# Patient Record
Sex: FEMALE | Race: WHITE | NOT HISPANIC OR LATINO | Employment: PART TIME | URBAN - METROPOLITAN AREA
[De-identification: names, ages, dates, MRNs, and addresses within clinical notes are randomized per-mention and may not be internally consistent; named-entity substitution may affect disease eponyms.]

---

## 2017-01-24 ENCOUNTER — PATIENT MESSAGE (OUTPATIENT)
Dept: DERMATOLOGY | Facility: CLINIC | Age: 57
End: 2017-01-24

## 2017-01-30 DIAGNOSIS — L40.9 PSORIASIS: ICD-10-CM

## 2017-01-30 DIAGNOSIS — Z79.899 ENCOUNTER FOR LONG-TERM (CURRENT) USE OF OTHER MEDICATIONS: ICD-10-CM

## 2017-01-30 RX ORDER — CALCIPOTRIENE AND BETAMETHASONE DIPROPIONATE 50; .5 UG/G; MG/G
SUSPENSION TOPICAL DAILY
Qty: 60 G | Refills: 1 | Status: SHIPPED | OUTPATIENT
Start: 2017-01-30 | End: 2017-02-02 | Stop reason: SDUPTHER

## 2017-02-02 DIAGNOSIS — Z79.899 ENCOUNTER FOR LONG-TERM (CURRENT) USE OF OTHER MEDICATIONS: ICD-10-CM

## 2017-02-02 DIAGNOSIS — L40.9 PSORIASIS: ICD-10-CM

## 2017-02-02 RX ORDER — CALCIPOTRIENE AND BETAMETHASONE DIPROPIONATE 50; .5 UG/G; MG/G
SUSPENSION TOPICAL DAILY
Qty: 60 G | Refills: 1 | Status: SHIPPED | OUTPATIENT
Start: 2017-02-02

## 2017-02-03 ENCOUNTER — PATIENT MESSAGE (OUTPATIENT)
Dept: DERMATOLOGY | Facility: CLINIC | Age: 57
End: 2017-02-03

## 2017-02-13 ENCOUNTER — OFFICE VISIT (OUTPATIENT)
Dept: SPORTS MEDICINE | Facility: CLINIC | Age: 57
End: 2017-02-13
Payer: COMMERCIAL

## 2017-02-13 ENCOUNTER — HOSPITAL ENCOUNTER (OUTPATIENT)
Dept: RADIOLOGY | Facility: HOSPITAL | Age: 57
Discharge: HOME | End: 2017-02-13
Attending: ORTHOPAEDIC SURGERY
Payer: COMMERCIAL

## 2017-02-13 VITALS
HEIGHT: 61 IN | BODY MASS INDEX: 31.15 KG/M2 | SYSTOLIC BLOOD PRESSURE: 148 MMHG | WEIGHT: 165 LBS | DIASTOLIC BLOOD PRESSURE: 101 MMHG | HEART RATE: 85 BPM

## 2017-02-13 DIAGNOSIS — M25.569 KNEE PAIN, UNSPECIFIED CHRONICITY, UNSPECIFIED LATERALITY: ICD-10-CM

## 2017-02-13 DIAGNOSIS — L40.9 PSORIASIS: ICD-10-CM

## 2017-02-13 DIAGNOSIS — M23.91 DERANGEMENT, KNEE INTERNAL, RIGHT: ICD-10-CM

## 2017-02-13 DIAGNOSIS — M25.569 KNEE PAIN, UNSPECIFIED CHRONICITY, UNSPECIFIED LATERALITY: Primary | ICD-10-CM

## 2017-02-13 DIAGNOSIS — M25.561 RIGHT KNEE PAIN, UNSPECIFIED CHRONICITY: ICD-10-CM

## 2017-02-13 DIAGNOSIS — M17.12 LOCALIZED PRIMARY OSTEOARTHRITIS OF LEFT LOWER LEG: ICD-10-CM

## 2017-02-13 DIAGNOSIS — M19.90 OTHER TYPE OF OSTEOARTHRITIS, UNSPECIFIED SITE: ICD-10-CM

## 2017-02-13 DIAGNOSIS — M15.9 PRIMARY OSTEOARTHRITIS INVOLVING MULTIPLE JOINTS: ICD-10-CM

## 2017-02-13 PROCEDURE — 99214 OFFICE O/P EST MOD 30 MIN: CPT | Mod: S$GLB,,, | Performed by: ORTHOPAEDIC SURGERY

## 2017-02-13 PROCEDURE — 3080F DIAST BP >= 90 MM HG: CPT | Mod: S$GLB,,, | Performed by: ORTHOPAEDIC SURGERY

## 2017-02-13 PROCEDURE — 73564 X-RAY EXAM KNEE 4 OR MORE: CPT | Mod: 26,50,, | Performed by: RADIOLOGY

## 2017-02-13 PROCEDURE — 73564 X-RAY EXAM KNEE 4 OR MORE: CPT | Mod: TC,50,PO

## 2017-02-13 PROCEDURE — 3077F SYST BP >= 140 MM HG: CPT | Mod: S$GLB,,, | Performed by: ORTHOPAEDIC SURGERY

## 2017-02-13 PROCEDURE — 99999 PR PBB SHADOW E&M-EST. PATIENT-LVL IV: CPT | Mod: PBBFAC,,, | Performed by: ORTHOPAEDIC SURGERY

## 2017-02-13 RX ORDER — DICLOFENAC SODIUM 75 MG/1
75 TABLET, DELAYED RELEASE ORAL 2 TIMES DAILY PRN
Qty: 60 TABLET | Refills: 6 | Status: SHIPPED | OUTPATIENT
Start: 2017-02-13 | End: 2017-12-06 | Stop reason: CLARIF

## 2017-02-13 NOTE — MR AVS SNAPSHOT
[facility]  [address]  [facility] [address]  Phone: [phone]                  Carleen Kincaid   2017 1:30 PM   Office Visit    Description:  Female : 1960   Provider:  Sri Roberts MD   Department:  [facility]           Reason for Visit     Right Knee - Follow-up           Diagnoses this Visit        Comments    Knee pain, unspecified chronicity, unspecified laterality    -  Primary     Other type of osteoarthritis, unspecified site         Right knee pain, unspecified chronicity         Localized primary osteoarthritis of left lower leg         Primary osteoarthritis involving multiple joints         Psoriasis         Derangement, knee internal, right                To Do List           Future Appointments        Provider Department Dept Phone    2017 3:45 PM [facility] [place] [facility] [phone]    3/15/2017 2:20 PM Ania Escobedo MD [place] [facility] D[facility]atology [phone]    5/15/2017 12:45 PM Sri Roberts MD [facility] [phone]      Goals (5 Years of Data)     None      Follow-Up and Disposition     Return in about 3 months (around 2017), or RTC in 3 months with Dr. Sri Roberts Patient will fill out IKDC, SF-12 and KOOS and bilateral knee s, for RTC in 3 months with Dr. Sri Roberts Patient will fill out IKDC, SF-12 and KOOS and bilateral knee s.    Follow-up and Disposition History       These Medications        Disp Refills Start End    diclofenac (VOLTAREN) 75 MG EC tablet 60 tablet 6 2017     Take 1 tablet (75 mg total) by mouth 2 (two) times daily as needed. - Oral    Pharmacy: [id number] - [place], [place] - [address] AT [facility] [address] Ph #: [phone]         [facility] On Call     [facility] On Call Nurse Care Line -  Assistance  Registered nurses in the [facility] provide clinical advisement, health education,  appointment booking, and other advisory services.  Call for this free service at [phone].             Medications           Message regarding Medications     Verify the changes and/or additions to your medication regime listed below are the same as discussed with your clinician today.  If any of these changes or additions are incorrect, please notify your healthcare provider.             Verify that the below list of medications is an accurate representation of the medications you are currently taking.  If none reported, the list may be blank. If incorrect, please contact your healthcare provider. Carry this list with you in case of emergency.           Current Medications     adalimumab (HUMIRA PEN) PnKt injection Inject 0.8 mLs (40 mg total) into the skin every 14 (fourteen) days.    calcipotriene-betamethasone (TACLONEX SCALP) external suspension Apply topically once daily.    cyclobenzaprine (FLEXERIL) 10 MG tablet Take 1 tablet (10 mg total) by mouth once daily.    diclofenac (VOLTAREN) 75 MG EC tablet Take 1 tablet (75 mg total) by mouth 2 (two) times daily as needed.    diclofenac sodium 1 % Gel Apply 2 g topically 4 (four) times daily.    diphenhydramine-acetaminophen (TYLENOL PM)  mg Tab Take 1 tablet by mouth once daily.    ergocalciferol (ERGOCALCIFEROL) 50,000 unit Cap     lisinopril (PRINIVIL,ZESTRIL) 5 MG tablet Take 5 mg by mouth once daily.    mupirocin (BACTROBAN) 2 % ointment     tramadol (ULTRAM) 50 mg tablet Take 1 tablet (50 mg total) by mouth every 6 (six) hours as needed for Pain.    adalimumab (HUMIRA) 40 mg/0.8 mL injection Inject 0.8 mLs (40 mg total) into the skin every 14 (fourteen) days.    aspirin (ECOTRIN) 325 MG EC tablet Take 1 tablet (325 mg total) by mouth once daily.    evolocumab 140 mg/mL PnIj Inject 140 mg into the skin every 14 (fourteen) days.    hydrocodone-acetaminophen 10-325mg (NORCO)  mg Tab Take 1 tablet by mouth daily as needed.           Clinical  "Reference Information           Your Vitals Were     BP Pulse Height Weight BMI    148/101 85 5' 1" (1.549 m) 74.8 kg (165 lb) 31.18 kg/m2      Blood Pressure          Most Recent Value    BP  (!)  148/101      Allergies as of 2/13/2017     Demerol [Meperidine]      Immunizations Administered on Date of Encounter - 2/13/2017     None      Orders Placed During Today's Visit     Future Labs/Procedures Expected by Expires    C-reactive protein  2/13/2017 4/14/2018    CBC auto differential  2/13/2017 4/14/2018    Sedimentation rate, manual  2/13/2017 4/14/2018    X-ray Knee Ortho Bilateral with Flexion  2/13/2017 2/13/2018      Language Assistance Services     ATTENTION: Language assistance services are available, free of charge. Please call [phone].      ATENCIÓN: Si catela alva, tiene a martinez disposición servicios gratuitos de asistencia lingüística. Llame al [phone].     CHÚ Ý: N?u b?n nói Ti?ng Vi?t, có các d?ch v? h? tr? ngôn ng? mi?n phí dành cho b?n. G?i s? [phone].         [facility] complies with applicable Federal civil rights laws and does not discriminate on the basis of race, color, national origin, age, disability, or sex.        "

## 2017-02-13 NOTE — PROGRESS NOTES
Subjective:          Chief Complaint: Carleen Kincaid is a 56 y.o. female who had concerns including Follow-up of the Right Knee.    HPI Comments: Patient is here for a follow up for both of her knees. She finished her PT. She has not been as compliant with an HEP as she should. Her left knee is doing very well.           DATE OF PROCEDURE: 8/25/2016        POSTOPERATIVE DIAGNOSIS:       1. Left knee  Arthritis, Traumatic M12.50 and Genu Varum (acquired) M21.169  2. Right Knee Internal derangement knee M23.90, Synovitis M65.9 and Tear, Lateral meniscus, old M23.202        PROCEDURES PERFORMED:    1. Left Replacement, Knee, Medial and Lateral compartment (Total Knee) 18169      2. Right knee     Arthroscopy, with lysis of adhesions 76109     3. Right  Arthroscopy, knee, synovectomy, major 45816     4. Right  Arthroscopy, with meniscectomy (medial OR lateral) 28143               Review of Systems   Constitution: Negative. Negative for chills, fever, weight gain and weight loss.   HENT: Negative for congestion, headaches and sore throat.    Eyes: Negative for blurred vision and double vision.   Cardiovascular: Negative for chest pain, leg swelling and palpitations.   Respiratory: Negative for cough and shortness of breath.    Hematologic/Lymphatic: Does not bruise/bleed easily.   Skin: Negative for itching, poor wound healing and rash.   Musculoskeletal: Positive for joint pain, joint swelling and stiffness. Negative for back pain, muscle weakness and myalgias.   Gastrointestinal: Negative for abdominal pain, constipation, diarrhea, nausea and vomiting.   Genitourinary: Negative.  Negative for frequency and hematuria.   Neurological: Negative for dizziness, numbness, paresthesias and sensory change.   Psychiatric/Behavioral: Negative for altered mental status and depression. The patient is not nervous/anxious.    Allergic/Immunologic: Negative for hives.       Pain Related Questions  Over the past 3 days, what was  your average pain during activity? (I.e. running, jogging, walking, climbing stairs, getting dressed, ect.): 0  Over the past 3 days, what was your highest pain level?: 0  Over the past 3 days, what was your lowest pain level? : 0    Other  How many nights a week are you awakened by your affected body part?: 0  Was the patient's HEIGHT measured or patient reported?: Patient Reported  Was the patient's WEIGHT measured or patient reported?: Measured      Objective:        General: Carleen is well-developed, well-nourished, appears stated age, in no acute distress, alert and oriented to time, place and person.     General    Vitals reviewed.  Constitutional: She is oriented to person, place, and time. She appears well-developed and well-nourished. No distress.   HENT:   Mouth/Throat: No oropharyngeal exudate.   Eyes: Right eye exhibits no discharge. Left eye exhibits no discharge.   Neck: Normal range of motion.   Cardiovascular: Normal rate and regular rhythm.    Pulmonary/Chest: Effort normal and breath sounds normal. No respiratory distress.   Neurological: She is alert and oriented to person, place, and time. She has normal reflexes. No cranial nerve deficit. Coordination normal.   Psychiatric: She has a normal mood and affect. Her behavior is normal. Judgment and thought content normal.     General Musculoskeletal Exam   Gait: varus thrust       Right Knee Exam     Inspection   Erythema: absent  Scars: present  Swelling: present  Effusion: present  Deformity: deformity  Bruising: absent    Tenderness   The patient is tender to palpation of the medial joint line and lateral joint line.    Range of Motion   Extension: 0   Flexion: 130     Tests   Meniscus   Sonal:  Medial - negative Lateral - negative  Ligament Examination Lachman: normal (-1 to 2mm) PCL-Posterior Drawer: normal (0 to 2mm)     MCL - Valgus: abnormal - grade II  LCL - Varus: abnormal - grade IIPivot Shift: normal (Equal)Reverse Pivot Shift: normal  (Equal)Dial Test at 30 degrees: normal (< 5 degrees)Dial Test at 90 degrees: normal (< 5 degrees)  Posterior Sag Test: negative  Posterolateral Corner: unstable (>15 degrees difference)  Patella   Patellar Apprehension: negative  Passive Patellar Tilt: neutral  Patellar Tracking: normal  Patellar Glide (quadrants): Lateral - 1   Medial - 2  Q-Angle at 90 degrees: normal  Patellar Grind: negative  J-Sign: none    Other   Meniscal Cyst: absent  Popliteal (Baker's) Cyst: absent  Sensation: normal    Comments:  Mid-range varus / valgus movement in increased relative to previous examination    Left Knee Exam     Inspection   Erythema: absent  Scars: present  Swelling: absent  Effusion: absent  Deformity: deformity  Bruising: absent    Tenderness   The patient is experiencing no tenderness.         Range of Motion   Extension: 0   Flexion: 130     Tests   Meniscus   Sonal:  Medial - negative Lateral - negative  Stability Lachman: normal (-1 to 2mm) PCL-Posterior Drawer: normal (0 to 2mm)  MCL - Valgus: normal (0 to 2mm)  LCL - Varus: normal (0 to 2mm)Pivot Shift: normal (Equal)Reverse Pivot Shift: normal (Equal)Dial Test at 30 degrees: normal (< 5 degrees)Dial Test at 90 degrees: normal (< 5 degrees)  Posterior Sag Test: negative  Posterolateral Corner: unstable (>15 degrees difference)  Patella   Patellar Apprehension: negative  Passive Patellar Tilt: neutral  Patellar Tracking: normal  Patellar Glide (Quadrants): Lateral - 1 Medial - 2  Q-Angle at 90 degrees: normal  Patellar Grind: negative  J-Sign: J sign absent    Other   Meniscal Cyst: absent  Popliteal (Baker's) Cyst: absent  Sensation: normal    Right Hip Exam     Tests   Julio Cesar: negative  Left Hip Exam     Tests   Julio Cesar: negative          Muscle Strength   Right Lower Extremity   Hip Abduction: 5/5   Quadriceps:  4/5   Hamstrin/5   Left Lower Extremity   Hip Abduction: 5/5   Quadriceps:  4/5   Hamstrin/5     Reflexes     Left Side  Quadriceps:   "2+  Achilles:  2+    Right Side   Quadriceps:  2+  Achilles:  2+    Vascular Exam     Right Pulses  Dorsalis Pedis:      2+  Posterior Tibial:      2+        Left Pulses  Dorsalis Pedis:      2+  Posterior Tibial:      2+        Edema  Right Lower Leg: absent  Left Lower Leg: absent      RADIOGRAPHS TODAY:  Bilateral ConforMIS TKR with loosening of the tibial plate and component at the lateral greater than medial aspect of the proximal tibia; noticeable but subtle "windshield" wiper appearance of the tibial stem portion of the implant. Increased varus position right vs. Left side            Assessment:       Encounter Diagnoses   Name Primary?    Knee pain, unspecified chronicity, unspecified laterality Yes    Other type of osteoarthritis, unspecified site     Right knee pain, unspecified chronicity     Localized primary osteoarthritis of left lower leg     Primary osteoarthritis involving multiple joints     Psoriasis     Derangement, knee internal, right           Plan:       1. IKDC, SF-12 and KOOS was filled out today in clinic.     RTC in 3 months with Dr. Sri Roberts Patient will fill out IKDC, SF-12 and KOOS and bilateral knee series on return.    2. Continue Diclofenac - refilled today    3. Continue HEP and progress as tolerated    4. Did not want to undergo aspiration today without signs of infection right TKR; will send ESR, CRP and CBC with diff; she did have a negative aspiration of the knee prior to recent right knee arthroscopic lysis of adhesions.    5. [address] - Adelso Ballard, performed a custom orthotic / brace adjustment, fitting and training with the patient. The patient demonstrated understanding and proper care. This was performed for 15 minutes. Viscoskin applied today and medically necessary                    Patient questionnaires may have been collected.  "

## 2017-02-20 ENCOUNTER — PATIENT MESSAGE (OUTPATIENT)
Dept: SPORTS MEDICINE | Facility: CLINIC | Age: 57
End: 2017-02-20

## 2017-02-23 ENCOUNTER — TELEPHONE (OUTPATIENT)
Dept: SPORTS MEDICINE | Facility: CLINIC | Age: 57
End: 2017-02-23

## 2017-02-23 NOTE — TELEPHONE ENCOUNTER
----- Message from Sri Roberts MD sent at 2/17/2017  6:57 AM CST -----  Low ESR and CRP levels noted  No signs of infection  Possible revision TKR at later time

## 2017-02-24 ENCOUNTER — PATIENT MESSAGE (OUTPATIENT)
Dept: DERMATOLOGY | Facility: CLINIC | Age: 57
End: 2017-02-24

## 2017-03-15 ENCOUNTER — OFFICE VISIT (OUTPATIENT)
Dept: DERMATOLOGY | Facility: CLINIC | Age: 57
End: 2017-03-15
Payer: COMMERCIAL

## 2017-03-15 DIAGNOSIS — D18.00 ANGIOMA: Primary | ICD-10-CM

## 2017-03-15 DIAGNOSIS — L73.8 SEBACEOUS GLAND HYPERPLASIA: ICD-10-CM

## 2017-03-15 DIAGNOSIS — L40.0 PSORIASIS VULGARIS: ICD-10-CM

## 2017-03-15 DIAGNOSIS — L02.92 FURUNCLE: ICD-10-CM

## 2017-03-15 DIAGNOSIS — Z80.8 FAMILY HX OF MELANOMA: ICD-10-CM

## 2017-03-15 DIAGNOSIS — Z79.620 ADALIMUMAB (HUMIRA) LONG-TERM USE: ICD-10-CM

## 2017-03-15 DIAGNOSIS — L82.1 SK (SEBORRHEIC KERATOSIS): ICD-10-CM

## 2017-03-15 PROCEDURE — 99999 PR PBB SHADOW E&M-EST. PATIENT-LVL II: CPT | Mod: PBBFAC,,, | Performed by: DERMATOLOGY

## 2017-03-15 PROCEDURE — 1160F RVW MEDS BY RX/DR IN RCRD: CPT | Mod: S$GLB,,, | Performed by: DERMATOLOGY

## 2017-03-15 PROCEDURE — 99214 OFFICE O/P EST MOD 30 MIN: CPT | Mod: S$GLB,,, | Performed by: DERMATOLOGY

## 2017-03-15 RX ORDER — DICLOFENAC SODIUM 16.05 MG/ML
SOLUTION TOPICAL
COMMUNITY
Start: 2016-12-26 | End: 2018-06-18 | Stop reason: ALTCHOICE

## 2017-03-15 RX ORDER — MUPIROCIN 20 MG/G
OINTMENT TOPICAL
Qty: 30 G | Refills: 2 | Status: SHIPPED | OUTPATIENT
Start: 2017-03-15 | End: 2018-09-21 | Stop reason: SDUPTHER

## 2017-03-15 NOTE — PROGRESS NOTES
Subjective:       Patient ID:  Carleen Kincaid is a 56 y.o. female who presents for   Chief Complaint   Patient presents with    Psoriasis    Skin Check     HPI Comments: Pt presents for annual tbse.  Three brothers with melanoma.  Here for annual check of psoriasis.  tx with Humira.  currently flared.  Stopped humira in August 2016 for knee surgery x 2 months .  Re-started in September but only every 6 weeks since she was clear.  Flared at that time on Legs. Stomach, and scalp.  Restarted humira q 2 weeks feb 1 2017 and this has helped.  Also uses taclonex topically.    C/o boil on inside of leg.  Inflamed and tender. Using bactroban ointment.  Hot baths qhs.  Still present.     Psoriasis         Review of Systems   Constitutional: Negative for fever, chills, weight loss, weight gain, fatigue, night sweats and malaise.   Eyes: Negative for visual change.   Respiratory: Negative for cough and shortness of breath.    Musculoskeletal: Negative for joint swelling and arthralgias.   Skin: Positive for rash and abscesses. Negative for itching.   Neurological: Negative for focal weakness, seizures and numbness.        Objective:    Physical Exam   Constitutional: She appears well-developed and well-nourished. No distress.   Neurological: She is alert and oriented to person, place, and time. She is not disoriented.   Psychiatric: She has a normal mood and affect.   Skin:   Areas Examined (abnormalities noted in diagram):   Scalp / Hair Palpated and Inspected  Head / Face Inspection Performed  Neck Inspection Performed  Chest / Axilla Inspection Performed  Abdomen Inspection Performed  Genitals / Buttocks / Groin Inspection Performed  Back Inspection Performed  RUE Inspected  LUE Inspection Performed  RLE Inspected  LLE Inspection Performed  Nails and Digits Inspection Performed                       Diagram Legend     Erythematous scaling macule/papule c/w actinic keratosis       Vascular papule c/w angioma       Pigmented verrucoid papule/plaque c/w seborrheic keratosis      Yellow umbilicated papule c/w sebaceous hyperplasia      Irregularly shaped tan macule c/w lentigo     1-2 mm smooth white papules consistent with Milia      Movable subcutaneous cyst with punctum c/w epidermal inclusion cyst      Subcutaneous movable cyst c/w pilar cyst      Firm pink to brown papule c/w dermatofibroma      Pedunculated fleshy papule(s) c/w skin tag(s)      Evenly pigmented macule c/w junctional nevus     Mildly variegated pigmented, slightly irregular-bordered macule c/w mildly atypical nevus      Flesh colored to evenly pigmented papule c/w intradermal nevus       Pink pearly papule/plaque c/w basal cell carcinoma      Erythematous hyperkeratotic cursted plaque c/w SCC      Surgical scar with no sign of skin cancer recurrence      Open and closed comedones      Inflammatory papules and pustules      Verrucoid papule consistent consistent with wart     Erythematous eczematous patches and plaques     Dystrophic onycholytic nail with subungual debris c/w onychomycosis     Umbilicated papule    Erythematous-base heme-crusted tan verrucoid plaque consistent with inflamed seborrheic keratosis     Erythematous Silvery Scaling Plaque c/w Psoriasis     See annotation      Assessment / Plan:        Angioma  These are benign vascular lesions that are inherited.  Treatment is not necessary.    SK (seborrheic keratosis)  These are benign inherited growths without a malignant potential. Reassurance given to patient. No treatment is necessary.     Sebaceous gland hyperplasia  Reassurance given to patient. No treatment is necessary.   Treatment of benign, asymptomatic lesions may be considered cosmetic.    Family hx of melanoma.  Total body skin examination performed today including at least 12 points as noted in physical examination. No lesions suspicious for malignancy noted.    Psoriasis vulgaris  Adalimumab (Humira) long-term use  Continue  Humira and if continues to improve using every 2 weeks will stay on Humira, otherwise could consider taltz  -     calcipotriene-betamethasone (ENSTILAR) 0.005-0.064 % Foam; Apply 1 application topically once daily.  Dispense: 60 g; Refill: 5  -     Comprehensive metabolic panel; Future  -     Quantiferon Gold TB; Future  Cbc 2/2017 wnl   clodan shampoo..    -     Comprehensive metabolic panel; Future  -     Quantiferon Gold TB; Future    Furuncle  Warm compresses    -     mupirocin (BACTROBAN) 2 % ointment; AAA bid  Dispense: 30 g; Refill: 2             Return in about 3 months (around 6/15/2017).

## 2017-03-27 ENCOUNTER — PATIENT MESSAGE (OUTPATIENT)
Dept: DERMATOLOGY | Facility: CLINIC | Age: 57
End: 2017-03-27

## 2017-03-27 DIAGNOSIS — L40.0 PSORIASIS VULGARIS: Primary | ICD-10-CM

## 2017-03-27 RX ORDER — CLOBETASOL PROPIONATE 0.5 MG/ML
SHAMPOO TOPICAL
Qty: 1 BOTTLE | Refills: 3 | Status: SHIPPED | OUTPATIENT
Start: 2017-03-27 | End: 2022-03-30 | Stop reason: SDUPTHER

## 2017-03-28 ENCOUNTER — TELEPHONE (OUTPATIENT)
Dept: DERMATOLOGY | Facility: CLINIC | Age: 57
End: 2017-03-28

## 2017-03-28 NOTE — TELEPHONE ENCOUNTER
----- Message from Ania Escobedo MD sent at 3/27/2017  1:48 PM CDT -----  Contact: Pharmacy   yes  ----- Message -----     From: Jesusita Sandhu LPN     Sent: 3/27/2017  12:57 PM       To: Ania Escobedo MD    Okay to change to generic?  ----- Message -----     From: Herbert Mcmanus     Sent: 3/27/2017  12:55 PM       To: Fanny ATKINSON Staff    Requesting generic medication for CLODAN 0.05 % shampoo, for lower cost for patient.    Call: [phone]

## 2017-05-15 ENCOUNTER — HOSPITAL ENCOUNTER (OUTPATIENT)
Dept: RADIOLOGY | Facility: HOSPITAL | Age: 57
Discharge: HOME | End: 2017-05-15
Attending: ORTHOPAEDIC SURGERY
Payer: COMMERCIAL

## 2017-05-15 ENCOUNTER — OFFICE VISIT (OUTPATIENT)
Dept: SPORTS MEDICINE | Facility: CLINIC | Age: 57
End: 2017-05-15
Payer: COMMERCIAL

## 2017-05-15 VITALS
WEIGHT: 165 LBS | BODY MASS INDEX: 31.15 KG/M2 | SYSTOLIC BLOOD PRESSURE: 140 MMHG | DIASTOLIC BLOOD PRESSURE: 98 MMHG | HEIGHT: 61 IN | HEART RATE: 95 BPM

## 2017-05-15 DIAGNOSIS — M17.12 LOCALIZED PRIMARY OSTEOARTHRITIS OF LEFT LOWER LEG: ICD-10-CM

## 2017-05-15 DIAGNOSIS — M21.161 GENU VARUM OF BOTH LOWER EXTREMITIES: ICD-10-CM

## 2017-05-15 DIAGNOSIS — M25.569 KNEE PAIN, UNSPECIFIED CHRONICITY, UNSPECIFIED LATERALITY: ICD-10-CM

## 2017-05-15 DIAGNOSIS — M25.569 KNEE PAIN, UNSPECIFIED CHRONICITY, UNSPECIFIED LATERALITY: Primary | ICD-10-CM

## 2017-05-15 DIAGNOSIS — M21.162 GENU VARUM OF BOTH LOWER EXTREMITIES: ICD-10-CM

## 2017-05-15 DIAGNOSIS — M23.91 INTERNAL DERANGEMENT OF RIGHT KNEE: ICD-10-CM

## 2017-05-15 PROCEDURE — 3080F DIAST BP >= 90 MM HG: CPT | Mod: S$GLB,,, | Performed by: ORTHOPAEDIC SURGERY

## 2017-05-15 PROCEDURE — 3077F SYST BP >= 140 MM HG: CPT | Mod: S$GLB,,, | Performed by: ORTHOPAEDIC SURGERY

## 2017-05-15 PROCEDURE — 87102 FUNGUS ISOLATION CULTURE: CPT

## 2017-05-15 PROCEDURE — 73564 X-RAY EXAM KNEE 4 OR MORE: CPT | Mod: 26,50,, | Performed by: RADIOLOGY

## 2017-05-15 PROCEDURE — 87075 CULTR BACTERIA EXCEPT BLOOD: CPT

## 2017-05-15 PROCEDURE — 87070 CULTURE OTHR SPECIMN AEROBIC: CPT

## 2017-05-15 PROCEDURE — 87116 MYCOBACTERIA CULTURE: CPT

## 2017-05-15 PROCEDURE — 1160F RVW MEDS BY RX/DR IN RCRD: CPT | Mod: S$GLB,,, | Performed by: ORTHOPAEDIC SURGERY

## 2017-05-15 PROCEDURE — 73564 X-RAY EXAM KNEE 4 OR MORE: CPT | Mod: TC,50,PO

## 2017-05-15 PROCEDURE — 87205 SMEAR GRAM STAIN: CPT

## 2017-05-15 PROCEDURE — 99214 OFFICE O/P EST MOD 30 MIN: CPT | Mod: S$GLB,,, | Performed by: ORTHOPAEDIC SURGERY

## 2017-05-15 PROCEDURE — 99999 PR PBB SHADOW E&M-EST. PATIENT-LVL IV: CPT | Mod: PBBFAC,,, | Performed by: ORTHOPAEDIC SURGERY

## 2017-05-15 NOTE — MR AVS SNAPSHOT
[facility]  [address]  [facility] [address]  Phone: [phone]                  Carleen Kincaid   5/15/2017 12:45 PM   Office Visit    Description:  Female : 1960   Provider:  Sri Roberts MD   Department:  [facility]           Reason for Visit     Right Knee - Follow-up           Diagnoses this Visit        Comments    Knee pain, unspecified chronicity, unspecified laterality    -  Primary     Localized primary osteoarthritis of left lower leg         Internal derangement of right knee         Genu varum of both lower extremities                To Do List           Future Appointments        Provider Department Dept Phone    2017 2:00 PM Ania Escobedo MD [place] - Dermatology [phone]      Goals (5 Years of Data)     None      Follow-Up and Disposition     Return in about 3 months (around 8/15/2017), or RTC in 3 months with Dr. Sri Roberts for preoperative history and physical. Patient will fill out IK, for RTC in 3 months with Dr. Sri Roberts for preoperative history and physical. Patient will fill out IK.      [facility]     [facility] Care [facility] -  Assistance  Unless otherwise directed by your provider, please contact [facility], our nurse care line that is available for  assistance.     Registered nurses in the [facility] provide: appointment scheduling, clinical advisement, health education, and other advisory services.  Call: [phone] (toll free)               Medications           Message regarding Medications     Verify the changes and/or additions to your medication regime listed below are the same as discussed with your clinician today.  If any of these changes or additions are incorrect, please notify your healthcare provider.             Verify that the below list of medications is an accurate representation of the medications you are currently taking.  If none reported, the list  "may be blank. If incorrect, please contact your healthcare provider. Carry this list with you in case of emergency.           Current Medications     adalimumab (HUMIRA) 40 mg/0.8 mL injection Inject 0.8 mLs (40 mg total) into the skin every 14 (fourteen) days.    calcipotriene-betamethasone (ENSTILAR) 0.005-0.064 % Foam Apply 1 application topically once daily.    calcipotriene-betamethasone (TACLONEX SCALP) external suspension Apply topically once daily.    CLODAN 0.05 % shampoo Apply to dry scalp 15 min prior to washing/rinsing qod    diclofenac (VOLTAREN) 75 MG EC tablet Take 1 tablet (75 mg total) by mouth 2 (two) times daily as needed.    diclofenac sodium 1 % Gel Apply 2 g topically 4 (four) times daily.    diphenhydramine-acetaminophen (TYLENOL PM)  mg Tab Take 1 tablet by mouth once daily.    ergocalciferol (ERGOCALCIFEROL) 50,000 unit Cap     lisinopril (PRINIVIL,ZESTRIL) 5 MG tablet Take 5 mg by mouth once daily.    mupirocin (BACTROBAN) 2 % ointment     diclofenac sodium 1.5 % Drop     evolocumab 140 mg/mL PnIj Inject 140 mg into the skin every 14 (fourteen) days.    mupirocin (BACTROBAN) 2 % ointment AAA bid           Clinical Reference Information           Your Vitals Were     BP Pulse Height Weight BMI    140/98 95 5' 1" (1.549 m) 74.8 kg (165 lb) 31.18 kg/m2      Blood Pressure          Most Recent Value    BP  (!)  140/98      Allergies as of 5/15/2017     Demerol [Meperidine]      Immunizations Administered on Date of Encounter - 5/15/2017     None      Orders Placed During Today's Visit      Normal Orders This Visit    Ambulatory Referral to Physical/Occupational Therapy     Future Labs/Procedures Expected by Expires    Aerobic Culture  5/15/2017 7/14/2018    AFB Culture & Smear  5/15/2017 7/14/2018    Culture, Anaerobe  5/15/2017 7/14/2018    Fungus Culture  5/15/2017 7/14/2018    Gram Stain  5/15/2017 7/14/2018    X-ray Knee Ortho Bilateral with Flexion  5/15/2017 5/15/2018      Language " Assistance Services     ATTENTION: Language assistance services are available, free of charge. Please call [phone].      ATENCIÓN: Si habla alva, tiene a martinez disposición servicios gratuitos de asistencia lingüística. Llame al [phone].     CHÚ Ý: N?u b?n nói Ti?ng Vi?t, có các d?ch v? h? tr? ngôn ng? mi?n phí dành cho b?n. G?i s? [phone].         [facility] complies with applicable Federal civil rights laws and does not discriminate on the basis of race, color, national origin, age, disability, or sex.

## 2017-05-15 NOTE — PROGRESS NOTES
Subjective:          Chief Complaint: Carleen Kincaid is a 56 y.o. female who had concerns including Follow-up of the Right Knee.    HPI Comments: Patient is here for a follow up for both of her knees. Her left knee is doing well. Her right knee is still giving her problems. She still has a pain in the back of her knee. It does seem to be gradually improving but it is still not where she wants it to be. She does not want to consider surgery at this time.          DATE OF PROCEDURE: 8/25/2016        POSTOPERATIVE DIAGNOSIS:       1. Left knee  Arthritis, Traumatic M12.50 and Genu Varum (acquired) M21.169  2. Right Knee Internal derangement knee M23.90, Synovitis M65.9 and Tear, Lateral meniscus, old M23.202        PROCEDURES PERFORMED:    1. Left Replacement, Knee, Medial and Lateral compartment (Total Knee) 22741      2. Right knee     Arthroscopy, with lysis of adhesions 27072     3. Right  Arthroscopy, knee, synovectomy, major 92319     4. Right  Arthroscopy, with meniscectomy (medial OR lateral) 59105               Review of Systems   Constitution: Negative. Negative for chills, fever, weight gain and weight loss.   HENT: Negative for congestion, headaches and sore throat.    Eyes: Negative for blurred vision and double vision.   Cardiovascular: Negative for chest pain, leg swelling and palpitations.   Respiratory: Negative for cough and shortness of breath.    Hematologic/Lymphatic: Does not bruise/bleed easily.   Skin: Negative for itching, poor wound healing and rash.   Musculoskeletal: Positive for joint pain, joint swelling and stiffness. Negative for back pain, muscle weakness and myalgias.   Gastrointestinal: Negative for abdominal pain, constipation, diarrhea, nausea and vomiting.   Genitourinary: Negative.  Negative for frequency and hematuria.   Neurological: Negative for dizziness, numbness, paresthesias and sensory change.   Psychiatric/Behavioral: Negative for altered mental status and depression.  The patient is not nervous/anxious.    Allergic/Immunologic: Negative for hives.       Pain Related Questions  Over the past 3 days, what was your average pain during activity? (I.e. running, jogging, walking, climbing stairs, getting dressed, ect.): 2  Over the past 3 days, what was your highest pain level?: 2  Over the past 3 days, what was your lowest pain level? : 1    Other  How many nights a week are you awakened by your affected body part?: 5  Was the patient's HEIGHT measured or patient reported?: Patient Reported  Was the patient's WEIGHT measured or patient reported?: Measured      Objective:        General: Carleen is well-developed, well-nourished, appears stated age, in no acute distress, alert and oriented to time, place and person.     General    Vitals reviewed.  Constitutional: She is oriented to person, place, and time. She appears well-developed and well-nourished. No distress.   HENT:   Mouth/Throat: No oropharyngeal exudate.   Eyes: Right eye exhibits no discharge. Left eye exhibits no discharge.   Neck: Normal range of motion.   Cardiovascular: Normal rate and regular rhythm.    Pulmonary/Chest: Effort normal and breath sounds normal. No respiratory distress.   Neurological: She is alert and oriented to person, place, and time. She has normal reflexes. No cranial nerve deficit. Coordination normal.   Psychiatric: She has a normal mood and affect. Her behavior is normal. Judgment and thought content normal.     General Musculoskeletal Exam   Gait: varus thrust       Right Knee Exam     Inspection   Erythema: absent  Scars: present  Swelling: present  Effusion: present  Deformity: deformity  Bruising: absent    Tenderness   The patient is tender to palpation of the medial joint line and lateral joint line.    Range of Motion   Extension: 0   Flexion: 130     Tests   Meniscus   Sonal:  Medial - negative Lateral - negative  Ligament Examination Lachman: normal (-1 to 2mm) PCL-Posterior Drawer:  normal (0 to 2mm)     MCL - Valgus: abnormal - grade II  LCL - Varus: abnormal - grade IIPivot Shift: normal (Equal)Reverse Pivot Shift: normal (Equal)Dial Test at 30 degrees: normal (< 5 degrees)Dial Test at 90 degrees: normal (< 5 degrees)  Posterior Sag Test: negative  Posterolateral Corner: unstable (>15 degrees difference)  Patella   Patellar Apprehension: negative  Passive Patellar Tilt: neutral  Patellar Tracking: normal  Patellar Glide (quadrants): Lateral - 1   Medial - 2  Q-Angle at 90 degrees: normal  Patellar Grind: negative  J-Sign: none    Other   Meniscal Cyst: absent  Popliteal (Baker's) Cyst: absent  Sensation: normal    Comments:  Mid-range varus / valgus movement in increased relative to previous examination    Left Knee Exam     Inspection   Erythema: absent  Scars: present  Swelling: absent  Effusion: absent  Deformity: deformity  Bruising: absent    Tenderness   The patient is experiencing no tenderness.         Range of Motion   Extension: 0   Flexion: 130     Tests   Meniscus   Sonal:  Medial - negative Lateral - negative  Stability Lachman: normal (-1 to 2mm) PCL-Posterior Drawer: normal (0 to 2mm)  MCL - Valgus: normal (0 to 2mm)  LCL - Varus: normal (0 to 2mm)Pivot Shift: normal (Equal)Reverse Pivot Shift: normal (Equal)Dial Test at 30 degrees: normal (< 5 degrees)Dial Test at 90 degrees: normal (< 5 degrees)  Posterior Sag Test: negative  Posterolateral Corner: unstable (>15 degrees difference)  Patella   Patellar Apprehension: negative  Passive Patellar Tilt: neutral  Patellar Tracking: normal  Patellar Glide (Quadrants): Lateral - 1 Medial - 2  Q-Angle at 90 degrees: normal  Patellar Grind: negative  J-Sign: J sign absent    Other   Meniscal Cyst: absent  Popliteal (Baker's) Cyst: absent  Sensation: normal    Right Hip Exam     Tests   Julio Cesar: negative  Left Hip Exam     Tests   Julio Cesar: negative          Muscle Strength   Right Lower Extremity   Hip Abduction: 5/5   Quadriceps:  4/5    Hamstrin/5   Left Lower Extremity   Hip Abduction: 5/5   Quadriceps:  4/5   Hamstrin/5     Reflexes     Left Side  Quadriceps:  2+  Achilles:  2+    Right Side   Quadriceps:  2+  Achilles:  2+    Vascular Exam     Right Pulses  Dorsalis Pedis:      2+  Posterior Tibial:      2+        Left Pulses  Dorsalis Pedis:      2+  Posterior Tibial:      2+        Edema  Right Lower Leg: absent  Left Lower Leg: absent      RADIOGRAPHS TODAY:    Results:  No evidence of acute fracture or dislocation.  Bony mineralization is normal.  Soft tissues are unremarkable. Lateral view of the bilateral knee demonstrates moderate bilateral joint effusions, right greater than left.     Bilaterally replacement satisfactorily alignment.          Assessment:       Encounter Diagnoses   Name Primary?    Knee pain, unspecified chronicity, unspecified laterality Yes    Localized primary osteoarthritis of left lower leg     Internal derangement of right knee     Genu varum of both lower extremities           Plan:       1. IKDC, SF-12 and KOOS was filled out today in clinic.     RTC in 3 months with Dr. rSi Roberts for preoperative history and physical. Patient will fill out IKDC, SF-12 and KOOS and bilateral knee series on return.    2. Continue with activities as tolerated with  brace    3. Aspiration Procedure    After time out was performed, including verification of patient ID, procedure, site and side, availability of information and equipment, review of safety issues, and agreement with consent, the procedure site was marked and the patient was prepped aseptically. 15cc's of serosanguineous joint fluid was aspirated from the right Knee Joint using an 21.5g x 1.5 needle in sterile fashion without complication. Bandage was applied. Patient was reminded to rest with RICE for 48 hours post injection and to call the clinic immediately for any adverse side effects as explained in clinic today.    4. Fluid sent for  Synovasure and culture assessment    5. We reviewed with Carleen today, the pathology and natural history of her diagnosis. We have discussed a variety of treatment options including medications, physical therapy and other alternative treatments. I also explained the indications, risks and benefits of surgery. After discussion, Carleen decided to proceed with surgery. The decision was made to go forward with :  1. Right knee revision tibial component (ConforMIS implant iTotal -  With need to have increased variable inserts made preoperatively    The details of the surgical procedure were explained, including the location of probable incisions and a description of likely hardware and/or grafts to be used.  The patient understands the likely convalescence after surgery.  Also, we have thoroughly discussed the risks, benefits and alternatives to surgery, including, but not limited to, the risk of infection, joint stiffness, blood clot (including DVT and/or pulmonary embolus), neurologic and vascular injury.  It was explained that, if tissue has been repaired or reconstructed, there is a chance of failure, which may require further management.      All of the patient's questions were answered and informed consent was obtained. The patient will contact us if they have any questions or concerns in the interim.    6. PT at [facility] in [place], [place]                      Patient questionnaires may have been collected.

## 2017-05-16 LAB
GRAM STN SPEC: NORMAL
GRAM STN SPEC: NORMAL

## 2017-05-18 LAB — BACTERIA SPEC AEROBE CULT: NO GROWTH

## 2017-05-22 LAB — BACTERIA SPEC ANAEROBE CULT: NORMAL

## 2017-06-21 LAB — FUNGUS SPEC CULT: NORMAL

## 2017-07-11 DIAGNOSIS — T85.848A PAIN FROM IMPLANTED HARDWARE, INITIAL ENCOUNTER: Primary | ICD-10-CM

## 2017-07-11 DIAGNOSIS — M23.91 DERANGEMENT, KNEE INTERNAL, RIGHT: ICD-10-CM

## 2017-07-17 LAB
ACID FAST MOD KINY STN SPEC: NORMAL
MYCOBACTERIUM SPEC QL CULT: NORMAL

## 2017-07-18 ENCOUNTER — TELEPHONE (OUTPATIENT)
Dept: SPORTS MEDICINE | Facility: CLINIC | Age: 57
End: 2017-07-18

## 2017-07-18 DIAGNOSIS — Z96.659 PAINFUL TOTAL KNEE REPLACEMENT, INITIAL ENCOUNTER: Primary | ICD-10-CM

## 2017-07-18 DIAGNOSIS — T84.84XA PAINFUL TOTAL KNEE REPLACEMENT, INITIAL ENCOUNTER: Primary | ICD-10-CM

## 2017-07-18 NOTE — TELEPHONE ENCOUNTER
----- Message from Madie Kamara MA sent at 7/18/2017 11:09 AM CDT -----  Contact: self       ----- Message -----  From: Lianna Kwon  Sent: 7/18/2017  11:07 AM  To: Armando ATKINSON Staff    Pt is returning a call from Dr. Roberts. Pt can be reached at [phone]

## 2017-07-18 NOTE — TELEPHONE ENCOUNTER
Spoke with the patient. Explained the change in surgery implant with her based on the tibial cut and need for a full total knee revision with Depuy implants. She understood and will keep the same surgery date of 9/12. Will have Dr. Roberts fill out new booking sheet.-

## 2017-07-18 NOTE — TELEPHONE ENCOUNTER
Due to limited implants on the tibial revision procedure of after Primary ConforMIS TKR decision has been made to convert to Embarklyuy Synthes TKR Revision TC3 instrumentation. Call placed to patient's home. Will fill out new surgical form. We will continue to call patient and if she needs to will have her come into the office.

## 2017-09-05 ENCOUNTER — PATIENT MESSAGE (OUTPATIENT)
Dept: SPORTS MEDICINE | Facility: CLINIC | Age: 57
End: 2017-09-05

## 2017-09-06 ENCOUNTER — HOSPITAL ENCOUNTER (OUTPATIENT)
Dept: RADIOLOGY | Facility: HOSPITAL | Age: 57
Discharge: HOME | End: 2017-09-06
Attending: ORTHOPAEDIC SURGERY
Payer: COMMERCIAL

## 2017-09-06 ENCOUNTER — OFFICE VISIT (OUTPATIENT)
Dept: SPORTS MEDICINE | Facility: CLINIC | Age: 57
End: 2017-09-06
Payer: COMMERCIAL

## 2017-09-06 VITALS
HEART RATE: 89 BPM | HEIGHT: 61 IN | DIASTOLIC BLOOD PRESSURE: 95 MMHG | BODY MASS INDEX: 31.15 KG/M2 | SYSTOLIC BLOOD PRESSURE: 141 MMHG | WEIGHT: 165 LBS

## 2017-09-06 DIAGNOSIS — T84.018D FAILURE OF TOTAL KNEE ARTHROPLASTY, SUBSEQUENT ENCOUNTER: ICD-10-CM

## 2017-09-06 DIAGNOSIS — M25.569 KNEE PAIN, UNSPECIFIED CHRONICITY, UNSPECIFIED LATERALITY: Primary | ICD-10-CM

## 2017-09-06 DIAGNOSIS — Z96.653 H/O TOTAL KNEE REPLACEMENT, BILATERAL: ICD-10-CM

## 2017-09-06 DIAGNOSIS — M25.569 KNEE PAIN, UNSPECIFIED CHRONICITY, UNSPECIFIED LATERALITY: ICD-10-CM

## 2017-09-06 DIAGNOSIS — Z96.659 FAILURE OF TOTAL KNEE ARTHROPLASTY, SUBSEQUENT ENCOUNTER: ICD-10-CM

## 2017-09-06 PROBLEM — T84.018A FAILURE OF TOTAL KNEE ARTHROPLASTY: Status: ACTIVE | Noted: 2017-09-06

## 2017-09-06 PROCEDURE — 73564 X-RAY EXAM KNEE 4 OR MORE: CPT | Mod: 26,RT,, | Performed by: RADIOLOGY

## 2017-09-06 PROCEDURE — 73564 X-RAY EXAM KNEE 4 OR MORE: CPT | Mod: TC,50,PO

## 2017-09-06 PROCEDURE — 3008F BODY MASS INDEX DOCD: CPT | Mod: S$GLB,,, | Performed by: ORTHOPAEDIC SURGERY

## 2017-09-06 PROCEDURE — 3080F DIAST BP >= 90 MM HG: CPT | Mod: S$GLB,,, | Performed by: ORTHOPAEDIC SURGERY

## 2017-09-06 PROCEDURE — 99999 PR PBB SHADOW E&M-EST. PATIENT-LVL IV: CPT | Mod: PBBFAC,,, | Performed by: ORTHOPAEDIC SURGERY

## 2017-09-06 PROCEDURE — 99214 OFFICE O/P EST MOD 30 MIN: CPT | Mod: S$GLB,,, | Performed by: ORTHOPAEDIC SURGERY

## 2017-09-06 PROCEDURE — 3077F SYST BP >= 140 MM HG: CPT | Mod: S$GLB,,, | Performed by: ORTHOPAEDIC SURGERY

## 2017-09-06 PROCEDURE — 73564 X-RAY EXAM KNEE 4 OR MORE: CPT | Mod: 26,LT,, | Performed by: RADIOLOGY

## 2017-09-06 RX ORDER — PITAVASTATIN CALCIUM 4.18 MG/1
TABLET, FILM COATED ORAL
COMMUNITY
End: 2022-03-31 | Stop reason: ALTCHOICE

## 2017-09-06 NOTE — PROGRESS NOTES
Subjective:          Chief Complaint: Carleen Kincaid is a 56 y.o. female who had concerns including Follow-up of the Right Knee.    Patient is here for a follow for both of her knees. She is having pain and swelling still but has to postpone her surgery until next year due to kidney stones and a procedure planned for that. She continues to have pain on the back of the right knee. Denies fever or chills. She notes that her knee swells up constantly. Denies numbness or tingling.          DATE OF PROCEDURE: 8/25/2016        POSTOPERATIVE DIAGNOSIS:       1. Left knee  Arthritis, Traumatic M12.50 and Genu Varum (acquired) M21.169  2. Right Knee Internal derangement knee M23.90, Synovitis M65.9 and Tear, Lateral meniscus, old M23.202        PROCEDURES PERFORMED:    1. Left Replacement, Knee, Medial and Lateral compartment (Total Knee) 14017      2. Right knee     Arthroscopy, with lysis of adhesions 26703     3. Right  Arthroscopy, knee, synovectomy, major 58053     4. Right  Arthroscopy, with meniscectomy (medial OR lateral) 79647                     Review of Systems   Constitution: Negative. Negative for chills, fever, weight gain and weight loss.   HENT: Negative for congestion and sore throat.    Eyes: Negative for blurred vision and double vision.   Cardiovascular: Negative for chest pain, leg swelling and palpitations.   Respiratory: Negative for cough and shortness of breath.    Hematologic/Lymphatic: Does not bruise/bleed easily.   Skin: Negative for itching, poor wound healing and rash.   Musculoskeletal: Positive for joint pain, joint swelling and stiffness. Negative for back pain, muscle weakness and myalgias.   Gastrointestinal: Negative for abdominal pain, constipation, diarrhea, nausea and vomiting.   Genitourinary: Negative.  Negative for frequency and hematuria.   Neurological: Negative for dizziness, headaches, numbness, paresthesias and sensory change.   Psychiatric/Behavioral: Negative for altered  mental status and depression. The patient is not nervous/anxious.    Allergic/Immunologic: Negative for hives.       Pain Related Questions  Over the past 3 days, what was your average pain during activity? (I.e. running, jogging, walking, climbing stairs, getting dressed, ect.): 5  Over the past 3 days, what was your highest pain level?: 5  Over the past 3 days, what was your lowest pain level? : 0    Other  How many nights a week are you awakened by your affected body part?: 7  Was the patient's HEIGHT measured or patient reported?: Patient Reported  Was the patient's WEIGHT measured or patient reported?: Measured      Objective:        General: Carleen is well-developed, well-nourished, appears stated age, in no acute distress, alert and oriented to time, place and person.     General    Vitals reviewed.  Constitutional: She is oriented to person, place, and time. She appears well-developed and well-nourished. No distress.   HENT:   Mouth/Throat: No oropharyngeal exudate.   Eyes: Right eye exhibits no discharge. Left eye exhibits no discharge.   Neck: Normal range of motion.   Cardiovascular: Normal rate and regular rhythm.    Pulmonary/Chest: Effort normal and breath sounds normal. No respiratory distress.   Neurological: She is alert and oriented to person, place, and time. She has normal reflexes. No cranial nerve deficit. Coordination normal.   Psychiatric: She has a normal mood and affect. Her behavior is normal. Judgment and thought content normal.     General Musculoskeletal Exam   Gait: varus thrust       Right Knee Exam     Inspection   Erythema: absent  Scars: present  Swelling: present  Effusion: present  Deformity: deformity  Bruising: absent    Tenderness   The patient is tender to palpation of the medial joint line and lateral joint line.    Range of Motion   Extension: 0   Flexion: 130     Tests   Meniscus   Sonal:  Medial - negative Lateral - negative  Ligament Examination Lachman: normal (-1 to  2mm) PCL-Posterior Drawer: normal (0 to 2mm)     MCL - Valgus: abnormal - grade II  LCL - Varus: abnormal - grade IIPivot Shift: normal (Equal)Reverse Pivot Shift: normal (Equal)Dial Test at 30 degrees: normal (< 5 degrees)Dial Test at 90 degrees: normal (< 5 degrees)  Posterior Sag Test: negative  Posterolateral Corner: unstable (>15 degrees difference)  Patella   Patellar Apprehension: negative  Passive Patellar Tilt: neutral  Patellar Tracking: normal  Patellar Glide (quadrants): Lateral - 1   Medial - 2  Q-Angle at 90 degrees: normal  Patellar Grind: negative  J-Sign: none    Other   Meniscal Cyst: absent  Popliteal (Baker's) Cyst: absent  Sensation: normal    Left Knee Exam     Inspection   Erythema: absent  Scars: present  Swelling: absent  Effusion: absent  Deformity: deformity  Bruising: absent    Tenderness   The patient is experiencing no tenderness.         Range of Motion   Extension: 0   Flexion: 130     Tests   Meniscus   Sonal:  Medial - negative Lateral - negative  Stability Lachman: normal (-1 to 2mm) PCL-Posterior Drawer: normal (0 to 2mm)  MCL - Valgus: normal (0 to 2mm)  LCL - Varus: normal (0 to 2mm)Pivot Shift: normal (Equal)Reverse Pivot Shift: normal (Equal)Dial Test at 30 degrees: normal (< 5 degrees)Dial Test at 90 degrees: normal (< 5 degrees)  Posterior Sag Test: negative  Posterolateral Corner: unstable (>15 degrees difference)  Patella   Patellar Apprehension: negative  Passive Patellar Tilt: neutral  Patellar Tracking: normal  Patellar Glide (Quadrants): Lateral - 1 Medial - 2  Q-Angle at 90 degrees: normal  Patellar Grind: negative  J-Sign: J sign absent    Other   Meniscal Cyst: absent  Popliteal (Baker's) Cyst: absent  Sensation: normal    Right Hip Exam     Tests   Julio Cesar: negative  Left Hip Exam     Tests   Julio Cesar: negative          Muscle Strength   Right Lower Extremity   Hip Abduction: 5/5   Quadriceps:  4/5   Hamstrin/5   Left Lower Extremity   Hip Abduction: 5/5    Quadriceps:  4/5   Hamstrin/5     Reflexes     Left Side  Quadriceps:  2+  Achilles:  2+    Right Side   Quadriceps:  2+  Achilles:  2+    Vascular Exam     Right Pulses  Dorsalis Pedis:      2+  Posterior Tibial:      2+        Left Pulses  Dorsalis Pedis:      2+  Posterior Tibial:      2+        Edema  Right Lower Leg: absent  Left Lower Leg: absent      RADIOGRAPHS TODAY: Bilateral knee arthroplasties are identified.  The position and alignment is satisfactory and unchanged as compared to the previous study.  No fracture or bone destruction identified            Assessment:       Encounter Diagnoses   Name Primary?    Knee pain, unspecified chronicity, unspecified laterality Yes    H/O total knee replacement, bilateral     Failure of total knee arthroplasty, subsequent encounter           Plan:       1. IKDC, SF-12 and KOOS was filled out today in clinic.     RTC in 3 months with Dr. Sri Roberts Patient will fill out IKDC, SF-12 and KOOS on return.    2. Continue with activities as tolerated with  brace    3. We reviewed with Carleen today, the pathology and natural history of her diagnosis. We have discussed a variety of treatment options including medications, physical therapy and other alternative treatments. I also explained the indications, risks and benefits of surgery. After discussion, Carleen decided to proceed with surgery. The decision was made to go forward with :  1. Right knee revision total knee arthroplasty (TC3)  2. Right knee femoral autologous bone grafting    SCHEDULED FOR 2018    The details of the surgical procedure were explained, including the location of probable incisions and a description of likely hardware and/or grafts to be used.  The patient understands the likely convalescence after surgery.  Also, we have thoroughly discussed the risks, benefits and alternatives to surgery, including, but not limited to, the risk of infection, joint stiffness, blood clot  (including DVT and/or pulmonary embolus), neurologic and vascular injury.  It was explained that, if tissue has been repaired or reconstructed, there is a chance of failure, which may require further management.      All of the patient's questions were answered and informed consent was obtained. The patient will contact us if they have any questions or concerns in the interim.    4. PT at [facility] in [facility], [place]                      Patient questionnaires may have been collected.

## 2017-11-15 ENCOUNTER — PATIENT MESSAGE (OUTPATIENT)
Dept: SPORTS MEDICINE | Facility: CLINIC | Age: 57
End: 2017-11-15

## 2017-11-17 DIAGNOSIS — T84.84XA PAINFUL ORTHOPAEDIC HARDWARE: Primary | ICD-10-CM

## 2017-11-22 RX ORDER — ADALIMUMAB 40MG/0.8ML
KIT SUBCUTANEOUS
Qty: 2 VIAL | Refills: 6 | Status: SHIPPED | OUTPATIENT
Start: 2017-11-22 | End: 2017-12-06 | Stop reason: SDUPTHER

## 2017-12-06 ENCOUNTER — ANESTHESIA EVENT (OUTPATIENT)
Dept: SURGERY | Facility: OTHER | Age: 57
DRG: 468 | End: 2017-12-06
Payer: COMMERCIAL

## 2017-12-06 ENCOUNTER — OFFICE VISIT (OUTPATIENT)
Dept: SPORTS MEDICINE | Facility: CLINIC | Age: 57
End: 2017-12-06
Payer: COMMERCIAL

## 2017-12-06 ENCOUNTER — HOSPITAL ENCOUNTER (OUTPATIENT)
Dept: PREADMISSION TESTING | Facility: OTHER | Age: 57
Discharge: HOME | End: 2017-12-06
Attending: ORTHOPAEDIC SURGERY
Payer: COMMERCIAL

## 2017-12-06 VITALS
DIASTOLIC BLOOD PRESSURE: 106 MMHG | HEIGHT: 61 IN | BODY MASS INDEX: 31.15 KG/M2 | HEART RATE: 76 BPM | WEIGHT: 165 LBS | SYSTOLIC BLOOD PRESSURE: 153 MMHG

## 2017-12-06 VITALS
HEIGHT: 61 IN | HEART RATE: 83 BPM | DIASTOLIC BLOOD PRESSURE: 93 MMHG | TEMPERATURE: 98 F | OXYGEN SATURATION: 100 % | SYSTOLIC BLOOD PRESSURE: 138 MMHG | BODY MASS INDEX: 31.15 KG/M2 | WEIGHT: 165 LBS

## 2017-12-06 DIAGNOSIS — M17.12 LOCALIZED PRIMARY OSTEOARTHRITIS OF LEFT LOWER LEG: ICD-10-CM

## 2017-12-06 DIAGNOSIS — M25.561 CHRONIC PAIN OF RIGHT KNEE: Primary | ICD-10-CM

## 2017-12-06 DIAGNOSIS — Z96.653 HISTORY OF TOTAL BILATERAL KNEE REPLACEMENT: ICD-10-CM

## 2017-12-06 DIAGNOSIS — G89.29 CHRONIC PAIN OF RIGHT KNEE: Primary | ICD-10-CM

## 2017-12-06 DIAGNOSIS — M23.91 INTERNAL DERANGEMENT OF RIGHT KNEE: ICD-10-CM

## 2017-12-06 PROCEDURE — 99499 UNLISTED E&M SERVICE: CPT | Mod: S$GLB,,, | Performed by: ORTHOPAEDIC SURGERY

## 2017-12-06 PROCEDURE — 99999 PR PBB SHADOW E&M-EST. PATIENT-LVL III: CPT | Mod: PBBFAC,,, | Performed by: ORTHOPAEDIC SURGERY

## 2017-12-06 RX ORDER — ASPIRIN 325 MG
325 TABLET, DELAYED RELEASE (ENTERIC COATED) ORAL DAILY
Refills: 0 | COMMUNITY
Start: 2017-12-06 | End: 2018-06-18 | Stop reason: ALTCHOICE

## 2017-12-06 RX ORDER — DICLOFENAC SODIUM 75 MG/1
75 TABLET, DELAYED RELEASE ORAL 2 TIMES DAILY
Qty: 60 TABLET | Refills: 0 | Status: SHIPPED | OUTPATIENT
Start: 2017-12-06 | End: 2018-03-05 | Stop reason: SDUPTHER

## 2017-12-06 RX ORDER — FAMOTIDINE 20 MG/1
20 TABLET, FILM COATED ORAL
Status: CANCELLED | OUTPATIENT
Start: 2017-12-06 | End: 2017-12-06

## 2017-12-06 RX ORDER — TRAMADOL HYDROCHLORIDE 50 MG/1
50 TABLET ORAL EVERY 6 HOURS PRN
Qty: 40 TABLET | Refills: 0 | Status: SHIPPED | OUTPATIENT
Start: 2017-12-06 | End: 2017-12-16

## 2017-12-06 RX ORDER — MUPIROCIN 20 MG/G
OINTMENT TOPICAL
Status: CANCELLED | OUTPATIENT
Start: 2017-12-06

## 2017-12-06 RX ORDER — SODIUM CHLORIDE 9 MG/ML
INJECTION, SOLUTION INTRAVENOUS CONTINUOUS
Status: CANCELLED | OUTPATIENT
Start: 2017-12-06

## 2017-12-06 RX ORDER — MIDAZOLAM HYDROCHLORIDE 5 MG/ML
4 INJECTION INTRAMUSCULAR; INTRAVENOUS ONCE AS NEEDED
Status: CANCELLED | OUTPATIENT
Start: 2017-12-06 | End: 2017-12-06

## 2017-12-06 RX ORDER — PROMETHAZINE HYDROCHLORIDE 25 MG/1
25 TABLET ORAL EVERY 4 HOURS
Qty: 60 TABLET | Refills: 0 | Status: SHIPPED | OUTPATIENT
Start: 2017-12-06 | End: 2018-06-18 | Stop reason: ALTCHOICE

## 2017-12-06 RX ORDER — NITROFURANTOIN 25; 75 MG/1; MG/1
CAPSULE ORAL
COMMUNITY
Start: 2017-11-30 | End: 2018-06-18 | Stop reason: ALTCHOICE

## 2017-12-06 RX ORDER — LIDOCAINE HYDROCHLORIDE 10 MG/ML
1 INJECTION, SOLUTION EPIDURAL; INFILTRATION; INTRACAUDAL; PERINEURAL ONCE
Status: CANCELLED | OUTPATIENT
Start: 2017-12-06 | End: 2017-12-06

## 2017-12-06 RX ORDER — HYDROCODONE BITARTRATE AND ACETAMINOPHEN 10; 325 MG/1; MG/1
1 TABLET ORAL EVERY 6 HOURS PRN
Qty: 60 TABLET | Refills: 0 | Status: SHIPPED | OUTPATIENT
Start: 2017-12-06 | End: 2018-01-03 | Stop reason: SDUPTHER

## 2017-12-06 RX ORDER — SODIUM CHLORIDE, SODIUM LACTATE, POTASSIUM CHLORIDE, CALCIUM CHLORIDE 600; 310; 30; 20 MG/100ML; MG/100ML; MG/100ML; MG/100ML
INJECTION, SOLUTION INTRAVENOUS CONTINUOUS
Status: CANCELLED | OUTPATIENT
Start: 2017-12-06

## 2017-12-06 NOTE — DISCHARGE INSTRUCTIONS
PRE-ADMIT TESTING -  [phone]    [address]  [place] [facility]          Your surgery has been scheduled at [facility]. We are pleased to have the opportunity to serve you. For Further Information please call [phone].    On the day of surgery please report to the Information Desk on the 1st floor.    · CONTACT YOUR PHYSICIAN'S OFFICE THE DAY PRIOR TO YOUR SURGERY TO OBTAIN YOUR ARRIVAL TIME.     · The evening before surgery do not eat anything after 9 p.m. ( this includes hard candy, chewing gum and mints).  You may only have GATORADE, POWERADE AND WATER  from 9 p.m. until you leave your home.   DO NOT DRINK ANY LIQUIDS ON THE WAY TO THE HOSPITAL.      SPECIAL MEDICATION INSTRUCTIONS: TAKE medications checked off by the Anesthesiologist on your Medication List.    Angiogram Patients: Take medications as instructed by your physician, including aspirin.     Surgery Patients:    If you take ASPIRIN - Your PHYSICIAN/SURGEON will need to inform you IF/OR when you need to stop taking aspirin prior to your surgery.     Do Not take any medications containing IBUPROFEN.  Do Not Wear any make-up or dark nail polish   (especially eye make-up) to surgery. If you come to surgery with makeup on you will be required to remove the makeup or nail polish.    Do not shave your surgical area at least 5 days prior to your surgery. The surgical prep will be performed at the hospital according to Infection Control regulations.    Leave all valuables at home.   Do Not wear any jewelry or watches, including any metal in body piercings.  Contact Lens must be removed before surgery. Either do not wear the contact lens or bring a case and solution for storage.  Please bring a container for eyeglasses or dentures as required.  Bring any paperwork your physician has provided, such as consent forms,  history and physicals, doctor's orders, etc.   Bring comfortable clothes that are loose fitting to wear upon  discharge. Take into consideration the type of surgery being performed.  Maintain your diet as advised per your physician the day prior to surgery.      Adequate rest the night before surgery is advised.   Park in the Parking lot behind the hospital or in the [facility] across the street from the parking lot. Parking is complimentary.  If you will be discharged the same day as your procedure, please arrange for a responsible adult to drive you home or to accompany you if traveling by taxi.   YOU WILL NOT BE PERMITTED TO DRIVE OR TO LEAVE THE HOSPITAL ALONE AFTER SURGERY.   It is strongly recommended that you arrange for someone to remain with you for the first 24 hrs following your surgery.       Thank you for your cooperation.  The Staff of [facility].        Bathing Instructions                                                                 Please shower the evening before and morning of your procedure with    ANTIBACTERIAL SOAP. ( DIAL, etc )  Concentrate on the surgical area   for at least 3 minutes and rinse completely. Dry off as usual.   Do not use any deodorant, powder, body lotions, perfume, after shave or    cologne.

## 2017-12-06 NOTE — PROGRESS NOTES
Carleen Kincaid  is here for a completion of her perioperative paperwork. she  Is scheduled to undergo     1. Right knee revision total knee arthroplasty (TC3)  2. Right knee femoral autologous bone grafting     on 12/12/17.  She is a healthy individual and does need clearance for this procedure.     Risks, indications and benefits of the surgical procedure were discussed with the patient. All questions with regard to surgery, rehab, expected return to functional activities, activities of daily living and recreational endeavors were answered to her satisfaction.    Once no other questions were asked, a brief history and physical exam was then performed.      PHYSICAL EXAM:  GEN: A&Ox3, WD WN NAD  HEENT: WNL  CHEST: CTAB, no W/R/R  HEART: RRR, no M/R/G  ABD: Soft, NT ND, BS x4 QUADS  MS; See Epic  NEURO: CN II-XII intact       The surgical consent was then reviewed with the patient, who agreed with all the contents of the consent form and it was signed. she was then given the [facility] [place] surgery packet to bring with her to [facility] [place] for the anesthesia portion of her perioperative paperwork.     PHYSICAL THERAPY:  She was also instructed regarding physical therapy and will begin on  POD#3. She was given a copy of the original prescription to schedule. Another copy of this prescription was also faxed to her therapist.    POST OP CARE:instructions were reviewed including care of the wound and dressing after surgery and when she can shower.     PAIN MANAGEMENT: Carleen Kincaid was also given Norco which works better for her, diclofenac which works better for her, and Tramadol for breakthrough pain pain management regime, which includes the TENS unit given to her by Adelso Ballard along with the education required for its use. She was also instructed regarding the Polar ice unit that will be in place after surgery and her postoperative pain medications.     PAIN MEDICATION:  Norco 10/325mg 1 po q 4-6 hours prn pain  Ultram 50 mg  one p.o. q.4-6 hours p.r.n. breakthrough pain,   Phenergan 25 mg one p.o. q.4-6 hours p.r.n. nausea and vomiting.  Diclofenac EC 75 mg PO BID   mg PO daily for dvt ppx    As there were no other questions to be asked, she was given my business card along with Sri Roberts MD business card if she has any questions or concerns prior to surgery or in the postop period.

## 2017-12-06 NOTE — ANESTHESIA PREPROCEDURE EVALUATION
12/06/2017  Carleen Kincaid is a 57 y.o., female.    Anesthesia Evaluation    I have reviewed the Patient Summary Reports.    I have reviewed the Nursing Notes.   I have reviewed the Medications.     Review of Systems  Anesthesia Hx:  Denies Hx of Anesthetic complications  History of prior surgery of interest to airway management or planning: Previous anesthesia: General 2016 TKA with general anesthesia.  Airway issues documented on chart review include mask, easy, GETA, easy direct laryngoscopy    Social:  Non-Smoker    Cardiovascular:   Hypertension, well controlled    Pulmonary:  Pulmonary Normal    Renal/:   Chronic Renal Disease renal calculi    Hepatic/GI:  Hepatic/GI Normal    Musculoskeletal:   Arthritis     Neurological:   CVA, no residual symptoms    Endocrine:  Endocrine Normal    Dermatological:   psoriasis       Physical Exam  General:  Obesity    Airway/Jaw/Neck:  Airway Findings: Mouth Opening: Normal Tongue: Normal  General Airway Assessment: Adult  Mallampati: II  TM Distance: 4 - 6 cm  Jaw/Neck Findings:     Neck ROM: Normal ROM      Dental:  Dental Findings: In tact             Anesthesia Plan  Type of Anesthesia, risks & benefits discussed:  Anesthesia Type:  general  Patient's Preference:   Intra-op Monitoring Plan:   Intra-op Monitoring Plan Comments:   Post Op Pain Control Plan: peripheral nerve block  Post Op Pain Control Plan Comments:   Induction:   IV  Beta Blocker:         Informed Consent: Patient understands risks and agrees with Anesthesia plan.  Questions answered. Anesthesia consent signed with patient.  ASA Score: 2     Day of Surgery Review of History & Physical:    H&P update referred to the surgeon.         Ready For Surgery From Anesthesia Perspective.

## 2017-12-12 ENCOUNTER — ANESTHESIA (OUTPATIENT)
Dept: SURGERY | Facility: OTHER | Age: 57
DRG: 468 | End: 2017-12-12
Payer: COMMERCIAL

## 2017-12-12 ENCOUNTER — HOSPITAL ENCOUNTER (INPATIENT)
Facility: OTHER | Age: 57
LOS: 1 days | Discharge: HOME HEALTH | DRG: 468 | End: 2017-12-13
Attending: ORTHOPAEDIC SURGERY | Admitting: ORTHOPAEDIC SURGERY
Payer: COMMERCIAL

## 2017-12-12 DIAGNOSIS — T84.018D FAILURE OF TOTAL KNEE REPLACEMENT, SUBSEQUENT ENCOUNTER: Primary | ICD-10-CM

## 2017-12-12 DIAGNOSIS — G89.29 CHRONIC PAIN OF RIGHT KNEE: ICD-10-CM

## 2017-12-12 DIAGNOSIS — M25.561 CHRONIC PAIN OF RIGHT KNEE: ICD-10-CM

## 2017-12-12 DIAGNOSIS — Z96.659 FAILURE OF TOTAL KNEE REPLACEMENT, SUBSEQUENT ENCOUNTER: Primary | ICD-10-CM

## 2017-12-12 LAB
HCT VFR BLD AUTO: 36.3 %
HGB BLD-MCNC: 11.9 G/DL

## 2017-12-12 PROCEDURE — 11000001 HC ACUTE MED/SURG PRIVATE ROOM

## 2017-12-12 PROCEDURE — 97116 GAIT TRAINING THERAPY: CPT

## 2017-12-12 PROCEDURE — 71000039 HC RECOVERY, EACH ADD'L HOUR: Performed by: ORTHOPAEDIC SURGERY

## 2017-12-12 PROCEDURE — 97110 THERAPEUTIC EXERCISES: CPT

## 2017-12-12 PROCEDURE — 36000711: Performed by: ORTHOPAEDIC SURGERY

## 2017-12-12 PROCEDURE — 27800903 OPTIME MED/SURG SUP & DEVICES OTHER IMPLANTS: Performed by: ORTHOPAEDIC SURGERY

## 2017-12-12 PROCEDURE — 63600175 PHARM REV CODE 636 W HCPCS: Performed by: ORTHOPAEDIC SURGERY

## 2017-12-12 PROCEDURE — 3E0T3BZ INTRODUCTION OF ANESTHETIC AGENT INTO PERIPHERAL NERVES AND PLEXI, PERCUTANEOUS APPROACH: ICD-10-PCS | Performed by: ANESTHESIOLOGY

## 2017-12-12 PROCEDURE — C1729 CATH, DRAINAGE: HCPCS | Performed by: ORTHOPAEDIC SURGERY

## 2017-12-12 PROCEDURE — 63600175 PHARM REV CODE 636 W HCPCS: Performed by: NURSE ANESTHETIST, CERTIFIED REGISTERED

## 2017-12-12 PROCEDURE — 27487 REVISE/REPLACE KNEE JOINT: CPT | Mod: 62,22,RT, | Performed by: ORTHOPAEDIC SURGERY

## 2017-12-12 PROCEDURE — 85014 HEMATOCRIT: CPT

## 2017-12-12 PROCEDURE — 97162 PT EVAL MOD COMPLEX 30 MIN: CPT

## 2017-12-12 PROCEDURE — 94761 N-INVAS EAR/PLS OXIMETRY MLT: CPT

## 2017-12-12 PROCEDURE — 25000003 PHARM REV CODE 250: Performed by: ORTHOPAEDIC SURGERY

## 2017-12-12 PROCEDURE — 63600175 PHARM REV CODE 636 W HCPCS: Performed by: ANESTHESIOLOGY

## 2017-12-12 PROCEDURE — 25000003 PHARM REV CODE 250: Performed by: ANESTHESIOLOGY

## 2017-12-12 PROCEDURE — 36000710: Performed by: ORTHOPAEDIC SURGERY

## 2017-12-12 PROCEDURE — 25000003 PHARM REV CODE 250: Performed by: NURSE ANESTHETIST, CERTIFIED REGISTERED

## 2017-12-12 PROCEDURE — 27201423 OPTIME MED/SURG SUP & DEVICES STERILE SUPPLY: Performed by: ORTHOPAEDIC SURGERY

## 2017-12-12 PROCEDURE — 97530 THERAPEUTIC ACTIVITIES: CPT

## 2017-12-12 PROCEDURE — C1713 ANCHOR/SCREW BN/BN,TIS/BN: HCPCS | Performed by: ORTHOPAEDIC SURGERY

## 2017-12-12 PROCEDURE — 0SPC0JZ REMOVAL OF SYNTHETIC SUBSTITUTE FROM RIGHT KNEE JOINT, OPEN APPROACH: ICD-10-PCS | Performed by: ORTHOPAEDIC SURGERY

## 2017-12-12 PROCEDURE — C1776 JOINT DEVICE (IMPLANTABLE): HCPCS | Performed by: ORTHOPAEDIC SURGERY

## 2017-12-12 PROCEDURE — C9290 INJ, BUPIVACAINE LIPOSOME: HCPCS | Performed by: ORTHOPAEDIC SURGERY

## 2017-12-12 PROCEDURE — 71000033 HC RECOVERY, INTIAL HOUR: Performed by: ORTHOPAEDIC SURGERY

## 2017-12-12 PROCEDURE — 37000009 HC ANESTHESIA EA ADD 15 MINS: Performed by: ORTHOPAEDIC SURGERY

## 2017-12-12 PROCEDURE — S0020 INJECTION, BUPIVICAINE HYDRO: HCPCS | Performed by: ORTHOPAEDIC SURGERY

## 2017-12-12 PROCEDURE — 0SRC0J9 REPLACEMENT OF RIGHT KNEE JOINT WITH SYNTHETIC SUBSTITUTE, CEMENTED, OPEN APPROACH: ICD-10-PCS | Performed by: ORTHOPAEDIC SURGERY

## 2017-12-12 PROCEDURE — 37000008 HC ANESTHESIA 1ST 15 MINUTES: Performed by: ORTHOPAEDIC SURGERY

## 2017-12-12 PROCEDURE — 85018 HEMOGLOBIN: CPT

## 2017-12-12 PROCEDURE — 99900035 HC TECH TIME PER 15 MIN (STAT)

## 2017-12-12 DEVICE — STEM UNIVERSAL FLUTED 75X16MM: Type: IMPLANTABLE DEVICE | Site: FEMUR | Status: FUNCTIONAL

## 2017-12-12 DEVICE — SLEEVE TIBIAL MED/LG 29MM: Type: IMPLANTABLE DEVICE | Site: TIBIA | Status: FUNCTIONAL

## 2017-12-12 DEVICE — BOLT ADAPTER KNEE FEM OFFS: Type: IMPLANTABLE DEVICE | Site: FEMUR | Status: FUNCTIONAL

## 2017-12-12 DEVICE — ADAPTER FEM STEM PFC SGM 5 DEG: Type: IMPLANTABLE DEVICE | Site: FEMUR | Status: FUNCTIONAL

## 2017-12-12 DEVICE — CEMENT BONE IMPLANT: Type: IMPLANTABLE DEVICE | Site: KNEE | Status: FUNCTIONAL

## 2017-12-12 DEVICE — CHIPS CANCELLOUS 30CC: Type: IMPLANTABLE DEVICE | Site: KNEE | Status: FUNCTIONAL

## 2017-12-12 DEVICE — IMPLANTABLE DEVICE: Type: IMPLANTABLE DEVICE | Site: TIBIA | Status: FUNCTIONAL

## 2017-12-12 DEVICE — SLEEVE FEMORAL UNIV 31MM: Type: IMPLANTABLE DEVICE | Site: FEMUR | Status: FUNCTIONAL

## 2017-12-12 DEVICE — STEM UNIVERSAL FLUTED 75X14MM: Type: IMPLANTABLE DEVICE | Site: TIBIA | Status: FUNCTIONAL

## 2017-12-12 RX ORDER — TRANEXAMIC ACID 100 MG/ML
INJECTION, SOLUTION INTRAVENOUS
Status: DISCONTINUED | OUTPATIENT
Start: 2017-12-12 | End: 2017-12-12

## 2017-12-12 RX ORDER — FENTANYL CITRATE 50 UG/ML
25 INJECTION, SOLUTION INTRAMUSCULAR; INTRAVENOUS EVERY 5 MIN PRN
Status: DISCONTINUED | OUTPATIENT
Start: 2017-12-12 | End: 2017-12-12 | Stop reason: HOSPADM

## 2017-12-12 RX ORDER — CEFAZOLIN SODIUM 2 G/50ML
2 SOLUTION INTRAVENOUS
Status: DISCONTINUED | OUTPATIENT
Start: 2017-12-12 | End: 2017-12-12 | Stop reason: HOSPADM

## 2017-12-12 RX ORDER — FAMOTIDINE 20 MG/1
20 TABLET, FILM COATED ORAL
Status: COMPLETED | OUTPATIENT
Start: 2017-12-12 | End: 2017-12-12

## 2017-12-12 RX ORDER — CYCLOBENZAPRINE HCL 5 MG
TABLET ORAL DAILY PRN
COMMUNITY
End: 2019-04-24 | Stop reason: SDUPTHER

## 2017-12-12 RX ORDER — NEOSTIGMINE METHYLSULFATE 1 MG/ML
INJECTION, SOLUTION INTRAVENOUS
Status: DISCONTINUED | OUTPATIENT
Start: 2017-12-12 | End: 2017-12-12

## 2017-12-12 RX ORDER — ROCURONIUM BROMIDE 10 MG/ML
INJECTION, SOLUTION INTRAVENOUS
Status: DISCONTINUED | OUTPATIENT
Start: 2017-12-12 | End: 2017-12-12

## 2017-12-12 RX ORDER — SODIUM CHLORIDE, SODIUM LACTATE, POTASSIUM CHLORIDE, CALCIUM CHLORIDE 600; 310; 30; 20 MG/100ML; MG/100ML; MG/100ML; MG/100ML
INJECTION, SOLUTION INTRAVENOUS CONTINUOUS
Status: DISCONTINUED | OUTPATIENT
Start: 2017-12-12 | End: 2017-12-12

## 2017-12-12 RX ORDER — HYDROMORPHONE HYDROCHLORIDE 2 MG/ML
0.4 INJECTION, SOLUTION INTRAMUSCULAR; INTRAVENOUS; SUBCUTANEOUS EVERY 5 MIN PRN
Status: DISCONTINUED | OUTPATIENT
Start: 2017-12-12 | End: 2017-12-12 | Stop reason: HOSPADM

## 2017-12-12 RX ORDER — OXYCODONE HYDROCHLORIDE 5 MG/1
5 TABLET ORAL ONCE AS NEEDED
Status: ACTIVE | OUTPATIENT
Start: 2017-12-12 | End: 2017-12-12

## 2017-12-12 RX ORDER — PROPOFOL 10 MG/ML
VIAL (ML) INTRAVENOUS
Status: DISCONTINUED | OUTPATIENT
Start: 2017-12-12 | End: 2017-12-12

## 2017-12-12 RX ORDER — FENTANYL CITRATE 50 UG/ML
100 INJECTION, SOLUTION INTRAMUSCULAR; INTRAVENOUS EVERY 5 MIN PRN
Status: DISCONTINUED | OUTPATIENT
Start: 2017-12-12 | End: 2017-12-13 | Stop reason: HOSPADM

## 2017-12-12 RX ORDER — MEPERIDINE HYDROCHLORIDE 50 MG/ML
12.5 INJECTION INTRAMUSCULAR; INTRAVENOUS; SUBCUTANEOUS ONCE AS NEEDED
Status: DISCONTINUED | OUTPATIENT
Start: 2017-12-12 | End: 2017-12-12 | Stop reason: SDUPTHER

## 2017-12-12 RX ORDER — ROPIVACAINE HYDROCHLORIDE 5 MG/ML
INJECTION, SOLUTION EPIDURAL; INFILTRATION; PERINEURAL
Status: DISCONTINUED | OUTPATIENT
Start: 2017-12-12 | End: 2017-12-12

## 2017-12-12 RX ORDER — DICLOFENAC SODIUM 75 MG/1
75 TABLET, DELAYED RELEASE ORAL 2 TIMES DAILY
Status: DISCONTINUED | OUTPATIENT
Start: 2017-12-12 | End: 2017-12-13 | Stop reason: HOSPADM

## 2017-12-12 RX ORDER — OXYCODONE HYDROCHLORIDE 5 MG/1
5 TABLET ORAL
Status: DISCONTINUED | OUTPATIENT
Start: 2017-12-12 | End: 2017-12-12 | Stop reason: HOSPADM

## 2017-12-12 RX ORDER — MUPIROCIN 20 MG/G
OINTMENT TOPICAL
Status: DISCONTINUED | OUTPATIENT
Start: 2017-12-12 | End: 2017-12-12 | Stop reason: HOSPADM

## 2017-12-12 RX ORDER — SODIUM CHLORIDE 9 MG/ML
INJECTION, SOLUTION INTRAVENOUS CONTINUOUS
Status: DISCONTINUED | OUTPATIENT
Start: 2017-12-12 | End: 2017-12-12

## 2017-12-12 RX ORDER — FENTANYL CITRATE 50 UG/ML
INJECTION, SOLUTION INTRAMUSCULAR; INTRAVENOUS
Status: DISCONTINUED | OUTPATIENT
Start: 2017-12-12 | End: 2017-12-12

## 2017-12-12 RX ORDER — ONDANSETRON 2 MG/ML
INJECTION INTRAMUSCULAR; INTRAVENOUS
Status: DISCONTINUED | OUTPATIENT
Start: 2017-12-12 | End: 2017-12-12

## 2017-12-12 RX ORDER — LIDOCAINE HCL/PF 100 MG/5ML
SYRINGE (ML) INTRAVENOUS
Status: DISCONTINUED | OUTPATIENT
Start: 2017-12-12 | End: 2017-12-12

## 2017-12-12 RX ORDER — DOCUSATE SODIUM 100 MG/1
100 CAPSULE, LIQUID FILLED ORAL 2 TIMES DAILY
Status: DISCONTINUED | OUTPATIENT
Start: 2017-12-12 | End: 2017-12-13 | Stop reason: HOSPADM

## 2017-12-12 RX ORDER — MIDAZOLAM HYDROCHLORIDE 1 MG/ML
INJECTION INTRAMUSCULAR; INTRAVENOUS
Status: DISCONTINUED | OUTPATIENT
Start: 2017-12-12 | End: 2017-12-12

## 2017-12-12 RX ORDER — TRANEXAMIC ACID 100 MG/ML
1000 INJECTION, SOLUTION INTRAVENOUS ONCE
Status: DISCONTINUED | OUTPATIENT
Start: 2017-12-12 | End: 2017-12-13 | Stop reason: HOSPADM

## 2017-12-12 RX ORDER — CYCLOBENZAPRINE HCL 5 MG
5 TABLET ORAL DAILY PRN
Status: DISCONTINUED | OUTPATIENT
Start: 2017-12-12 | End: 2017-12-13 | Stop reason: HOSPADM

## 2017-12-12 RX ORDER — SODIUM CHLORIDE 0.9 % (FLUSH) 0.9 %
3 SYRINGE (ML) INJECTION
Status: DISCONTINUED | OUTPATIENT
Start: 2017-12-12 | End: 2017-12-12 | Stop reason: SDUPTHER

## 2017-12-12 RX ORDER — ONDANSETRON 2 MG/ML
4 INJECTION INTRAMUSCULAR; INTRAVENOUS DAILY PRN
Status: DISCONTINUED | OUTPATIENT
Start: 2017-12-12 | End: 2017-12-12 | Stop reason: HOSPADM

## 2017-12-12 RX ORDER — HYDROMORPHONE HYDROCHLORIDE 2 MG/ML
0.4 INJECTION, SOLUTION INTRAMUSCULAR; INTRAVENOUS; SUBCUTANEOUS EVERY 5 MIN PRN
Status: DISCONTINUED | OUTPATIENT
Start: 2017-12-12 | End: 2017-12-12 | Stop reason: SDUPTHER

## 2017-12-12 RX ORDER — PHENYLEPHRINE HYDROCHLORIDE 10 MG/ML
INJECTION INTRAVENOUS
Status: DISCONTINUED | OUTPATIENT
Start: 2017-12-12 | End: 2017-12-12

## 2017-12-12 RX ORDER — OXYCODONE HCL 10 MG/1
10 TABLET, FILM COATED, EXTENDED RELEASE ORAL EVERY 12 HOURS
Status: DISCONTINUED | OUTPATIENT
Start: 2017-12-12 | End: 2017-12-13 | Stop reason: HOSPADM

## 2017-12-12 RX ORDER — BACITRACIN 50000 [IU]/1
INJECTION, POWDER, FOR SOLUTION INTRAMUSCULAR
Status: DISCONTINUED | OUTPATIENT
Start: 2017-12-12 | End: 2017-12-12 | Stop reason: HOSPADM

## 2017-12-12 RX ORDER — MIDAZOLAM HYDROCHLORIDE 5 MG/ML
4 INJECTION INTRAMUSCULAR; INTRAVENOUS ONCE AS NEEDED
Status: COMPLETED | OUTPATIENT
Start: 2017-12-12 | End: 2017-12-12

## 2017-12-12 RX ORDER — BUPIVACAINE HYDROCHLORIDE 5 MG/ML
INJECTION, SOLUTION EPIDURAL; INTRACAUDAL
Status: DISCONTINUED | OUTPATIENT
Start: 2017-12-12 | End: 2017-12-12 | Stop reason: HOSPADM

## 2017-12-12 RX ORDER — FENTANYL CITRATE 50 UG/ML
25 INJECTION, SOLUTION INTRAMUSCULAR; INTRAVENOUS EVERY 5 MIN PRN
Status: DISCONTINUED | OUTPATIENT
Start: 2017-12-12 | End: 2017-12-12 | Stop reason: SDUPTHER

## 2017-12-12 RX ORDER — ACETAMINOPHEN 10 MG/ML
INJECTION, SOLUTION INTRAVENOUS
Status: DISCONTINUED | OUTPATIENT
Start: 2017-12-12 | End: 2017-12-12

## 2017-12-12 RX ORDER — MIDAZOLAM HYDROCHLORIDE 1 MG/ML
2 INJECTION INTRAMUSCULAR; INTRAVENOUS ONCE
Status: DISCONTINUED | OUTPATIENT
Start: 2017-12-12 | End: 2017-12-13 | Stop reason: HOSPADM

## 2017-12-12 RX ORDER — HYDROMORPHONE HYDROCHLORIDE 1 MG/ML
0.5 INJECTION, SOLUTION INTRAMUSCULAR; INTRAVENOUS; SUBCUTANEOUS EVERY 4 HOURS PRN
Status: DISCONTINUED | OUTPATIENT
Start: 2017-12-12 | End: 2017-12-13 | Stop reason: HOSPADM

## 2017-12-12 RX ORDER — ASPIRIN 325 MG
325 TABLET, DELAYED RELEASE (ENTERIC COATED) ORAL DAILY
Status: DISCONTINUED | OUTPATIENT
Start: 2017-12-13 | End: 2017-12-13 | Stop reason: HOSPADM

## 2017-12-12 RX ORDER — HYDROCODONE BITARTRATE AND ACETAMINOPHEN 10; 325 MG/1; MG/1
1 TABLET ORAL EVERY 6 HOURS PRN
Status: DISCONTINUED | OUTPATIENT
Start: 2017-12-12 | End: 2017-12-13 | Stop reason: HOSPADM

## 2017-12-12 RX ORDER — DIPHENHYDRAMINE HYDROCHLORIDE 50 MG/ML
12.5 INJECTION INTRAMUSCULAR; INTRAVENOUS EVERY 30 MIN PRN
Status: DISCONTINUED | OUTPATIENT
Start: 2017-12-12 | End: 2017-12-12 | Stop reason: HOSPADM

## 2017-12-12 RX ORDER — HYDROCODONE BITARTRATE AND ACETAMINOPHEN 10; 325 MG/1; MG/1
2 TABLET ORAL EVERY 6 HOURS PRN
Status: DISCONTINUED | OUTPATIENT
Start: 2017-12-12 | End: 2017-12-13 | Stop reason: HOSPADM

## 2017-12-12 RX ORDER — ONDANSETRON 2 MG/ML
4 INJECTION INTRAMUSCULAR; INTRAVENOUS DAILY PRN
Status: DISCONTINUED | OUTPATIENT
Start: 2017-12-12 | End: 2017-12-12 | Stop reason: SDUPTHER

## 2017-12-12 RX ORDER — LIDOCAINE HYDROCHLORIDE 10 MG/ML
1 INJECTION, SOLUTION EPIDURAL; INFILTRATION; INTRACAUDAL; PERINEURAL ONCE
Status: DISCONTINUED | OUTPATIENT
Start: 2017-12-12 | End: 2017-12-12 | Stop reason: HOSPADM

## 2017-12-12 RX ORDER — MUPIROCIN 20 MG/G
1 OINTMENT TOPICAL 2 TIMES DAILY
Status: DISCONTINUED | OUTPATIENT
Start: 2017-12-12 | End: 2017-12-13 | Stop reason: HOSPADM

## 2017-12-12 RX ORDER — SODIUM CHLORIDE 0.9 % (FLUSH) 0.9 %
3 SYRINGE (ML) INJECTION
Status: DISCONTINUED | OUTPATIENT
Start: 2017-12-12 | End: 2017-12-13 | Stop reason: HOSPADM

## 2017-12-12 RX ORDER — DEXAMETHASONE SODIUM PHOSPHATE 4 MG/ML
INJECTION, SOLUTION INTRA-ARTICULAR; INTRALESIONAL; INTRAMUSCULAR; INTRAVENOUS; SOFT TISSUE
Status: DISCONTINUED | OUTPATIENT
Start: 2017-12-12 | End: 2017-12-12

## 2017-12-12 RX ORDER — ONDANSETRON 2 MG/ML
4 INJECTION INTRAMUSCULAR; INTRAVENOUS EVERY 12 HOURS PRN
Status: DISCONTINUED | OUTPATIENT
Start: 2017-12-12 | End: 2017-12-13 | Stop reason: HOSPADM

## 2017-12-12 RX ORDER — PROMETHAZINE HYDROCHLORIDE 25 MG/1
25 TABLET ORAL EVERY 4 HOURS
Status: DISCONTINUED | OUTPATIENT
Start: 2017-12-12 | End: 2017-12-13 | Stop reason: HOSPADM

## 2017-12-12 RX ORDER — MEPERIDINE HYDROCHLORIDE 50 MG/ML
12.5 INJECTION INTRAMUSCULAR; INTRAVENOUS; SUBCUTANEOUS ONCE AS NEEDED
Status: DISCONTINUED | OUTPATIENT
Start: 2017-12-12 | End: 2017-12-12 | Stop reason: HOSPADM

## 2017-12-12 RX ORDER — CEFAZOLIN SODIUM 2 G/50ML
2 SOLUTION INTRAVENOUS
Status: COMPLETED | OUTPATIENT
Start: 2017-12-12 | End: 2017-12-13

## 2017-12-12 RX ORDER — EPINEPHRINE 1 MG/ML
INJECTION, SOLUTION, CONCENTRATE INTRAVENOUS
Status: DISCONTINUED | OUTPATIENT
Start: 2017-12-12 | End: 2017-12-12 | Stop reason: HOSPADM

## 2017-12-12 RX ORDER — LISINOPRIL 2.5 MG/1
5 TABLET ORAL DAILY
Status: DISCONTINUED | OUTPATIENT
Start: 2017-12-12 | End: 2017-12-12

## 2017-12-12 RX ORDER — GLYCOPYRROLATE 0.2 MG/ML
INJECTION INTRAMUSCULAR; INTRAVENOUS
Status: DISCONTINUED | OUTPATIENT
Start: 2017-12-12 | End: 2017-12-12

## 2017-12-12 RX ORDER — OXYCODONE HYDROCHLORIDE 5 MG/1
5 TABLET ORAL
Status: DISCONTINUED | OUTPATIENT
Start: 2017-12-12 | End: 2017-12-12 | Stop reason: SDUPTHER

## 2017-12-12 RX ADMIN — HYDROMORPHONE HYDROCHLORIDE 0.4 MG: 2 INJECTION INTRAMUSCULAR; INTRAVENOUS; SUBCUTANEOUS at 12:12

## 2017-12-12 RX ADMIN — ROPIVACAINE HYDROCHLORIDE 30 ML: 5 INJECTION, SOLUTION EPIDURAL; INFILTRATION; PERINEURAL at 07:12

## 2017-12-12 RX ADMIN — SODIUM CHLORIDE, SODIUM LACTATE, POTASSIUM CHLORIDE, AND CALCIUM CHLORIDE: 600; 310; 30; 20 INJECTION, SOLUTION INTRAVENOUS at 06:12

## 2017-12-12 RX ADMIN — PROMETHAZINE HYDROCHLORIDE 25 MG: 25 TABLET ORAL at 09:12

## 2017-12-12 RX ADMIN — PROPOFOL 50 MG: 10 INJECTION, EMULSION INTRAVENOUS at 08:12

## 2017-12-12 RX ADMIN — MIDAZOLAM HYDROCHLORIDE 4 MG: 5 INJECTION, SOLUTION INTRAMUSCULAR; INTRAVENOUS at 05:12

## 2017-12-12 RX ADMIN — VANCOMYCIN HYDROCHLORIDE 1500 MG: 1 INJECTION, POWDER, LYOPHILIZED, FOR SOLUTION INTRAVENOUS at 08:12

## 2017-12-12 RX ADMIN — CEFAZOLIN SODIUM 2 G: 2 SOLUTION INTRAVENOUS at 08:12

## 2017-12-12 RX ADMIN — PHENYLEPHRINE HYDROCHLORIDE 100 MCG: 10 INJECTION INTRAVENOUS at 10:12

## 2017-12-12 RX ADMIN — MUPIROCIN: 20 OINTMENT TOPICAL at 05:12

## 2017-12-12 RX ADMIN — NEOSTIGMINE METHYLSULFATE 5 MG: 1 INJECTION INTRAVENOUS at 10:12

## 2017-12-12 RX ADMIN — ACETAMINOPHEN 1000 MG: 10 INJECTION, SOLUTION INTRAVENOUS at 08:12

## 2017-12-12 RX ADMIN — HYDROMORPHONE HYDROCHLORIDE 0.4 MG: 2 INJECTION INTRAMUSCULAR; INTRAVENOUS; SUBCUTANEOUS at 11:12

## 2017-12-12 RX ADMIN — CEFAZOLIN SODIUM 2 G: 2 SOLUTION INTRAVENOUS at 03:12

## 2017-12-12 RX ADMIN — ROCURONIUM BROMIDE 10 MG: 10 INJECTION, SOLUTION INTRAVENOUS at 08:12

## 2017-12-12 RX ADMIN — FENTANYL CITRATE 50 MCG: 50 INJECTION, SOLUTION INTRAMUSCULAR; INTRAVENOUS at 07:12

## 2017-12-12 RX ADMIN — LIDOCAINE HYDROCHLORIDE 75 MG: 20 INJECTION, SOLUTION INTRAVENOUS at 07:12

## 2017-12-12 RX ADMIN — FENTANYL CITRATE 100 MCG: 50 INJECTION, SOLUTION INTRAMUSCULAR; INTRAVENOUS at 06:12

## 2017-12-12 RX ADMIN — CEFAZOLIN SODIUM 2 G: 2 SOLUTION INTRAVENOUS at 11:12

## 2017-12-12 RX ADMIN — PROPOFOL 50 MG: 10 INJECTION, EMULSION INTRAVENOUS at 09:12

## 2017-12-12 RX ADMIN — PROPOFOL 150 MG: 10 INJECTION, EMULSION INTRAVENOUS at 07:12

## 2017-12-12 RX ADMIN — DOCUSATE SODIUM 100 MG: 100 CAPSULE, LIQUID FILLED ORAL at 09:12

## 2017-12-12 RX ADMIN — PHENYLEPHRINE HYDROCHLORIDE 200 MCG: 10 INJECTION INTRAVENOUS at 10:12

## 2017-12-12 RX ADMIN — MIDAZOLAM HYDROCHLORIDE 2 MG: 1 INJECTION, SOLUTION INTRAMUSCULAR; INTRAVENOUS at 06:12

## 2017-12-12 RX ADMIN — GLYCOPYRROLATE 0.8 MG: 0.2 INJECTION, SOLUTION INTRAMUSCULAR; INTRAVENOUS at 10:12

## 2017-12-12 RX ADMIN — FAMOTIDINE 20 MG: 20 TABLET, FILM COATED ORAL at 05:12

## 2017-12-12 RX ADMIN — ROCURONIUM BROMIDE 40 MG: 10 INJECTION, SOLUTION INTRAVENOUS at 07:12

## 2017-12-12 RX ADMIN — FENTANYL CITRATE 50 MCG: 50 INJECTION, SOLUTION INTRAMUSCULAR; INTRAVENOUS at 08:12

## 2017-12-12 RX ADMIN — MUPIROCIN 1 G: 20 OINTMENT TOPICAL at 09:12

## 2017-12-12 RX ADMIN — FENTANYL CITRATE 50 MCG: 50 INJECTION, SOLUTION INTRAMUSCULAR; INTRAVENOUS at 09:12

## 2017-12-12 RX ADMIN — DICLOFENAC SODIUM 75 MG: 75 TABLET, DELAYED RELEASE ORAL at 09:12

## 2017-12-12 RX ADMIN — DEXAMETHASONE SODIUM PHOSPHATE 8 MG: 4 INJECTION, SOLUTION INTRAMUSCULAR; INTRAVENOUS at 07:12

## 2017-12-12 RX ADMIN — ONDANSETRON 4 MG: 2 INJECTION INTRAMUSCULAR; INTRAVENOUS at 10:12

## 2017-12-12 RX ADMIN — OXYCODONE HYDROCHLORIDE 10 MG: 10 TABLET, FILM COATED, EXTENDED RELEASE ORAL at 09:12

## 2017-12-12 RX ADMIN — HYDROCODONE BITARTRATE AND ACETAMINOPHEN 1 TABLET: 10; 325 TABLET ORAL at 03:12

## 2017-12-12 RX ADMIN — SODIUM CHLORIDE, SODIUM LACTATE, POTASSIUM CHLORIDE, AND CALCIUM CHLORIDE: 600; 310; 30; 20 INJECTION, SOLUTION INTRAVENOUS at 08:12

## 2017-12-12 RX ADMIN — PHENYLEPHRINE HYDROCHLORIDE 100 MCG: 10 INJECTION INTRAVENOUS at 08:12

## 2017-12-12 RX ADMIN — TRANEXAMIC ACID 1000 MG: 100 INJECTION, SOLUTION INTRAVENOUS at 07:12

## 2017-12-12 RX ADMIN — OXYCODONE HYDROCHLORIDE 5 MG: 5 TABLET ORAL at 12:12

## 2017-12-12 NOTE — ANESTHESIA PROCEDURE NOTES
Peripheral    Patient location during procedure: pre-op   Block not for primary anesthetic.  Reason for block: at surgeon's request and post-op pain management   Post-op Pain Location: right knee pain   Staffing  Anesthesiologist: OLY ARANA  Performed: anesthesiologist   Preanesthetic Checklist  Completed: patient identified, site marked, surgical consent, pre-op evaluation, timeout performed, IV checked, risks and benefits discussed and monitors and equipment checked  Peripheral Block  Patient position: supine  Prep: ChloraPrep  Patient monitoring: heart rate, cardiac monitor, continuous pulse ox, continuous capnometry and frequent blood pressure checks  Block type: adductor canal  Laterality: right  Injection technique: single shot  Needle  Needle type: Stimuplex   Needle gauge: 21 G  Needle length: 3.5 in  Needle localization: anatomical landmarks and ultrasound guidance   -ultrasound image captured on disc.  Assessment  Injection assessment: negative aspiration, negative parasthesia and local visualized surrounding nerve  Paresthesia pain: none  Heart rate change: no  Slow fractionated injection: yes  Medications:  Bolus administered: 30 mL of 0.5 ropivacaine  Additional Notes  VSS.  [facility] RN monitoring vitals throughout procedure.  Patient tolerated procedure well.

## 2017-12-12 NOTE — BRIEF OP NOTE
Operative Note       Surgery Date: 12/12/2017     Surgeon(s) and Role:     * Sri Roberts MD - Primary     * Jossue Irene MD     * Kayla MAN    Pre-op Diagnosis:  Painful orthopaedic hardware [T84.84XA]    Post-op Diagnosis:  same    Procedure(s) (LRB):  REVISION-ARTHROPLASTY-KNEE (Right)  BONE GRAFT; FEMORAL AUTOLOGOUS BONE GRAFTING (Right)    Anesthesia: General    Findings/Key Components:  As above    Core Measure Documentation:  Were antibiotics extended? No  Was the patient administered a VTE Prophylaxis? No. Short procedure; low risk  Estimated Blood Loss: minimal           Specimens     None        Implants:   Implant Name Type Inv. Item Serial No.  Lot No. LRB No. Used   PIN TEMPORARY - [id number]  PIN TEMPORARY  [employer] [id number] Right 1   PIN THREADED STERILE - [id number]  PIN THREADED STERILE  [employer] [id number] Right 1   CEMENT BONE IMPLANT - [id number]  CEMENT BONE IMPLANT  [employer] [id number] Right 2   SLEEVE TIBIAL MED/LG 29MM - [id number]  SLEEVE TIBIAL MED/LG 29MM  [employer] [id number] Right 1   TIBIAL TRAY     [employer] [id number] Right 1   SIGMA TIBIAL INSERT    [employer] [id number] Right 1   STEM UNIVERSAL FLUTED 01R87YI - [id number]   STEM UNIVERSAL FLUTED [id number]   [employer] [id number] Right 1       Complications: none           Disposition: PACU - hemodynamically stable.           Condition: Stable    Attestation:  I was present for the entire procedure.

## 2017-12-12 NOTE — DISCHARGE INSTRUCTIONS
[address], [place]                                                                                          [phone]                   Postoperative Instructions for Knee Surgery                 Your Surgery Included:   Open               Arthroscopic   [] Ligament Repair       [] Diagnostic           [] ACL     [] PCL     [] MCL     [] PLLC      [] Synovectomy / Plica Removal [] Meniscal Cartilage Repair / Transplantation      [] Lysis of Adhesions / Manipulation [] Articular Cartilage Repair      [] Interval Release           [] Microfracture       [] OATS   [] ACI      [] Meniscectomy           [] Osteochondral Allograft      [] Meniscal Cartilage Repair  [] Patellar Realignment       [] Debridement / Chondroplasty         [] Lateral Release   [] Ligament Repair      [] Articular Cartilage Repair          [] Extensor Mechanism             []   Microfracture  []  OATS         []  Cartilage Biopsy [] Tendon Repair          [] Ligament Reconstruction          [] Patella                  [] Quadriceps             []   ACL    []   PCL  [] High Tibial Osteotomy       [] PRP Arthrocentesis  [] Joint Replacement         [] Amniox Arthrocentesis           [] Unicompartmental   [] Patellofemoral                    [x] Revision Total Knee                  Call our office ([phone]) immediately if you experience any of the following:       Excessive bleeding or pus like drainage at the incision site       Uncontrollable pain not relieved by pain medication       Excessive swelling or redness at the incision site       Fever above 101.5 degrees not controlled with Tylenol or Motrin       Shortness of Breath       Any foul odor or blistering from the surgery site    FOR EMERGENCIES: If any unusual problems or difficulties occur, call our office at [phone], or page the  at [phone] who will direct your call appropriately    1.   Pain Management: A cold  therapy cuff, pain medications, local injections, and in some cases, regional anesthesia injections are used to manage your post-operative pain. The decision to use each of these options is based on their risks and benefits.     Medications: You were given one or more of the following medication prescriptions before leaving the hospital. Have the prescriptions filled at a pharmacy on your way home and follow the instructions on the bottles. If you need a refill, please call your pharmacy.      Narcotic Medication (usually Vicodin ES, Lortab, Percocet or Nucynta): Begin taking the medication before your knee starts to hurt. Some patients do not like to take any medication, but if you wait until your pain is severe before taking, you will be very uncomfortable for several hours waiting for the narcotic to work. Always take with food.     Nausea / Vomiting: For this issue, we prescribe Phenergan, use this medication as directed.     Cold Therapy: You may have been sent home with a [facility] cold therapy unit and wrap for your knee. Fill with ice and water to the indicated fill line and use throughout the day for the first two days and then as needed to help relieve pain and control swelling.      Regional Anesthesia Injections (Blocks): You may have been given a regional nerve block either before or after surgery. This may make your entire leg numb for 24-36 hours.                            * Proceed with caution when bearing weight on your leg.     2.   Diet: Eat a bland diet for the first day after surgery. Progress your diet as tolerated. Constipation may occur with Narcotic usage, contact our office if you are experiencing constipation.    3.   Activity: Limit your activity during the first 48 hours, keep your leg elevated with pillows under your heel. After the first 48 hours at home, increase your activity level based on your symptoms.    4.   Dressing Change: Remove the dressing on the 3rd day. It is normal  "for some blood to be seen on the dressings. It is also normal for you to see apparent bruising on the skin around your knee when you remove the dressing. If present, leave the steri-strip tape across the incisions. If you are concerned by the drainage or the appearance of your knee, please call one of the numbers listed below.    5.   Showering: You may shower on the 3rd day after surgery with waterproof bandages over small incisions. If you have an incision with Prineo (clear mesh), it does not need to be covered. Do not submerge in any water until after your postoperative appointment in clinic.    6.   Your procedure did not require a post-operative brace.    7.   Your procedure did not require a Continuous Passive Motion (CPM) device.    8.   Weight Bearing: You may have been sent home with crutches. If instructed (see below), use these crutches at all times unless at complete rest.      [] Non-weight bearing for     0 weeks (you may touch your toes to the floor)      [] Partial weight bearing for  0 weeks    [] 25% Body Weight   [] 50% Body Weight      [x] Full weight bearing            [x]  NOW    []  after 0 weeks     9.  Knee Exercises: Begin these exercises the first day after surgery in order to help you regain your motion and strength. You may do the following marked exercises:     [x] Quad Sets - Begin activating your quadriceps muscle by driving your          knee downward into full knee extension while seated on a table or bed   with a towel rolled and propped under your heel     [x] Straight Leg Raise (SLR) - While erwin your quadriceps muscle, lift     your fully extended leg to the level of your non-operative knee (as shown)     [x] Heel Slides - With the knee straight, slide your heel slowly toward your   buttocks, hold at the endpoint for 10-15 seconds, then slowly straighten     [x] Ankle pumps - With your knee straight, move your ankle in a "pumping"    fashion to activate your calf and leg " muscles      10.  Physical Therapy: Physical therapy is an essential component to your recovery from surgery. Your physical therapy will start in 3 days.    FIRST POSTOPERATIVE VISIT: As scheduled.

## 2017-12-12 NOTE — PLAN OF CARE
[facility]    HOME HEALTH ORDERS  FACE TO FACE ENCOUNTER    Patient Name: Carleen Kincaid  YOB: 1960    PCP: Francis Rodriguez MD   PCP Address: [address] / [place] [address]  PCP Phone Number: [phone]  PCP Fax: [phone]    Encounter Date: 12/12/2017    Admit to Home Health    Diagnoses:  Active Hospital Problems    Diagnosis  POA    Chronic pain of right knee [M25.561, G89.29]  Yes      Resolved Hospital Problems    Diagnosis Date Resolved POA   No resolved problems to display.       Future Appointments  Date Time Provider Department Center   12/20/2017 12:30 PM Sri Roberts MD [facility] [place]           I have seen and examined this patient face to face today. My clinical findings that support the need for the home health skilled services and home bound status are the following:  Requiring assistive device to leave home due to unsteady gait caused by revision joint replacement    Allergies:Review of patient's allergies indicates:  No Known Allergies    Diet: Regular    Activities: As tolerated    Nursing:   SN to complete comprehensive assessment including routine vital signs. Instruct on disease process and s/s of complications to report to MD. Review/verify medication list sent home with the patient at time of discharge  and instruct patient/caregiver as needed. Frequency may be adjusted depending on start of care date.    Notify MD if SBP > 160 or < 90; DBP > 90 or < 50; HR > 120 or < 50; Temp > 101;       CONSULTS:    PT/OT 3x/week x 2 weeks      WOUND CARE ORDERS  Remove dressing on POD#3 then keep wound dry & covered until seen in clinic. Keep clear mesh prineo tape over incision if present.      Medications: Review discharge medications with patient and family and provide education.      Current Discharge Medication List      CONTINUE these medications which have NOT CHANGED    Details   cyclobenzaprine (FLEXERIL) 5 MG tablet Take by mouth daily as  needed for Muscle spasms. Not sure of dosage      diclofenac (VOLTAREN) 75 MG EC tablet Take 1 tablet (75 mg total) by mouth 2 (two) times daily.  Qty: 60 tablet, Refills: 0      lisinopril (PRINIVIL,ZESTRIL) 5 MG tablet Take 5 mg by mouth once daily.      mupirocin (BACTROBAN) 2 % ointment AAA bid  Qty: 30 g, Refills: 2    Associated Diagnoses: Furuncle      traMADol (ULTRAM) 50 mg tablet Take 1 tablet (50 mg total) by mouth every 6 (six) hours as needed for Pain.  Qty: 40 tablet, Refills: 0      adalimumab (HUMIRA) 40 mg/0.8 mL injection Inject 0.8 mLs (40 mg total) into the skin every 14 (fourteen) days.  Qty: 2 vial, Refills: 6    Associated Diagnoses: Psoriasis; Encounter for long-term (current) use of other medications      aspirin (ECOTRIN) 325 MG EC tablet Take 1 tablet (325 mg total) by mouth once daily.  Refills: 0      calcipotriene-betamethasone (ENSTILAR) 0.005-0.064 % Foam Apply 1 application topically once daily.  Qty: 60 g, Refills: 5    Associated Diagnoses: Psoriasis vulgaris      calcipotriene-betamethasone (TACLONEX SCALP) external suspension Apply topically once daily.  Qty: 60 g, Refills: 1    Associated Diagnoses: Psoriasis; Encounter for long-term (current) use of other medications      CLODAN 0.05 % shampoo Apply to dry scalp 15 min prior to washing/rinsing qod  Qty: 1 Bottle, Refills: 3    Associated Diagnoses: Psoriasis vulgaris      diclofenac sodium 1 % Gel Apply 2 g topically 4 (four) times daily.      diclofenac sodium 1.5 % Drop       diphenhydramine-acetaminophen (TYLENOL PM)  mg Tab Take 1 tablet by mouth once daily.      ergocalciferol (ERGOCALCIFEROL) 50,000 unit Cap Refills: 1      hydrocodone-acetaminophen 10-325mg (NORCO)  mg Tab Take 1 tablet by mouth every 6 (six) hours as needed for Pain.  Qty: 60 tablet, Refills: 0      nitrofurantoin, macrocrystal-monohydrate, (MACROBID) 100 MG capsule       pitavastatin (LIVALO) 4 mg Tab Take by mouth as needed.       promethazine (PHENERGAN) 25 MG tablet Take 1 tablet (25 mg total) by mouth every 4 (four) hours.  Qty: 60 tablet, Refills: 0             I certify that this patient is confined to her home and needs PT/OT for gait training and help with ambulation

## 2017-12-12 NOTE — PLAN OF CARE
Problem: Physical Therapy Goal  Goal: Physical Therapy Goal  Goals to be met by: 17     Patient will increase functional independence with mobility by performin. Supine to sit with supervision.   2. Sit to supine with supervision.   3. Sit<>stand transfer with supervision using rolling walker.   4. Gait > 150 feet with SBA using rolling walker.   5. Ascend/descend curb step with CGA with rolling walker    Outcome: Ongoing (interventions implemented as appropriate)  PT evaluation completed. Activity tolerance limited by drowsiness first session today. Pt requires min A to BSC and up in chair at end session.     BID session: Significant improvement likely due to increased alertness. Gait > 200 ft with walker with minor LOB x 1 requiring min A. Will continue to follow and progress as tolerated. Please see progress note for detailed plan of care and recommendations.

## 2017-12-12 NOTE — H&P (VIEW-ONLY)
Carleen Kincaid  is here for a completion of her perioperative paperwork. she  Is scheduled to undergo     1. Right knee revision total knee arthroplasty (TC3)  2. Right knee femoral autologous bone grafting     on 12/12/17.  She is a healthy individual and does need clearance for this procedure.     Risks, indications and benefits of the surgical procedure were discussed with the patient. All questions with regard to surgery, rehab, expected return to functional activities, activities of daily living and recreational endeavors were answered to her satisfaction.    Once no other questions were asked, a brief history and physical exam was then performed.      PHYSICAL EXAM:  GEN: A&Ox3, WD WN NAD  HEENT: WNL  CHEST: CTAB, no W/R/R  HEART: RRR, no M/R/G  ABD: Soft, NT ND, BS x4 QUADS  MS; See Epic  NEURO: CN II-XII intact       The surgical consent was then reviewed with the patient, who agreed with all the contents of the consent form and it was signed. she was then given the [facility] surgery packet to bring with her to [facility] for the anesthesia portion of her perioperative paperwork.     PHYSICAL THERAPY:  She was also instructed regarding physical therapy and will begin on  POD#3. She was given a copy of the original prescription to schedule. Another copy of this prescription was also faxed to her therapist.    POST OP CARE:instructions were reviewed including care of the wound and dressing after surgery and when she can shower.     PAIN MANAGEMENT: Carleen Kincaid was also given Norco which works better for her, diclofenac which works better for her, and Tramadol for breakthrough pain pain management regime, which includes the TENS unit given to her by Adelso Ballard along with the education required for its use. She was also instructed regarding the Polar ice unit that will be in place after surgery and her postoperative pain medications.     PAIN MEDICATION:  Norco 10/325mg 1 po q 4-6 hours prn pain  Ultram 50 mg  one p.o. q.4-6 hours p.r.n. breakthrough pain,   Phenergan 25 mg one p.o. q.4-6 hours p.r.n. nausea and vomiting.  Diclofenac EC 75 mg PO BID   mg PO daily for dvt ppx    As there were no other questions to be asked, she was given my business card along with Sri Roberts MD business card if she has any questions or concerns prior to surgery or in the postop period.

## 2017-12-12 NOTE — TRANSFER OF CARE
"Anesthesia Transfer of Care Note    Patient: Carleen Kincaid    Procedure(s) Performed: Procedure(s) (LRB):  REVISION-ARTHROPLASTY-KNEE (Right)  BONE GRAFT; FEMORAL AUTOLOGOUS BONE GRAFTING (Right)    Patient location: PACU    Anesthesia Type: general    Transport from OR: Transported from OR on 2-3 L/min O2 by NC with adequate spontaneous ventilation    Post pain: adequate analgesia    Post assessment: no apparent anesthetic complications    Post vital signs: stable    Level of consciousness: awake, alert and oriented    Nausea/Vomiting: no nausea/vomiting    Complications: none    Transfer of care protocol was followed      Last vitals:   Visit Vitals  BP (!) 147/92 (BP Location: Left arm, Patient Position: Lying)   Pulse 82   Temp 36.8 °C (98.2 °F) (Oral)   Resp 16   Ht 5' 1" (1.549 m)   Wt 74.8 kg (165 lb)   SpO2 99%   Breastfeeding? No   BMI 31.18 kg/m²     "

## 2017-12-12 NOTE — OR NURSING
Very restless upon arrival to pacu.  Moaning, and moving in bed.  C/o rt leg pain.  Very confused upon arrival ..

## 2017-12-12 NOTE — ANESTHESIA POSTPROCEDURE EVALUATION
"Anesthesia Post Evaluation    Patient: Carleen Kincaid    Procedure(s) Performed: Procedure(s) (LRB):  REVISION-ARTHROPLASTY-KNEE (Right)  BONE GRAFT; FEMORAL AUTOLOGOUS BONE GRAFTING (Right)    Final Anesthesia Type: general  Patient location during evaluation: PACU  Patient participation: Yes- Able to Participate  Level of consciousness: awake and alert  Post-procedure vital signs: reviewed and stable  Pain management: adequate  Airway patency: patent  PONV status at discharge: No PONV  Anesthetic complications: no      Cardiovascular status: hemodynamically stable  Respiratory status: unassisted and spontaneous ventilation  Hydration status: euvolemic  Follow-up not needed.        Visit Vitals  BP (!) 99/59 (BP Location: Right arm, Patient Position: Lying)   Pulse 98   Temp 36.7 °C (98 °F) (Oral)   Resp 18   Ht 5' 1" (1.549 m)   Wt 82.1 kg (181 lb)   SpO2 96%   Breastfeeding? No   BMI 34.20 kg/m²       Pain/Wallace Score: Pain Assessment Performed: Yes (12/12/2017  2:31 PM)  Presence of Pain: complains of pain/discomfort (12/12/2017  2:31 PM)  Pain Rating Prior to Med Admin: 6 (12/12/2017 12:43 PM)  Pain Rating Post Med Admin: 5 (12/12/2017  1:08 PM)  Wallace Score: 9 (12/12/2017  1:55 PM)      "

## 2017-12-12 NOTE — PT/OT/SLP PROGRESS
"Physical Therapy Treatment    Patient Name:  Carleen Kincaid   MRN:  [id number]    Recommendations:     Discharge Recommendations:  home with home health, home health PT, home health OT   Discharge Equipment Recommendations: 3-in-1 commode, walker, rolling   Barriers to discharge: None    Assessment:     Carleen Kincaid is a 57 y.o. female admitted with a medical diagnosis of <principal problem not specified>.  She presents with the following impairments/functional limitations:  weakness, impaired self care skills, impaired functional mobilty, gait instability, impaired cognition, decreased safety awareness, decreased lower extremity function, decreased ROM, edema, pain (impaired alertness). BID session: Significant improvement likely due to increased alertness. Gait > 200 ft with walker with minor LOB x 1 requiring min A. Pt would benefit from continued skilled PT to maximize independence and safety with functional mobility.      Rehab Prognosis:  Good; patient would benefit from acute skilled PT services to address these deficits and reach maximum level of function.      Recent Surgery: Procedure(s) (LRB):  REVISION-ARTHROPLASTY-KNEE (Right)  BONE GRAFT; FEMORAL AUTOLOGOUS BONE GRAFTING (Right) Day of Surgery    Plan:     During this hospitalization, patient to be seen BID to address the above listed problems via gait training, therapeutic activities, therapeutic exercises, neuromuscular re-education  · Plan of Care Expires:  01/11/18   Plan of Care Reviewed with: patient, spouse    Subjective     Communicated with nurse prior to session.  Patient found reclined in chair with spouse present upon PT entry to room, agreeable to treatment.      Chief Complaint: pain, urinary urgency  Patient comments/goals: "How long will it take for my bladder to wake up?"  Pain/Comfort:  · Pain Rating 1: 2/10  · Location - Side 1: Right  · Location 1: knee  · Pain Addressed 1: Reposition, Cessation of Activity  · Pain Rating " Post-Intervention 1: 2/10    Patients cultural, spiritual, Samaritan conflicts given the current situation: none specified    Objective:     Patient found with: peripheral IV     General Precautions: Standard,  (fall risk)   Orthopedic Precautions:RLE weight bearing as tolerated   Braces: N/A     Functional Mobility:  · Bed Mobility:     · Sit to Supine: contact guard assistance and bed flat, verbal cues for technique  · Transfers:     · Sit to Stand:  contact guard assistance and x 2 trials (from bed and from toilet with grab bar) wither verbal cues for technique and alignment with rolling walker  · Gait: x 240 ft on level tile; noted R medial heel whip during R toe off, decreased R knee flexion during R swing and slight circumduction during R swing. Verbal cues to correct for gait deviations      AM-PAC 6 CLICK MOBILITY  Turning over in bed (including adjusting bedclothes, sheets and blankets)?: 3  Sitting down on and standing up from a chair with arms (e.g., wheelchair, bedside commode, etc.): 3  Moving from lying on back to sitting on the side of the bed?: 3  Moving to and from a bed to a chair (including a wheelchair)?: 3  Need to walk in hospital room?: 2  Climbing 3-5 steps with a railing?: 3  Total Score: 15       Therapeutic Activities and Exercises:   Pt required supervision for seated pericare after voiding on toilet, CGA for hand hygiene at sink.   Spouse chair follow during gait.  Pt performed supine therapeutic exercises including bilateral ankle pumps, R quad sets, bilateral glute sets x 15 reps with verbal and tactile cues.       Patient left supine with all lines intact, call button in reach, bed alarm on, nurse notified and spouse present..    GOALS:    Physical Therapy Goals        Problem: Physical Therapy Goal    Goal Priority Disciplines Outcome Goal Variances Interventions   Physical Therapy Goal     PT/OT, PT Ongoing (interventions implemented as appropriate)     Description:  Goals to be  met by: 17     Patient will increase functional independence with mobility by performin. Supine to sit with supervision.   2. Sit to supine with supervision.   3. Sit<>stand transfer with supervision using rolling walker.   4. Gait > 150 feet with SBA using rolling walker.   5. Ascend/descend curb step with CGA with rolling walker                      Time Tracking:     PT Received On: 17  PT Start Time: 1714     PT Stop Time: 1753  PT Total Time (min): 39 min     Billable Minutes: Gait Training 15, Therapeutic Activity 15 and Therapeutic Exercise 9    Treatment Type: Treatment  PT/PTA: PT     PTA Visit Number: 0     Trudy Martinez, PT  2017

## 2017-12-12 NOTE — PLAN OF CARE
Met with patient &  at bedside to complete discharge assessment. Patient is to receive home health therapy temporarily after DC then progress to an outpatient gym. Patient's preference is [facility] in [place]. Will send referral via [facility]. Patient ia requesting a rolling walker & 3 in 1 commode to use at home. Approval given per Bear at [facility] to pull from our supply. Will deliver to patient;s room prior to DC      12/12/17 1502   Discharge Assessment   Assessment Type Discharge Planning Assessment   Confirmed/corrected address and phone number on facesheet? Yes   Assessment information obtained from? Patient;Caregiver   Expected Length of Stay (days) 1   Communicated expected length of stay with patient/caregiver yes   Prior to hospitilization cognitive status: Alert/Oriented   Prior to hospitalization functional status: Independent   Current cognitive status: Alert/Oriented   Current Functional Status: Assistive Equipment;Needs Assistance   Lives With spouse   Able to Return to Prior Arrangements yes   Is patient able to care for self after discharge? Yes   Patient's perception of discharge disposition home health   Readmission Within The Last 30 Days no previous admission in last 30 days   Patient currently being followed by outpatient case management? No   Patient currently receives any other outside agency services? No   Equipment Currently Used at Home rollator   Do you have any problems affording any of your prescribed medications? No   Is the patient taking medications as prescribed? yes   Does the patient have transportation home? Yes   Transportation Available family or friend will provide   Does the patient receive services at the [facility]? No   Discharge Plan A Home Health   Patient/Family In Agreement With Plan yes

## 2017-12-12 NOTE — PT/OT/SLP EVAL
Physical Therapy Evaluation and Treatment    Patient Name:  Carleen Kincaid   MRN:  [id number]    Recommendations:     Discharge Recommendations:  home with home health, home health PT, home health OT   Discharge Equipment Recommendations: 3-in-1 commode, walker, rolling   Barriers to discharge: none with improved alertness (pt currently very drowsy post op)    Assessment:     Carleen Kincaid is a 57 y.o. female admitted with a medical diagnosis of <principal problem not specified>.  She presents with the following impairments/functional limitations:  weakness, impaired self care skills, impaired functional mobilty, gait instability, impaired cognition, decreased safety awareness, decreased lower extremity function, decreased ROM, edema, pain (impaired alertness). PT evaluation completed. Activity tolerance limited by drowsiness first session today. Pt requires min A to BSC and up in chair at end session. Will continue to follow and progress as tolerated. Pt has decreased independence with functional mobility presenting with below impairments. Pt would benefit from continued skilled PT to maximize independence and safety with functional mobility.    Rehab Prognosis:  Good; patient would benefit from acute skilled PT services to address these deficits and reach maximum level of function.      Recent Surgery: Procedure(s) (LRB):  REVISION-ARTHROPLASTY-KNEE (Right)  BONE GRAFT; FEMORAL AUTOLOGOUS BONE GRAFTING (Right) Day of Surgery    Plan:     During this hospitalization, patient to be seen BID to address the above listed problems via gait training, therapeutic activities, therapeutic exercises, neuromuscular re-education  · Plan of Care Expires:  01/11/18   Plan of Care Reviewed with: patient, spouse    Subjective     Communicated with nurse prior to session.  Patient found supine in bed with spouse present upon PT entry to room, agreeable to evaluation.      Chief Complaint: pain, drowsiness, urinary  urgency  Patient comments/goals: void  Pain/Comfort:  · Pain Rating 1: 3/10  · Location - Side 1: Right  · Location 1: knee  · Pain Addressed 1: Pre-medicate for activity, Cessation of Activity  · Pain Rating Post-Intervention 1: 3/10    Patients cultural, spiritual, Voodoo conflicts given the current situation: none specified    Living Environment:  Pt lives alone in 1 story house with 0 steps to enter. She has a walk-in shower with a built in seat and grab bar and a standard height toilet.   Prior to admission, patients level of function was modified independent using rollator for ambulation.  Patient has the following equipment: rollator.  DME owned (not currently used): none.  Upon discharge, patient will have assistance from spouse.    Objective:     Patient found with: peripheral IV     General Precautions: Standard,  (fall risk)   Orthopedic Precautions:RLE weight bearing as tolerated   Braces: N/A     Exams:  · Cognition: Patient is oriented to Person, Place, Time and Situation and follows approximately 75% of one step commands.  Impaired alertness, verbal cues for eyes open throughout session.  · Posture:    · -       Rounded shoulders  · -       Forward head  · -       R LE protective guarding  · Sensation: Intact to light touch bilateral LEs distally (not tested over R knee with dressing)  · Skin Integrity: R dressing clean and dry with hemovac in place  · Edema: Mild R LE  · Coordination: No coordination impairments identified with functional mobility. No formal testing performed.   · LE ROM/Strength: 3-/5 R knee extension; 5/5 L LE grossly  · Tone: No tone impairments identified during functional mobility.   · Vital signs: SpO2: 97% on room air; Heart rate 105 bpm with activity      Functional Mobility:  · Bed Mobility:     · Supine to Sit: contact guard assistance  · Transfers:     · Sit to Stand:  contact guard assistance with rolling walker and verbal cues for technique x 1 trial from bed, x 1  trial from elevated BSC  · Gait: x 2 ft to BSC for voiding, x 3 ft to bedside chair; verbal cues for sequencing, CGA at trunk, min A intermittently at walker for management  · Balance: CGA with bilateral UE support    AM-PAC 6 CLICK MOBILITY  Total Score:15     Therapeutic Activities and Exercises:  Pt performed sitting pericare after voiding with SBA.   Discussed PT plan of care, safety with OOB mobility, use of walker, transfer technique, WB status.    Patient left up in chair with all lines intact, call button in reach, nurse notified and spouse present.    GOALS:    Physical Therapy Goals        Problem: Physical Therapy Goal    Goal Priority Disciplines Outcome Goal Variances Interventions   Physical Therapy Goal     PT/OT, PT Ongoing (interventions implemented as appropriate)     Description:  Goals to be met by: 17     Patient will increase functional independence with mobility by performin. Supine to sit with supervision.   2. Sit to supine with supervision.   3. Sit<>stand transfer with supervision using rolling walker.   4. Gait > 150 feet with SBA using rolling walker.   5. Ascend/descend curb step with CGA with rolling walker                      History:     Past Medical History:   Diagnosis Date    Anemia     Degenerative disc disease, cervical     General anesthetics causing adverse effect in therapeutic use     NO DEMEROL    Hyperlipidemia     Hypertension     Kidney stones     Psoriasis     Stroke     retinal stroke       Past Surgical History:   Procedure Laterality Date    APPENDECTOMY      BACK SURGERY      lumbar spine    HYSTERECTOMY      partial    JOINT REPLACEMENT Bilateral     Knees    KNEE ARTHROSCOPY Right 2015    Dr Roberts    lithtotris      LUMBAR SPINE SURGERY  10/2014    L4-L5 fusion       Clinical Decision Making:     History  Co-morbidities and personal factors that may impact the plan of care Examination  Body Structures and Functions, activity  limitations and participation restrictions that may impact the plan of care Clinical Presentation   Decision Making/ Complexity Score   Co-morbidities:   [] Time since onset of injury / illness / exacerbation  [] Status of current condition  []Patient's cognitive status and safety concerns    [] Multiple Medical Problems (see med hx)  Personal Factors:   [] Patient's age  [] Prior Level of function   [] Patient's home situation (environment and family support)  [] Patient's level of motivation  [] Expected progression of patient      HISTORY:(criteria)    [] 50152 - no personal factors/history    [] 70916 - has 1-2 personal factor/comorbidity     [] 28463 - has >3 personal factor/comorbidity     Body Regions:  [] Objective examination findings  [] Head     []  Neck  [] Trunk   [] Upper Extremity  [] Lower Extremity    Body Systems:  [] For communication ability, affect, cognition, language, and learning style: the assessment of the ability to make needs known, consciousness, orientation (person, place, and time), expected emotional /behavioral responses, and learning preferences (eg, learning barriers, education  needs)  [] For the neuromuscular system: a general assessment of gross coordinated movement (eg, balance, gait, locomotion, transfers, and transitions) and motor function  (motor control and motor learning)  [] For the musculoskeletal system: the assessment of gross symmetry, gross range of motion, gross strength, height, and weight  [] For the integumentary system: the assessment of pliability(texture), presence of scar formation, skin color, and skin integrity  [] For cardiovascular/pulmonary system: the assessment of heart rate, respiratory rate, blood pressure, and edema     Activity limitations:    [] Patient's cognitive status and saf ety concerns          [] Status of current condition      [] Weight bearing restriction  [] Cardiopulmunary Restriction    Participation Restrictions:   [] Goals and  goal agreement with the patient     [] Rehab potential (prognosis) and probable outcome      Examination of Body System: (criteria)    [] 89686 - addressing 1-2 elements    [] 03217 - addressing a total of 3 or more elements     [] 16092 -  Addressing a total of 4 or more elements         Clinical Presentation: (criteria)  Choose one     On examination of body system using standardized tests and measures patient presents with (CHOOSE ONE) elements from any of the following: body structures and functions, activity limitations, and/or participation restrictions.  Leading to a clinical presentation that is considered (CHOOSE ONE)                              Clinical Decision Making  (Eval Complexity):  Choose One     Time Tracking:     PT Received On: 12/12/17  PT Start Time: 1450     PT Stop Time: 1517  PT Total Time (min): 27 min     Billable Minutes: Evaluation 15 and Therapeutic Activity 12      Trudy Martinez, PT  12/12/2017

## 2017-12-12 NOTE — INTERVAL H&P NOTE
The patient has been examined and the H&P has been reviewed:    I concur with the findings and no changes have occurred since H&P was written.    Anesthesia/Surgery risks, benefits and alternative options discussed and understood by patient/family.          Active Hospital Problems    Diagnosis  POA    Chronic pain of right knee [M25.561, G89.29]  Yes      Resolved Hospital Problems    Diagnosis Date Resolved POA   No resolved problems to display.

## 2017-12-12 NOTE — OP NOTE
DATE OF PROCEDURE:  12/12/2017    ATTENDING SURGEON:  Sri Roberts M.D.    CO-SURGEON:  Santiago Qiu M.D.    FIRST ASSISTANT:  SMA Sreekanth-assistant.    SECOND ASSISTANT:  Jossue Irene M.D.    PREOPERATIVE DIAGNOSIS:  Failed right total knee replacement.    POSTOPERATIVE DIAGNOSIS:  Failed right total knee replacement.    PROCEDURES PERFORMED:  Right complex revision total knee replacement.    ANESTHESIA:  Adductor block, concomitant general with LMA and local anesthetics   using 120 mL Exparel mixture.    FLUIDS IN THE CASE:  700 mL LR.    ESTIMATED BLOOD LOSS:  300 mL.    URINE OUTPUT:  Straight catheter postoperatively.    TOURNIQUET TIME:  22 minutes.    COMPLICATIONS:  None.    IMPLANTS UTILIZED:  SmartSet with gentamicin was utilized x2, [employer] cancellous chips (30 mL), tibial components: MBT revision   metaphyseal sleeve porous 28 mm, sigma tibial insert rotating platform TC3 size   2.5, 10 mm component, tibial tray rotating platform MBT revision size 3 cemented   component, universal stem fluted 75 x 14 mm tibial component, MBT revisions   step wedge size 3, 5 mm cemented component.  Femoral components:  PFC sigma   distal augment size 2.5, 8 mm right, PFC sigma distal augment size 2.5, 4 mm   right, universal stem fluted 75 x 16 mm stem, PFC sigma femoral adapter bolt +2,   PFC sigma femoral adapter 5 degree, PFC sigma posterior augment combo cemented   size 2.5, 4 mm, PFC Sigma posterior augment combo cemented size 2.5, 4 mm   universal femoral sleeve full porous 31 mm component, sigma femoral TC3 cemented   size 2.5 right component.    INDICATIONS FOR OPERATIVE PROCEDURE:  Carleen Kincaid is a 57-year-old female with   history of right knee pain and pathology.  She was noted to have significant   problems in the area of concern following previous ConforMIS implant placement,   she had significant loss of the tibial control with subsidence of the tibial    component and radiographic assessments demonstrating loosened component with   cystic formation along the proximal medial tibial region.  There are always   problems and problems with aggressive loads as well as the stability of the   knee, the patient was deemed to be an appropriate candidate for operative   intervention.    Complexity of case was increased based on the revision nature of procedure and   change in overall bony anatomy secondary to previous operative intervention.    Co-surgeon duties:  Co-surgeon duties were required secondary to the complex   nature of the revision procedure, change in overall bony anatomy and soft tissue   structures with a complex intraoperative releases and decision making required.    DESCRIPTION OF PROCEDURE:  The patient was brought into the Operating Room,   placed in supine position.  Upon application of adductor block in the   preoperative hold area, the patient underwent general sedation and placement of   LMA to stabilize the airway.  The patient was given the appropriate dose of   antibiotics based on body weight.  Time-out was utilized to verify right side as   the operative site.  Both upper extremities were placed in comfortable   position.  Left leg was carefully padded along the heel and peroneal nerve   regions.  Right leg was then prepped and draped in a sterile fashion with   ChloraPrep material with a bump under the right hip and lateral post for   intraoperative positioning as well as a popliteal post along the right side of   the table.  Ioban was placed over the knee.  Medial based incision was carried   down through the skin down the fat and fascia.  Flaps were then raised   superiorly, inferiorly, medially and laterally along the area of concern.  We   then exposed the medial portion of the knee and performed a medial subvastus   approach to the knee.  We performed significant scar release along the   intra-articular portion of the knee along the  patellofemoral component and they   were allowed to perform an anterior interval release and then subluxed the   patella laterally and took the knee into flexion.  We then used osteotomes to   carefully remove the well cemented femoral component.  We then assessed the   tibial component, which demonstrated significant loosening of the tibial   component with cystic formation along the proximal medial tibial region, this   was removed.  We removed the granulation tissue along the cystic region of the   proximal tibia and then retained cement along the lateral aspect of the proximal   tibia.    We exposed the proximal tibia and then reamed centrally along the tibia to size   diameter of 14 mm.  We then contoured our cut, removing a small portion of the   bone laterally, but leaving most of the bone medially.  We did later bone graft   the cystic region.  With the canal exposed, we placed medial augment and 5 mm   and a size 3 component most normalized the patient's anatomy from the medial to   lateral cortical rims of the bone.  With the trial sleeve, 29 mm and the 14 mm   stem of 75 mm in length, we then applied the trial component with a 5-mm medial   augment.  It fit in excellent fashion.   A trial keel was applied.    I turned our attention proximally to the femoral region where we reamed   centrally up to a diameter of 16 mm.  We then contoured cut with a 2.5 cutting   guide demonstrating no need for the anterior cut with a +2 setting along the   cutting guide.  We then created a posterior condylar cuts medially and laterally   as well as chamfer cuts and need to augment the distal femoral region with an 8   mm augment, the posterior condylar regions bilaterally needed to be augmented   with a 4 mm augment.  Medially, we needed to augment with a 4-mm distal augment.    As a result, we then contoured our components, we reamed the central portion   of the femur and then made a box cut for a TC3 implant removing the  remnants of   the PCL structures as well as remnants of bone structures posteriorly.  Care was   taken to stay clear off the neurovascular structures.  At this point, we turned   our attention to the __05:40___ of trial components were assembled on the back   table with the 8 mm distal augment, the 4 mm lateral distal augment and   bilateral posterior augments of 4 mm.  Once again, a 16 mm diameter x 75 mm   length stem was applied with a 31 mm porous coat trial sleeve.  We tapped this   into position and assessed for stability with a 10 mm insert and felt that this   demonstrated excellent stability with good tracking of the patella with full   extension and normal varus and valgus stability in particular.  As a result, we   then used an Esmarch to exsanguinate the limb.  Tourniquet was inflated.  We   then assembled our actual components on the back table, mixed cement, dried the   surface and then impacted bone graft into the proximal medial tibial region and   the cystic area using cancellous bone chips.  Cement was applied to the   undersurface of the tibial component taking care to stay clear of the porous   portion of the sleeve and we impacted it in position.  We then repeated this   process proximally for the femoral component once again staying clear of the   porous coated portion of the sleeve.  Excellent stability was achieved, cement   was hardened.  Extruded cement was removed.  We then trialed once again   demonstrated a 10 mm rotating platform more stabilized the patient's knee.  As a   result, we then removed the trial and placed the actual liner into the area of   concern with excellent stability achieved.  Tourniquet was released.  Bleeding   sites were stabilized with Aquamantys probe.  We then placed additional Exparel   mixture in the posterior aspect of the capsule structure intracapsularly along   the tissues.  A total of 120 mL Exparel mixture 21-gauge needle.  They took the drain out of    the lateral aspect of the knee.    Synovial layer was closed with a series of #1 Vicryl sutures placed in   figure-of-eight fashion.  See the parapatellar tendon region was closed with a   series of #1 Vicryl placed in figure-of-eight fashion.  We then closed the   subvastus parapatellar tendon region as well as the VMO fascia with a #1   Stratafix suture placed in a running fashion.  Subcutaneous tissues were closed   with 3-0 Vicryls placed in inverted fashion.  Skin was closed with running 4-0   Monocryl sutures placed in subcuticular fashion along with application of   Dermabond ointment and Prineo tape.  We applied Xeroform gauze, ABD pads, cast   padding and long-leg SUSIE hose stocking and cooling unit.  The patient was   allowed to recover from the anesthetic and was extubated and was taken to   Recovery Room in stable condition.  At the completion of the case, all   instrument and sponge counts were correct.    NOTE:  Dr. Sri Roberts was present for the key portions of the procedure and did   perform the key parts of the procedure.    PHYSICAL THERAPY:  Begin physical therapy on postop day #0 while in the   hospital, with weightbearing as tolerated status along the right leg.    Outpatient therapy should be initiated in 3 to 5 days following discharge.    WEIGHTBEARING STATUS:  Full weightbearing as tolerated on the right leg with   walker or crutches.    CPM MACHINE:  Start the CPM machine postop day 0 minus 10 degrees, flexion to   120 degrees as tolerated.  The patient should use the CPM machine 6 to 8 hours   per day for the first 4 weeks following the surgery.  She will go home with the   CPM machine in place.          /billie [id number] blank(s)        ADRIANNA/TAMIA  dd: 12/12/2017 16:25:13 (CST)  td: 12/13/2017 01:48:30 (CST)  Doc ID   #[id number]  Job ID #[id number]    CC: [facility]     Dictation # [id number]

## 2017-12-13 VITALS
BODY MASS INDEX: 34.17 KG/M2 | HEART RATE: 81 BPM | RESPIRATION RATE: 18 BRPM | DIASTOLIC BLOOD PRESSURE: 55 MMHG | TEMPERATURE: 99 F | HEIGHT: 61 IN | OXYGEN SATURATION: 99 % | WEIGHT: 181 LBS | SYSTOLIC BLOOD PRESSURE: 98 MMHG

## 2017-12-13 LAB
HCT VFR BLD AUTO: 30.1 %
HGB BLD-MCNC: 9.8 G/DL

## 2017-12-13 PROCEDURE — 25000003 PHARM REV CODE 250: Performed by: ORTHOPAEDIC SURGERY

## 2017-12-13 PROCEDURE — 97110 THERAPEUTIC EXERCISES: CPT

## 2017-12-13 PROCEDURE — 85018 HEMOGLOBIN: CPT

## 2017-12-13 PROCEDURE — 97116 GAIT TRAINING THERAPY: CPT

## 2017-12-13 PROCEDURE — 85014 HEMATOCRIT: CPT

## 2017-12-13 PROCEDURE — 97165 OT EVAL LOW COMPLEX 30 MIN: CPT

## 2017-12-13 PROCEDURE — 36415 COLL VENOUS BLD VENIPUNCTURE: CPT

## 2017-12-13 PROCEDURE — 97535 SELF CARE MNGMENT TRAINING: CPT

## 2017-12-13 RX ADMIN — PROMETHAZINE HYDROCHLORIDE 25 MG: 25 TABLET ORAL at 05:12

## 2017-12-13 RX ADMIN — ASPIRIN 325 MG: 325 TABLET, DELAYED RELEASE ORAL at 09:12

## 2017-12-13 RX ADMIN — DOCUSATE SODIUM 100 MG: 100 CAPSULE, LIQUID FILLED ORAL at 09:12

## 2017-12-13 RX ADMIN — HYDROCODONE BITARTRATE AND ACETAMINOPHEN 1 TABLET: 10; 325 TABLET ORAL at 05:12

## 2017-12-13 RX ADMIN — OXYCODONE HYDROCHLORIDE 10 MG: 10 TABLET, FILM COATED, EXTENDED RELEASE ORAL at 09:12

## 2017-12-13 RX ADMIN — MUPIROCIN 1 G: 20 OINTMENT TOPICAL at 09:12

## 2017-12-13 RX ADMIN — PROMETHAZINE HYDROCHLORIDE 25 MG: 25 TABLET ORAL at 09:12

## 2017-12-13 NOTE — PLAN OF CARE
Problem: Patient Care Overview  Goal: Plan of Care Review  Outcome: Ongoing (interventions implemented as appropriate)  Pt remains free from injury or falls. Vital signs stable throughout night on room air. Up with assistance x 1.  Pain managed with PO medications, scheduled Phenergan administered for nausea. Dressing to right knee intact , hemovac x 1, polar ice in place. Spouse at bedside, bed in low locked position and call light within reach.  Will continue to monitor.

## 2017-12-13 NOTE — NURSING
Discharge instructions reviewed with pt at length and verbalized 100% understanding. Extra pair thigh high gloria hose sent home with pt. Saline lock lac dc with cath intact and site without redness or swelling

## 2017-12-13 NOTE — PLAN OF CARE
Problem: Physical Therapy Goal  Goal: Physical Therapy Goal  Goals to be met by: 17     Patient will increase functional independence with mobility by performin. Supine to sit with supervision. MET 17  2. Sit to supine with supervision. MET 17  3. Sit<>stand transfer with supervision using rolling walker. MET 17  4. Gait > 150 feet with SBA using rolling walker. MET 17  5. Ascend/descend curb step with CGA with rolling walker MET 17     Outcome: Ongoing (interventions implemented as appropriate)    Met all above goals

## 2017-12-13 NOTE — PT/OT/SLP DISCHARGE
Physical Therapy Discharge Summary    Name: Carleen Kincaid  MRN: [id number]   Principal Problem: Chronic pain of right knee     Patient Discharged from acute Physical Therapy on 17.  Please refer to prior PT noted date on 17 for functional status.     Assessment:     Goals partially met.    Objective:     GOALS:    Physical Therapy Goals        Problem: Physical Therapy Goal    Goal Priority Disciplines Outcome Goal Variances Interventions   Physical Therapy Goal     PT/OT, PT Ongoing (interventions implemented as appropriate)     Description:  Goals to be met by: 17     Patient will increase functional independence with mobility by performin. Supine to sit with supervision. MET 17  2. Sit to supine with supervision. MET 17  3. Sit<>stand transfer with supervision using rolling walker. MET 17  4. Gait > 150 feet with SBA using rolling walker. MET 17  5. Ascend/descend curb step with CGA with rolling walker MET 17                       Reasons for Discontinuation of Therapy Services  Transfer to alternate level of care.      Plan:     Patient Discharged to: Home with Home Health Service.    Trudy Martinez, PT  2017

## 2017-12-13 NOTE — PT/OT/SLP EVAL
"Occupational Therapy   Evaluation/Discharge    Name: Carleen Kincaid  MRN: [id number]  Admitting Diagnosis:  Chronic pain of right knee 1 Day Post-Op    Recommendations:     Discharge Recommendations: home with home health, home health PT, home health OT  Discharge Equipment Recommendations:  3-in-1 commode, walker, rolling  Barriers to discharge:  None    History:     Occupational Profile:  Living Environment: Pt lives with her  in 1-story house with no GARCIA. She has a walk-in shower with built-in seat and grab bars. She will also have access to raised toilet.   Previous level of function: mod (I) for functional mobility using rollator, (I) with ADLs  Equipment Owned:  rollator  Assistance upon Discharge:  will be taking the next couple of weeks off work, so will be available 24/7    Past Medical History:   Diagnosis Date    Anemia     Degenerative disc disease, cervical     General anesthetics causing adverse effect in therapeutic use     NO DEMEROL    Hyperlipidemia     Hypertension     Kidney stones     Psoriasis 1983    Stroke     retinal stroke       Past Surgical History:   Procedure Laterality Date    APPENDECTOMY      BACK SURGERY      lumbar spine    HYSTERECTOMY      partial    JOINT REPLACEMENT Bilateral     Knees    KNEE ARTHROSCOPY Right 8/27/2015    Dr Roberts    lithtotrispy      LUMBAR SPINE SURGERY  10/2014    L4-L5 fusion       Subjective     Chief Complaint: "Sorry, I still feel a little out of it this morning."  Patient/Family stated goals: return to PLOF  Communicated with: RN (Rachel) prior to session.  Pain/Comfort:  · Pain Rating 1: 2/10  · Location - Side 1: Right  · Location - Orientation 1: generalized  · Location 1: knee  · Pain Addressed 1: Reposition, Distraction, Pre-medicate for activity  · Pain Rating Post-Intervention 1: 2/10    Objective:     Patient found with: CPM, SCD, FCD, peripheral IV, cryotherapy    General Precautions: Standard, fall   Orthopedic " Precautions:RLE weight bearing as tolerated   Braces: N/A     Occupational Performance:    Bed Mobility:    · Patient completed Scooting/Bridging with supervision  · Patient completed Supine to Sit with supervision    Functional Mobility/Transfers:  · Patient completed Sit <> Stand Transfer with stand by assistance  with  rolling walker   · Patient completed Bed <> Chair Transfer using Stand Pivot technique with stand by assistance with rolling walker   · Ambulated house-hold distance in prep for ADLs using RW with SBA    Activities of Daily Living:  · UB Dressing: independence to don overhead shirt  · LB Dressing: stand by assistance to doff elias socks, don elias shoes including tying shoe laces, donning underwear and pants- SBA for dynamic standing balance    Cognitive/Visual Perceptual:  Cognitive/Psychosocial Skills:     -       Oriented to: Person, Place, Time and Situation   -       Follows Commands/attention:Follows multistep  commands  -       Communication: clear/fluent  -       Safety awareness/insight to disability: intact   -       Mood/Affect/Coping skills/emotional control: Appropriate to situation  Visual/Perceptual:      -Intact    Physical Exam:  Balance:    -       sitting balance with SPV at EOB throughout ADL routine; SBA for static and dynamic standing balance using RW  Postural examination/scapula alignment:    -       No postural abnormalities identified  Skin integrity: Visible skin intact and incisional site covered  Edema:  Mild (R) LE  Sensation:    -       Intact  Upper Extremity Range of Motion:     -       Right Upper Extremity: WFL  -       Left Upper Extremity: WFL  Upper Extremity Strength:    -       Right Upper Extremity: WFL  -       Left Upper Extremity: WFL   Strength:    -       Right Upper Extremity: WFL  -       Left Upper Extremity: WFL  Fine Motor Coordination:    -       Intact  Gross motor coordination:   WFL    Patient left up in chair with all lines intact, call button  "in reach, RN notified and  present    [facility] 6 Click:  [facility] Total Score: 20    Treatment & Education:  Educated on OT role, POC and D/C recs.   Discussed LB dressing techniques, safety with RW/ADLs and transfer techniques.   Education:    Assessment:     Carleen Kincaid is a 57 y.o. female with a medical diagnosis of Chronic pain of right knee.  She presents with the following performance deficits which are affecting function: weakness, impaired endurance, impaired self care skills, impaired functional mobilty, gait instability, impaired balance, decreased lower extremity function, decreased safety awareness, pain, decreased ROM, impaired skin, edema, orthopedic precautions.  Pt is currently requiring SBA for functional transfer and mobility using RW with cues for hand placement and technique. Pt required SBA for LB dressing for dynamic standing balance. Pt's pain has been well controlled and she reports improved (R) knee flexion from yesterday. Pt will have strong support at home and anticipated for D/C this AM. Recommend HH OT to follow up regarding stated deficits, home safety and to maximize return to PLOF.     Rehab Prognosis:  Good; patient would benefit from acute skilled OT services to address these deficits and reach maximum level of function.         Clinical Decision Makin.  OT Low:  "Pt evaluation falls under low complexity for evaluation coding due to performance deficits noted in 1-3 areas as stated above and 0 co-morbities affecting current functional status. Data obtained from problem focused assessments. No modifications or assistance was required for completion of evaluation. Only brief occupational profile and history review completed."     Plan:     Patient anticipated for D/C this AM. Recommend follow up with HH OT.   · Plan of Care Expires: 18  · Plan of Care Reviewed with: patient    This Plan of care has been discussed with the patient who was involved in its development " and understands and is in agreement with the identified goals and treatment plan    GOALS:    Occupational Therapy Goals        Problem: Occupational Therapy Goal    Goal Priority Disciplines Outcome Interventions   Occupational Therapy Goal     OT, PT/OT                     Time Tracking:     OT Date of Treatment: 12/13/17  OT Start Time: 0818  OT Stop Time: 0845  OT Total Time (min): 27 min    Billable Minutes:Evaluation 17  Self Care/Home Management 10    Deepa Marie OTR/L  12/13/2017

## 2017-12-13 NOTE — PT/OT/SLP PROGRESS
Physical Therapy Treatment    Patient Name:  Carleen Kincaid   MRN:  [id number]    Recommendations:     Discharge Recommendations:  outpatient PT   Discharge Equipment Recommendations: none (already delivered )   Barriers to discharge: None    Assessment:     Carleen Kincaid is a 57 y.o. female admitted with a medical diagnosis of Chronic pain of right knee.  She presents with the following impairments/functional limitations:  weakness, gait instability, impaired functional mobilty, impaired self care skills, decreased ROM patient was highly motivated and met all acute care goals. Patient will cont. To benefit from skilled PT services to increase strength, endurance and functional mobility.     Rehab Prognosis:  excellent; patient would benefit from acute skilled PT services to address these deficits and reach maximum level of function.      Recent Surgery: Procedure(s) (LRB):  REVISION-ARTHROPLASTY-KNEE (Right)  BONE GRAFT; FEMORAL AUTOLOGOUS BONE GRAFTING (Right) 1 Day Post-Op    Plan:     During this hospitalization, patient to be seen BID to address the above listed problems via gait training, therapeutic activities, therapeutic exercises, neuromuscular re-education  · Plan of Care Expires:  01/11/18   Plan of Care Reviewed with: patient    Subjective     Communicated with nurse prior to session.  Patient found seated in bedside chair  upon PT entry to room, agreeable to treatment.      Chief Complaint: dry mouth  Patient comments/goals: home  Pain/Comfort:  · Pain Rating 1: 0/10  · Location - Orientation 1: generalized  · Location 1: knee  · Pain Addressed 1: Pre-medicate for activity  · Pain Rating Post-Intervention 1: 1/10    Patients cultural, spiritual, Confucianism conflicts given the current situation: none specified    Objective:     Patient found with: cryotherapy, peripheral IV     General Precautions: Standard, fall   Orthopedic Precautions:RLE weight bearing as tolerated   Braces: N/A     Functional  "Mobility:  · Sit to stand from bed with rolling walker with Supervision   · Stand to sit onto bedside chair with rolling walker with Modified independent  · Patient gait trained 300 feet with rolling walker with Supervision   · Ascend/ descend 6" curb step with RW with Supervision       Therapeutic Activities and Exercises:  Patient performed therex X 15 reps AROM R LE per TKA protocol     Patient left up in chair with all lines intact, call button in reach, nurse notified and spouse present..    GOALS:    Physical Therapy Goals        Problem: Physical Therapy Goal    Goal Priority Disciplines Outcome Goal Variances Interventions   Physical Therapy Goal     PT/OT, PT Ongoing (interventions implemented as appropriate)     Description:  Goals to be met by: 17     Patient will increase functional independence with mobility by performin. Supine to sit with supervision. MET 17  2. Sit to supine with supervision. MET 17  3. Sit<>stand transfer with supervision using rolling walker. MET 17  4. Gait > 150 feet with SBA using rolling walker. MET 17  5. Ascend/descend curb step with CGA with rolling walker MET 17                       Time Tracking:     PT Received On: 17  PT Start Time: 825     PT Stop Time: 853  PT Total Time (min): 28 min     Billable Minutes: Gait Training 18 and Therapeutic Exercise 10    Treatment Type: Treatment  PT/PTA: PTA     PTA Visit Number: 0     Montserrat Villegas, PTA  2017  "

## 2017-12-13 NOTE — PROGRESS NOTES
POD 1 revision R TKA    Doing well. Pain controlled with medications. ZEYNEP.    Vitals:    12/12/17 2047 12/13/17 0032 12/13/17 0502 12/13/17 0512   BP:  102/65 (!) 111/57 120/72   BP Location:       Patient Position:       Pulse: 94 87 77 107   Resp: 18      Temp:  98 °F (36.7 °C) 97.7 °F (36.5 °C)    TempSrc:       SpO2: 97% 97% 99%    Weight:       Height:         RLE- dressing c/d/i   Drain dc'd   SILT L4-S1   +TA/GSC/EHL   BCR x5    Pt s/p above. Doing well   mobilize   pain control   DVT ppx   Plan home today

## 2017-12-13 NOTE — DISCHARGE SUMMARY
[facility]    Discharge Note    SUMMARY     Admit Date: 12/12/2017    Discharge Date and Time:   12/13/2017 7:59 AM    Pre-op Diagnosis:  Painful orthopaedic hardware [T84.84XA]    Post-op Diagnosis:  Post-Op Diagnosis Codes:     * Painful orthopaedic hardware [T84.84XA]    Procedure: Procedure(s) (LRB):  REVISION-ARTHROPLASTY-KNEE (Right)  BONE GRAFT; FEMORAL AUTOLOGOUS BONE GRAFTING (Right)    Hospital Course (synopsis of major diagnoses, care, treatment, and services provided during the course of the hospital stay): Patient underwent knee surgery and was transferred to PACU in stable condition.  In PACU, patient received appropriate post-operative care and was transferred to medical floor for neurovascular monitoring and pain control.  There patient progressed appropriately. Once patient was ready, female was discharged home with plans for physical therapy and follow-up with the operative surgeon.    Diet: Regular       Final Diagnosis: Post-Op Diagnosis Codes:     * Painful orthopaedic hardware [T84.84XA]    Disposition: Home or Self Care    Follow Up/Patient Instructions:     Medications:  Reconciled Home Medications:   Current Discharge Medication List      CONTINUE these medications which have NOT CHANGED    Details   cyclobenzaprine (FLEXERIL) 5 MG tablet Take by mouth daily as needed for Muscle spasms. Not sure of dosage      diclofenac (VOLTAREN) 75 MG EC tablet Take 1 tablet (75 mg total) by mouth 2 (two) times daily.  Qty: 60 tablet, Refills: 0      lisinopril (PRINIVIL,ZESTRIL) 5 MG tablet Take 5 mg by mouth once daily.      mupirocin (BACTROBAN) 2 % ointment AAA bid  Qty: 30 g, Refills: 2    Associated Diagnoses: Furuncle      traMADol (ULTRAM) 50 mg tablet Take 1 tablet (50 mg total) by mouth every 6 (six) hours as needed for Pain.  Qty: 40 tablet, Refills: 0      adalimumab (HUMIRA) 40 mg/0.8 mL injection Inject 0.8 mLs (40 mg total) into the skin every 14 (fourteen) days.  Qty: 2 vial,  "Refills: 6    Associated Diagnoses: Psoriasis; Encounter for long-term (current) use of other medications      aspirin (ECOTRIN) 325 MG EC tablet Take 1 tablet (325 mg total) by mouth once daily.  Refills: 0      calcipotriene-betamethasone (ENSTILAR) 0.005-0.064 % Foam Apply 1 application topically once daily.  Qty: 60 g, Refills: 5    Associated Diagnoses: Psoriasis vulgaris      calcipotriene-betamethasone (TACLONEX SCALP) external suspension Apply topically once daily.  Qty: 60 g, Refills: 1    Associated Diagnoses: Psoriasis; Encounter for long-term (current) use of other medications      CLODAN 0.05 % shampoo Apply to dry scalp 15 min prior to washing/rinsing qod  Qty: 1 Bottle, Refills: 3    Associated Diagnoses: Psoriasis vulgaris      diclofenac sodium 1.5 % Drop       diphenhydramine-acetaminophen (TYLENOL PM)  mg Tab Take 1 tablet by mouth once daily.      ergocalciferol (ERGOCALCIFEROL) 50,000 unit Cap Refills: 1      hydrocodone-acetaminophen 10-325mg (NORCO)  mg Tab Take 1 tablet by mouth every 6 (six) hours as needed for Pain.  Qty: 60 tablet, Refills: 0      nitrofurantoin, macrocrystal-monohydrate, (MACROBID) 100 MG capsule       pitavastatin (LIVALO) 4 mg Tab Take by mouth as needed.      promethazine (PHENERGAN) 25 MG tablet Take 1 tablet (25 mg total) by mouth every 4 (four) hours.  Qty: 60 tablet, Refills: 0         STOP taking these medications       diclofenac sodium 1 % Gel Comments:   Reason for Stopping:               Discharge Procedure Orders  COMMODE FOR HOME USE   Order Specific Question Answer Comments   Type: Standard    Height: 5' 1" (1.549 m)    Weight: 82.1 kg (181 lb)    Length of need (1-99 months): 99      WALKER FOR HOME USE   Order Specific Question Answer Comments   Type of Walker: Mil (4'4"-5'7")    With wheels? Yes    Height: 5' 1" (1.549 m)    Weight: 82.1 kg (181 lb)    Length of need (1-99 months): 99    Please check all that apply: Patient's condition " "impairs ambulation.    Please check all that apply: Patient is unable to safely ambulate without equipment.    Please check all that apply: Patient needs help to get in and out of chair.    Please check all that apply: Walker will be used for gait training.      WALKER FOR HOME USE   Order Specific Question Answer Comments   Type of Walker: Adult (5'4"-6'6")    With wheels? Yes    Height: 5' 1" (1.549 m)    Weight: 82.1 kg (181 lb)    Length of need (1-99 months): 1    Does patient have medical equipment at home? rollator    Please check all that apply: Patient's condition impairs ambulation.    Please check all that apply: Patient is unable to safely ambulate without equipment.    Please check all that apply: Patient needs help to get in and out of chair.      Diet general     Call MD for:  temperature >100.4     Call MD for:  persistent nausea and vomiting     Call MD for:  severe uncontrolled pain     Call MD for:  difficulty breathing, headache or visual disturbances     Call MD for:  redness, tenderness, or signs of infection (pain, swelling, redness, odor or green/yellow discharge around incision site)     Call MD for:  hives     Call MD for:  persistent dizziness or light-headedness     Call MD for:  extreme fatigue     Weight bearing as tolerated     Remove dressing in 72 hours       Follow-up Information     [facility].    Why:  Home Health  Contact information:  [address] [facility] [address]  [phone]           [facility]    Specialty:  DME Provider  Why:  ifrah rolling walker & 3 in 1 commode  Contact information:  [address]  [facility] [address]  [phone]             Sri Roberts MD.    Specialties:  Sports Medicine, Orthopedic Surgery  Why:  as scheduled pre op  Contact information:  [address]  [facility] [address]  [phone]                   "

## 2017-12-13 NOTE — PLAN OF CARE
Mil rolling walker & 3 in 1 commode delivered to patient's room      12/13/17 0853   Final Note   Assessment Type Final Discharge Note   Discharge Disposition Home-Health   What phone number can be called within the next 1-3 days to see how you are doing after discharge? [phone]   Discharge plans and expectations educations in teach back method with documentation complete? Yes   Right Care Referral Info   Post Acute Recommendation Home-care   Facility Name [facility]

## 2017-12-13 NOTE — PLAN OF CARE
Problem: Occupational Therapy Goal  Goal: Occupational Therapy Goal  Pt anticipated for D/C this AM, therefore no further acute OT goals at this time. Recommend pt to f/u with  OT upon D/C.     Deepa Marie, OTR/L  12/13/2017

## 2017-12-13 NOTE — NURSING
Gave verbal and written dc instructions, Pt verbalized understanding. Pt sitting in chair awaiting trasport, no s/s of distress at this time

## 2017-12-15 ENCOUNTER — TELEPHONE (OUTPATIENT)
Dept: SPORTS MEDICINE | Facility: CLINIC | Age: 57
End: 2017-12-15

## 2017-12-15 NOTE — TELEPHONE ENCOUNTER
----- Message from Delmi Casarez RN sent at 12/15/2017 11:28 AM CST -----  Contact: [facility] / Irma Mehta      ----- Message -----  From: Irma Cheung MA  Sent: 12/15/2017  11:12 AM  To: Armando ATKINSON Staff    Calling in regards to pt being on antibiotics before sx and afterwards and seeing if Dr. Roberts can prescribe the pt rx for yeast infection. Irma can be reached at [phone].

## 2017-12-15 NOTE — TELEPHONE ENCOUNTER
Spoke with the  nurse. We did not prescribe the pre-op antibiotics and would defer to the primary care doctor

## 2017-12-15 NOTE — OP NOTE
DATE OF PROCEDURE:  12/12/2017     ATTENDING SURGEON:  Sri Roberts M.D.     CO-SURGEON:  Santiago Qiu M.D.    The complexity of the case required co-surgeon to help with the case. A separate operative report will be dictated by Dr Sri Roberts.     FIRST ASSISTANT:  SMA Sreekanth-assistant.     SECOND ASSISTANT:  Jossue Irene M.D.     PREOPERATIVE DIAGNOSIS:  Failed right total knee replacement.     POSTOPERATIVE DIAGNOSIS:  Failed right total knee replacement.     PROCEDURES PERFORMED:  Right complex revision total knee replacement.     ANESTHESIA:  Adductor block, concomitant general with LMA and local anesthetics   using 120 mL Exparel mixture.     FLUIDS IN THE CASE:  700 mL LR.     ESTIMATED BLOOD LOSS:  300 mL.     URINE OUTPUT:  Straight catheter postoperatively.     TOURNIQUET TIME:  22 minutes.     COMPLICATIONS:  None.     IMPLANTS UTILIZED:  SmartSet with gentamicin was utilized x2, Musculoskeletal   [employer] cancellous chips (30 mL), tibial components: MBT revision   metaphyseal sleeve porous 28 mm, sigma tibial insert rotating platform TC3 size   2.5, 10 mm component, tibial tray rotating platform MBT revision size 3 cemented   component, universal stem fluted 75 x 14 mm tibial component, MBT revisions   step wedge size 3, 5 mm cemented component.  Femoral components:  PFC sigma   distal augment size 2.5, 8 mm right, PFC sigma distal augment size 2.5, 4 mm   right, universal stem fluted 75 x 16 mm stem, PFC sigma femoral adapter bolt +2,   PFC sigma femoral adapter 5 degree, PFC sigma posterior augment combo cemented   size 2.5, 4 mm, PFC Sigma posterior augment combo cemented size 2.5, 4 mm   universal femoral sleeve full porous 31 mm component, sigma femoral TC3 cemented   size 2.5 right component.     INDICATIONS FOR OPERATIVE PROCEDURE:  Carleen Kincaid is a 57-year-old female with   history of right knee pain and pathology.  She was noted to have significant   problems in the  area of concern following previous ConforMIS implant placement,   she had significant loss of the tibial control with subsidence of the tibial   component and radiographic assessments demonstrating loosened component with   cystic formation along the proximal medial tibial region.  There are always   problems and problems with aggressive loads as well as the stability of the   knee, the patient was deemed to be an appropriate candidate for operative   intervention.     Complexity of case was increased based on the revision nature of procedure and   change in overall bony anatomy secondary to previous operative intervention.     Co-surgeon duties:  Co-surgeon duties were required secondary to the complex   nature of the revision procedure, change in overall bony anatomy and soft tissue   structures with a complex intraoperative releases and decision making required.     DESCRIPTION OF PROCEDURE:  The patient was brought into the Operating Room,   placed in supine position.  Upon application of adductor block in the   preoperative hold area, the patient underwent general sedation and placement of   LMA to stabilize the airway.  The patient was given the appropriate dose of   antibiotics based on body weight.  Time-out was utilized to verify right side as   the operative site.  Both upper extremities were placed in comfortable   position.  Left leg was carefully padded along the heel and peroneal nerve   regions.  Right leg was then prepped and draped in a sterile fashion with   ChloraPrep material with a bump under the right hip and lateral post for   intraoperative positioning as well as a popliteal post along the right side of   the table.  Ioban was placed over the knee.  Medial based incision was carried   down through the skin down the fat and fascia.  Flaps were then raised   superiorly, inferiorly, medially and laterally along the area of concern.  We   then exposed the medial portion of the knee and performed  a medial subvastus   approach to the knee.  We performed significant scar release along the   intra-articular portion of the knee along the patellofemoral component and they   were allowed to perform an anterior interval release and then subluxed the   patella laterally and took the knee into flexion.  We then used osteotomes to   carefully remove the well cemented femoral component.  We then assessed the   tibial component, which demonstrated significant loosening of the tibial   component with cystic formation along the proximal medial tibial region, this   was removed.  We removed the granulation tissue along the cystic region of the   proximal tibia and then retained cement along the lateral aspect of the proximal   tibia.     We exposed the proximal tibia and then reamed centrally along the tibia to size   diameter of 14 mm.  We then contoured our cut, removing a small portion of the   bone laterally, but leaving most of the bone medially.  We did later bone graft   the cystic region.  With the canal exposed, we placed medial augment and 5 mm   and a size 3 component most normalized the patient's anatomy from the medial to   lateral cortical rims of the bone.  With the trial sleeve, 29 mm and the 14 mm   stem of 75 mm in length, we then applied the trial component with a 5-mm medial   augment.  It fit in excellent fashion.   A trial keel was applied.     I turned our attention proximally to the femoral region where we reamed   centrally up to a diameter of 16 mm.  We then contoured cut with a 2.5 cutting   guide demonstrating no need for the anterior cut with a +2 setting along the   cutting guide.  We then created a posterior condylar cuts medially and laterally   as well as chamfer cuts and need to augment the distal femoral region with an 8   mm augment, the posterior condylar regions bilaterally needed to be augmented   with a 4 mm augment.  Medially, we needed to augment with a 4-mm distal augment.    As a  result, we then contoured our components, we reamed the central portion   of the femur and then made a box cut for a TC3 implant removing the remnants of   the PCL structures as well as remnants of bone structures posteriorly.  Care was   taken to stay clear off the neurovascular structures.  At this point, we turned   our attention to the __05:40___ of trial components were assembled on the back   table with the 8 mm distal augment, the 4 mm lateral distal augment and   bilateral posterior augments of 4 mm.  Once again, a 16 mm diameter x 75 mm   length stem was applied with a 31 mm porous coat trial sleeve.  We tapped this   into position and assessed for stability with a 10 mm insert and felt that this   demonstrated excellent stability with good tracking of the patella with full   extension and normal varus and valgus stability in particular.  As a result, we   then used an Esmarch to exsanguinate the limb.  Tourniquet was inflated.  We   then assembled our actual components on the back table, mixed cement, dried the   surface and then impacted bone graft into the proximal medial tibial region and   the cystic area using cancellous bone chips.  Cement was applied to the   undersurface of the tibial component taking care to stay clear of the porous   portion of the sleeve and we impacted it in position.  We then repeated this   process proximally for the femoral component once again staying clear of the   porous coated portion of the sleeve.  Excellent stability was achieved, cement   was hardened.  Extruded cement was removed.  We then trialed once again   demonstrated a 10 mm rotating platform more stabilized the patient's knee.  As a   result, we then removed the trial and placed the actual liner into the area of   concern with excellent stability achieved.  Tourniquet was released.  Bleeding   sites were stabilized with Aquamantys probe.  We then placed additional Exparel   mixture in the posterior aspect of  the capsule structure intracapsularly along   the tissues.  A total of 120 mL exparel was injected in to the soft tissues via 21-gauge needle.  They took the drain out of   the lateral aspect of the knee.     Synovial layer was closed with a series of #1 Vicryl sutures placed in   figure-of-eight fashion.  See the parapatellar tendon region was closed with a   series of #1 Vicryl placed in figure-of-eight fashion.  We then closed the   subvastus parapatellar tendon region as well as the VMO fascia with a #1   Stratafix suture placed in a running fashion.  Subcutaneous tissues were closed   with 3-0 Vicryls placed in inverted fashion.  Skin was closed with running 4-0   Monocryl sutures placed in subcuticular fashion along with application of   Dermabond ointment and Prineo tape.  We applied Xeroform gauze, ABD pads, cast   padding and long-leg SUSIE hose stocking and cooling unit.  The patient was   allowed to recover from the anesthetic and was extubated and was taken to   Recovery Room in stable condition.  At the completion of the case, all   instrument and sponge counts were correct.     NOTE:  Dr. Santiago Qiu was present for the key portions of the procedure and did   perform the key parts of the procedure.     PHYSICAL THERAPY:  Begin physical therapy on postop day #0 while in the   hospital, with weightbearing as tolerated status along the right leg.     Outpatient therapy should be initiated in 3 to 5 days following discharge.     WEIGHTBEARING STATUS:  Full weightbearing as tolerated on the right leg with   walker or crutches.     CPM MACHINE:  Start the CPM machine postop day 0 minus 10 degrees, flexion to   120 degrees as tolerated.  The patient should use the CPM machine 6 to 8 hours   per day for the first 4 weeks following the surgery.  She will go home with the   CPM machine in place.

## 2017-12-20 ENCOUNTER — HOSPITAL ENCOUNTER (OUTPATIENT)
Dept: RADIOLOGY | Facility: HOSPITAL | Age: 57
Discharge: HOME | End: 2017-12-20
Attending: ORTHOPAEDIC SURGERY
Payer: COMMERCIAL

## 2017-12-20 ENCOUNTER — OFFICE VISIT (OUTPATIENT)
Dept: SPORTS MEDICINE | Facility: CLINIC | Age: 57
End: 2017-12-20
Payer: COMMERCIAL

## 2017-12-20 VITALS
WEIGHT: 181 LBS | BODY MASS INDEX: 34.17 KG/M2 | HEART RATE: 96 BPM | DIASTOLIC BLOOD PRESSURE: 84 MMHG | HEIGHT: 61 IN | SYSTOLIC BLOOD PRESSURE: 138 MMHG

## 2017-12-20 DIAGNOSIS — M23.91 INTERNAL DERANGEMENT OF RIGHT KNEE: ICD-10-CM

## 2017-12-20 DIAGNOSIS — M25.569 KNEE PAIN, UNSPECIFIED CHRONICITY, UNSPECIFIED LATERALITY: Primary | ICD-10-CM

## 2017-12-20 DIAGNOSIS — Z96.653 HISTORY OF TOTAL BILATERAL KNEE REPLACEMENT: ICD-10-CM

## 2017-12-20 DIAGNOSIS — G89.29 CHRONIC PAIN OF RIGHT KNEE: ICD-10-CM

## 2017-12-20 DIAGNOSIS — M25.569 KNEE PAIN, UNSPECIFIED CHRONICITY, UNSPECIFIED LATERALITY: ICD-10-CM

## 2017-12-20 DIAGNOSIS — M25.561 CHRONIC PAIN OF RIGHT KNEE: ICD-10-CM

## 2017-12-20 PROCEDURE — 99024 POSTOP FOLLOW-UP VISIT: CPT | Mod: S$GLB,,, | Performed by: ORTHOPAEDIC SURGERY

## 2017-12-20 PROCEDURE — 73564 X-RAY EXAM KNEE 4 OR MORE: CPT | Mod: 26,50,, | Performed by: RADIOLOGY

## 2017-12-20 PROCEDURE — 73564 X-RAY EXAM KNEE 4 OR MORE: CPT | Mod: TC,50,PO

## 2017-12-20 PROCEDURE — 99999 PR PBB SHADOW E&M-EST. PATIENT-LVL IV: CPT | Mod: PBBFAC,,, | Performed by: ORTHOPAEDIC SURGERY

## 2017-12-20 NOTE — PROGRESS NOTES
Subjective:          Chief Complaint: Carleen Kincaid is a 57 y.o. female who had concerns including Pain of the Left Knee.    56 yo female first post operative visit from revision right total knee arthroplasty. Doing well.    DATE OF PROCEDURE:  12/12/2017     ATTENDING SURGEON:  Sri oRberts M.D.     CO-SURGEON:  Santiago Qiu M.D.     FIRST ASSISTANT:  Kayla Patel [facility]-assistant.     SECOND ASSISTANT:  Jossue Irene M.D.     PREOPERATIVE DIAGNOSIS:  Failed right total knee replacement.     POSTOPERATIVE DIAGNOSIS:  Failed right total knee replacement.     PROCEDURES PERFORMED:  Right complex revision total knee replacement.        Pain   Associated symptoms include joint swelling. Pertinent negatives include no chest pain, fever, numbness or rash.       Review of Systems   Constitution: Negative for fever and night sweats.   HENT: Negative for hearing loss.    Eyes: Negative for blurred vision and visual disturbance.   Cardiovascular: Negative for chest pain and leg swelling.   Respiratory: Negative for shortness of breath.    Endocrine: Negative for polyuria.   Hematologic/Lymphatic: Negative for bleeding problem.   Skin: Negative for rash.   Musculoskeletal: Positive for joint pain and joint swelling. Negative for back pain, muscle cramps and muscle weakness.   Gastrointestinal: Negative for melena.   Genitourinary: Negative for hematuria.   Neurological: Negative for loss of balance, numbness and paresthesias.   Psychiatric/Behavioral: Negative for altered mental status.       Pain Related Questions  Over the past 3 days, what was your average pain during activity? (I.e. running, jogging, walking, climbing stairs, getting dressed, ect.): 4  Over the past 3 days, what was your highest pain level?: 4  Over the past 3 days, what was your lowest pain level? : 3    Other  How many nights a week are you awakened by your affected body part?: 3  Was the patient's HEIGHT measured or patient reported?: Patient  Reported  Was the patient's WEIGHT measured or patient reported?: Patient Reported      Objective:        General: Carleen is well-developed, well-nourished, appears stated age, in no acute distress, alert and oriented to time, place and person.     General    Vitals reviewed.  Constitutional: She is oriented to person, place, and time. She appears well-developed and well-nourished. No distress.   HENT:   Mouth/Throat: No oropharyngeal exudate.   Eyes: Right eye exhibits no discharge. Left eye exhibits no discharge.   Neck: Normal range of motion.   Pulmonary/Chest: Effort normal and breath sounds normal. No respiratory distress.   Neurological: She is alert and oriented to person, place, and time. She has normal reflexes. No cranial nerve deficit. Coordination normal.   Psychiatric: She has a normal mood and affect. Her behavior is normal. Judgment and thought content normal.     General Musculoskeletal Exam   Gait: normal       Right Knee Exam     Inspection   Erythema: absent  Scars: present  Swelling: present  Effusion: effusion  Deformity: deformity  Bruising: absent    Range of Motion   Extension: 0   Flexion: 120     Tests   Meniscus   Sonal:  Medial - negative Lateral - negative  Ligament Examination Lachman: normal (-1 to 2mm) PCL-Posterior Drawer: normal (0 to 2mm)     MCL - Valgus: normal (0 to 2mm)  LCL - Varus: normalPivot Shift: normal (Equal)Reverse Pivot Shift: normal (Equal)Dial Test at 30 degrees: normal (< 5 degrees)Dial Test at 90 degrees: normal (< 5 degrees)  Posterior Sag Test: negative  Posterolateral Corner: unstable (>15 degrees difference)  Patella   Patellar Apprehension: negative  Passive Patellar Tilt: neutral  Patellar Tracking: normal  Patellar Glide (quadrants): Lateral - 1   Medial - 2  Q-Angle at 90 degrees: normal  Patellar Grind: negative  J-Sign: none    Other   Meniscal Cyst: absent  Popliteal (Baker's) Cyst: absent  Sensation: normal    Comments:  Incision c/d/i    Left Knee  Exam     Inspection   Erythema: absent  Scars: absent  Swelling: absent  Effusion: absent  Deformity: deformity  Bruising: absent    Tenderness   The patient is experiencing no tenderness.         Range of Motion   Extension: 0   Flexion: 120     Tests   Meniscus   Sonal:  Medial - negative Lateral - negative  Stability Lachman: normal (-1 to 2mm) PCL-Posterior Drawer: normal (0 to 2mm)  MCL - Valgus: normal (0 to 2mm)  LCL - Varus: normal (0 to 2mm)Pivot Shift: normal (Equal)Reverse Pivot Shift: normal (Equal)Dial Test at 30 degrees: normal (< 5 degrees)Dial Test at 90 degrees: normal (< 5 degrees)  Posterior Sag Test: negative  Posterolateral Corner: unstable (>15 degrees difference)  Patella   Patellar Apprehension: negative  Passive Patellar Tilt: neutral  Patellar Tracking: normal  Patellar Glide (Quadrants): Lateral - 1 Medial - 2  Q-Angle at 90 degrees: normal  Patellar Grind: negative  J-Sign: J sign absent    Other   Meniscal Cyst: absent  Popliteal (Baker's) Cyst: absent  Sensation: normal    Right Hip Exam     Tests   Julio Cesar: negative  Left Hip Exam     Tests   Julio Cesar: negative          Muscle Strength   Right Lower Extremity   Hip Abduction: 5/5   Quadriceps:  5/5   Hamstrin/5   Left Lower Extremity   Hip Abduction: 5/5   Quadriceps:  5/5   Hamstrin/5     Reflexes     Left Side  Quadriceps:  2+  Achilles:  2+    Right Side   Quadriceps:  2+  Achilles:  2+    Vascular Exam     Right Pulses  Dorsalis Pedis:      2+  Posterior Tibial:      2+        Left Pulses  Dorsalis Pedis:      2+  Posterior Tibial:      2+          RADRIOGRAPHS TODAY: Radiographs ordered and reviewed today in clinic of the bilateral knee demonstrates interval revision right total knee arthroplasty in good alignment.     Right TC 3 implant - noted in good position and left ConforMIS TKR in good position              Assessment:       Encounter Diagnoses   Name Primary?    Knee pain, unspecified chronicity, unspecified  laterality Yes    History of total bilateral knee replacement     Chronic pain of right knee     Internal derangement of right knee           Plan:       1. IKDC, SF-12 and KOOS was not filled out today in clinic.     RTC in 4 weeks with Dr. Sri Roberts Patient will fill out IKDC, SF-12 and KOOS on return.    2. Begin outpatient PT    3. Continue pain medications as prescribed                      [facility] patient questionnaires have been collected today.

## 2018-01-03 RX ORDER — HYDROCODONE BITARTRATE AND ACETAMINOPHEN 10; 325 MG/1; MG/1
1 TABLET ORAL EVERY 6 HOURS PRN
Qty: 60 TABLET | Refills: 0 | Status: SHIPPED | OUTPATIENT
Start: 2018-01-03 | End: 2018-06-18 | Stop reason: ALTCHOICE

## 2018-01-19 ENCOUNTER — OFFICE VISIT (OUTPATIENT)
Dept: SPORTS MEDICINE | Facility: CLINIC | Age: 58
End: 2018-01-19
Payer: COMMERCIAL

## 2018-01-19 ENCOUNTER — HOSPITAL ENCOUNTER (OUTPATIENT)
Dept: RADIOLOGY | Facility: HOSPITAL | Age: 58
Discharge: HOME | End: 2018-01-19
Attending: ORTHOPAEDIC SURGERY
Payer: COMMERCIAL

## 2018-01-19 VITALS
SYSTOLIC BLOOD PRESSURE: 139 MMHG | HEART RATE: 91 BPM | WEIGHT: 181 LBS | DIASTOLIC BLOOD PRESSURE: 98 MMHG | HEIGHT: 61 IN | BODY MASS INDEX: 34.17 KG/M2

## 2018-01-19 DIAGNOSIS — G89.29 CHRONIC PAIN OF RIGHT KNEE: ICD-10-CM

## 2018-01-19 DIAGNOSIS — M25.569 KNEE PAIN, UNSPECIFIED CHRONICITY, UNSPECIFIED LATERALITY: Primary | ICD-10-CM

## 2018-01-19 DIAGNOSIS — M25.569 KNEE PAIN, UNSPECIFIED CHRONICITY, UNSPECIFIED LATERALITY: ICD-10-CM

## 2018-01-19 DIAGNOSIS — M25.561 CHRONIC PAIN OF RIGHT KNEE: ICD-10-CM

## 2018-01-19 DIAGNOSIS — Z96.653 HISTORY OF TOTAL BILATERAL KNEE REPLACEMENT: ICD-10-CM

## 2018-01-19 PROBLEM — Z96.659 FAILURE OF TOTAL KNEE ARTHROPLASTY: Status: RESOLVED | Noted: 2017-09-06 | Resolved: 2018-01-19

## 2018-01-19 PROBLEM — T84.018A FAILURE OF TOTAL KNEE ARTHROPLASTY: Status: RESOLVED | Noted: 2017-09-06 | Resolved: 2018-01-19

## 2018-01-19 PROCEDURE — 73564 X-RAY EXAM KNEE 4 OR MORE: CPT | Mod: 26,50,, | Performed by: RADIOLOGY

## 2018-01-19 PROCEDURE — 73564 X-RAY EXAM KNEE 4 OR MORE: CPT | Mod: TC,50,FY,PO

## 2018-01-19 PROCEDURE — 99999 PR PBB SHADOW E&M-EST. PATIENT-LVL III: CPT | Mod: PBBFAC,,, | Performed by: ORTHOPAEDIC SURGERY

## 2018-01-19 PROCEDURE — 99024 POSTOP FOLLOW-UP VISIT: CPT | Mod: S$GLB,,, | Performed by: ORTHOPAEDIC SURGERY

## 2018-01-19 NOTE — PROGRESS NOTES
Subjective:          Chief Complaint: Carleen Kincaid is a 57 y.o. female who had concerns including Post-op Evaluation of the Right Knee.    Patient is here for a follow up for her right knee. She is still having some pain in her hip and shin from the way she is walking. She has little to no pain her knee.     DATE OF PROCEDURE:  12/12/2017     ATTENDING SURGEON:  Sri Roberts M.D.     CO-SURGEON:  Santiago Qiu M.D.     FIRST ASSISTANT:  SMA Sreekanth-assistant.     SECOND ASSISTANT:  Jossue Irene M.D.     PREOPERATIVE DIAGNOSIS:  Failed right total knee replacement.     POSTOPERATIVE DIAGNOSIS:  Failed right total knee replacement.     PROCEDURES PERFORMED:  Right complex revision total knee replacement.        Pain   Associated symptoms include joint swelling. Pertinent negatives include no chest pain, fever, numbness or rash.       Review of Systems   Constitution: Negative for fever and night sweats.   HENT: Negative for hearing loss.    Eyes: Negative for blurred vision and visual disturbance.   Cardiovascular: Negative for chest pain and leg swelling.   Respiratory: Negative for shortness of breath.    Endocrine: Negative for polyuria.   Hematologic/Lymphatic: Negative for bleeding problem.   Skin: Negative for rash.   Musculoskeletal: Positive for joint pain and joint swelling. Negative for back pain, muscle cramps and muscle weakness.   Gastrointestinal: Negative for melena.   Genitourinary: Negative for hematuria.   Neurological: Negative for loss of balance, numbness and paresthesias.   Psychiatric/Behavioral: Negative for altered mental status.       Pain Related Questions  Over the past 3 days, what was your average pain during activity? (I.e. running, jogging, walking, climbing stairs, getting dressed, ect.): 5  Over the past 3 days, what was your highest pain level?: 5  Over the past 3 days, what was your lowest pain level? : 0    Other  How many nights a week are you awakened by your  affected body part?: 0  Was the patient's HEIGHT measured or patient reported?: Patient Reported  Was the patient's WEIGHT measured or patient reported?: Measured      Objective:        General: Carleen is well-developed, well-nourished, appears stated age, in no acute distress, alert and oriented to time, place and person.     General    Vitals reviewed.  Constitutional: She is oriented to person, place, and time. She appears well-developed and well-nourished. No distress.   HENT:   Mouth/Throat: No oropharyngeal exudate.   Eyes: Right eye exhibits no discharge. Left eye exhibits no discharge.   Neck: Normal range of motion.   Pulmonary/Chest: Effort normal and breath sounds normal. No respiratory distress.   Neurological: She is alert and oriented to person, place, and time. She has normal reflexes. No cranial nerve deficit. Coordination normal.   Psychiatric: She has a normal mood and affect. Her behavior is normal. Judgment and thought content normal.     General Musculoskeletal Exam   Gait: normal       Right Knee Exam     Inspection   Erythema: absent  Scars: present  Swelling: present  Effusion: effusion  Deformity: deformity  Bruising: absent    Tenderness   The patient is experiencing no tenderness.         Range of Motion   Extension: 0   Flexion: 120     Tests   Meniscus   Sonal:  Medial - negative Lateral - negative  Ligament Examination Lachman: normal (-1 to 2mm) PCL-Posterior Drawer: normal (0 to 2mm)     MCL - Valgus: normal (0 to 2mm)  LCL - Varus: normalPivot Shift: normal (Equal)Reverse Pivot Shift: normal (Equal)Dial Test at 30 degrees: normal (< 5 degrees)Dial Test at 90 degrees: normal (< 5 degrees)  Posterior Sag Test: negative  Posterolateral Corner: unstable (>15 degrees difference)  Patella   Patellar Apprehension: negative  Passive Patellar Tilt: neutral  Patellar Tracking: normal  Patellar Glide (quadrants): Lateral - 1   Medial - 2  Q-Angle at 90 degrees: normal  Patellar Grind:  negative  J-Sign: none    Other   Meniscal Cyst: absent  Popliteal (Baker's) Cyst: absent  Sensation: normal    Left Knee Exam     Inspection   Erythema: absent  Scars: absent  Swelling: absent  Effusion: absent  Deformity: deformity  Bruising: absent    Tenderness   The patient is experiencing no tenderness.         Range of Motion   Extension: 0   Flexion: 120 (125)     Tests   Meniscus   Sonal:  Medial - negative Lateral - negative  Stability Lachman: normal (-1 to 2mm) PCL-Posterior Drawer: normal (0 to 2mm)  MCL - Valgus: normal (0 to 2mm)  LCL - Varus: normal (0 to 2mm)Pivot Shift: normal (Equal)Reverse Pivot Shift: normal (Equal)Dial Test at 30 degrees: normal (< 5 degrees)Dial Test at 90 degrees: normal (< 5 degrees)  Posterior Sag Test: negative  Posterolateral Corner: unstable (>15 degrees difference)  Patella   Patellar Apprehension: negative  Passive Patellar Tilt: neutral  Patellar Tracking: normal  Patellar Glide (Quadrants): Lateral - 1 Medial - 2  Q-Angle at 90 degrees: normal  Patellar Grind: negative  J-Sign: J sign absent    Other   Meniscal Cyst: absent  Popliteal (Baker's) Cyst: absent  Sensation: normal    Right Hip Exam     Tests   Julio Cesar: negative  Left Hip Exam     Tests   Julio Cesar: negative          Muscle Strength   Right Lower Extremity   Hip Abduction: 5/5   Quadriceps:  5/5   Hamstrin/5   Left Lower Extremity   Hip Abduction: 5/5   Quadriceps:  5/5   Hamstrin/5     Reflexes     Left Side  Quadriceps:  2+  Achilles:  2+    Right Side   Quadriceps:  2+  Achilles:  2+    Vascular Exam     Right Pulses  Dorsalis Pedis:      2+  Posterior Tibial:      2+        Left Pulses  Dorsalis Pedis:      2+  Posterior Tibial:      2+          RADIOGRAPHS TODAY  Findings:    There are bilateral total knee arthroplasties with posterior resurfacing of each patella.  RIGHT arthroplasty is constrained with long tibial and femoral components.  The prosthetic components appear in satisfactory position  and alignment.  I detect no complication of joint replacement.    I detect no fracture, dislocation, unusual radiopaque retained foreign body, lytic or blastic lesion.          Assessment:       Encounter Diagnoses   Name Primary?    Knee pain, unspecified chronicity, unspecified laterality Yes    History of total bilateral knee replacement     Chronic pain of right knee           Plan:       1. IKDC, SF-12 and KOOS was filled out today in clinic.     RTC in 6 weeks with Dr. Sri Roberts Patient will fill out IKDC, SF-12 and KOOS and bilateral knee series on return.    2. Continue PT and progress as tolerated                          [facility] patient questionnaires have been collected today.

## 2018-03-05 ENCOUNTER — HOSPITAL ENCOUNTER (OUTPATIENT)
Dept: RADIOLOGY | Facility: HOSPITAL | Age: 58
Discharge: HOME | End: 2018-03-05
Attending: ORTHOPAEDIC SURGERY
Payer: COMMERCIAL

## 2018-03-05 ENCOUNTER — OFFICE VISIT (OUTPATIENT)
Dept: SPORTS MEDICINE | Facility: CLINIC | Age: 58
End: 2018-03-05
Payer: COMMERCIAL

## 2018-03-05 VITALS
BODY MASS INDEX: 34.17 KG/M2 | WEIGHT: 181 LBS | HEIGHT: 61 IN | HEART RATE: 87 BPM | SYSTOLIC BLOOD PRESSURE: 126 MMHG | DIASTOLIC BLOOD PRESSURE: 86 MMHG

## 2018-03-05 DIAGNOSIS — M25.561 CHRONIC PAIN OF RIGHT KNEE: ICD-10-CM

## 2018-03-05 DIAGNOSIS — G89.29 CHRONIC PAIN OF RIGHT KNEE: ICD-10-CM

## 2018-03-05 DIAGNOSIS — M23.91 INTERNAL DERANGEMENT OF RIGHT KNEE: ICD-10-CM

## 2018-03-05 DIAGNOSIS — M25.561 RIGHT KNEE PAIN, UNSPECIFIED CHRONICITY: ICD-10-CM

## 2018-03-05 DIAGNOSIS — Z96.653 HISTORY OF TOTAL BILATERAL KNEE REPLACEMENT: ICD-10-CM

## 2018-03-05 DIAGNOSIS — M25.561 RIGHT KNEE PAIN, UNSPECIFIED CHRONICITY: Primary | ICD-10-CM

## 2018-03-05 DIAGNOSIS — M25.50 ARTHRALGIA, UNSPECIFIED JOINT: ICD-10-CM

## 2018-03-05 PROCEDURE — 99024 POSTOP FOLLOW-UP VISIT: CPT | Mod: S$GLB,,, | Performed by: ORTHOPAEDIC SURGERY

## 2018-03-05 PROCEDURE — 73564 X-RAY EXAM KNEE 4 OR MORE: CPT | Mod: 26,50,, | Performed by: RADIOLOGY

## 2018-03-05 PROCEDURE — 99999 PR PBB SHADOW E&M-EST. PATIENT-LVL IV: CPT | Mod: PBBFAC,,, | Performed by: ORTHOPAEDIC SURGERY

## 2018-03-05 PROCEDURE — 73564 X-RAY EXAM KNEE 4 OR MORE: CPT | Mod: TC,50,FY,PO

## 2018-03-05 RX ORDER — DICLOFENAC SODIUM 75 MG/1
75 TABLET, DELAYED RELEASE ORAL 2 TIMES DAILY
Qty: 60 TABLET | Refills: 0 | Status: SHIPPED | OUTPATIENT
Start: 2018-03-05 | End: 2018-04-24 | Stop reason: SDUPTHER

## 2018-04-25 RX ORDER — DICLOFENAC SODIUM 75 MG/1
TABLET, DELAYED RELEASE ORAL
Qty: 60 TABLET | Refills: 0 | Status: SHIPPED | OUTPATIENT
Start: 2018-04-25 | End: 2018-06-12 | Stop reason: SDUPTHER

## 2018-05-31 ENCOUNTER — PATIENT MESSAGE (OUTPATIENT)
Dept: DERMATOLOGY | Facility: CLINIC | Age: 58
End: 2018-05-31

## 2018-06-12 RX ORDER — DICLOFENAC SODIUM 75 MG/1
75 TABLET, DELAYED RELEASE ORAL 2 TIMES DAILY
Qty: 60 TABLET | Refills: 3 | Status: SHIPPED | OUTPATIENT
Start: 2018-06-12 | End: 2018-09-17 | Stop reason: SDUPTHER

## 2018-06-12 NOTE — TELEPHONE ENCOUNTER
----- Message from Anson Landaverde sent at 6/12/2018 10:44 AM CDT -----  Contact: Self/ [phone]   Patient would like a call back to ask a MA a few questions about her medication.

## 2018-06-18 ENCOUNTER — LAB VISIT (OUTPATIENT)
Dept: LAB | Facility: HOSPITAL | Age: 58
End: 2018-06-18
Attending: DERMATOLOGY
Payer: COMMERCIAL

## 2018-06-18 ENCOUNTER — OFFICE VISIT (OUTPATIENT)
Dept: DERMATOLOGY | Facility: CLINIC | Age: 58
End: 2018-06-18
Payer: COMMERCIAL

## 2018-06-18 DIAGNOSIS — L40.9 PSORIASIS: ICD-10-CM

## 2018-06-18 DIAGNOSIS — Z79.620 ADALIMUMAB (HUMIRA) LONG-TERM USE: ICD-10-CM

## 2018-06-18 DIAGNOSIS — Z79.620 ADALIMUMAB (HUMIRA) LONG-TERM USE: Primary | ICD-10-CM

## 2018-06-18 PROCEDURE — 86480 TB TEST CELL IMMUN MEASURE: CPT

## 2018-06-18 PROCEDURE — 99999 PR PBB SHADOW E&M-EST. PATIENT-LVL II: CPT | Mod: PBBFAC,,, | Performed by: DERMATOLOGY

## 2018-06-18 PROCEDURE — 99214 OFFICE O/P EST MOD 30 MIN: CPT | Mod: S$GLB,,, | Performed by: DERMATOLOGY

## 2018-06-18 PROCEDURE — 36415 COLL VENOUS BLD VENIPUNCTURE: CPT | Mod: PO

## 2018-06-18 NOTE — PROGRESS NOTES
Subjective:       Patient ID:  Carleen Kincaid is a 57 y.o. female who presents for   Chief Complaint   Patient presents with    Psoriasis     Pt presents for annual psoriasis check.  Flaring on scalp currently due to stopping humira for knee replacement sugrery 12-17.  Currently using clodan shampoo and taclonex suspension.  Helps   Pt also due for quant gold and skin check. Pt states she has 3 brothers with melanoma.        Psoriasis         Review of Systems   Constitutional: Negative for fever, chills, fatigue and malaise.   HENT: Negative for sore throat.    Eyes: Negative for visual change.   Respiratory: Negative for cough and shortness of breath.    Gastrointestinal: Negative for diarrhea.   Musculoskeletal: Negative for joint swelling and arthralgias.   Skin: Positive for itching and rash. Negative for abscesses.   Neurological: Negative for focal weakness, seizures and numbness.        Objective:    Physical Exam   Constitutional: She appears well-developed and well-nourished. No distress.   Neurological: She is alert and oriented to person, place, and time. She is not disoriented.   Psychiatric: She has a normal mood and affect.   Skin:   Areas Examined (abnormalities noted in diagram):   Scalp / Hair Palpated and Inspected  Head / Face Inspection Performed  Neck Inspection Performed  Chest / Axilla Inspection Performed  Abdomen Inspection Performed  Back Inspection Performed  RUE Inspected  LUE Inspection Performed  RLE Inspected  LLE Inspection Performed  Nails and Digits Inspection Performed                   Diagram Legend     Erythematous scaling macule/papule c/w actinic keratosis       Vascular papule c/w angioma      Pigmented verrucoid papule/plaque c/w seborrheic keratosis      Yellow umbilicated papule c/w sebaceous hyperplasia      Irregularly shaped tan macule c/w lentigo     1-2 mm smooth white papules consistent with Milia      Movable subcutaneous cyst with punctum c/w epidermal  inclusion cyst      Subcutaneous movable cyst c/w pilar cyst      Firm pink to brown papule c/w dermatofibroma      Pedunculated fleshy papule(s) c/w skin tag(s)      Evenly pigmented macule c/w junctional nevus     Mildly variegated pigmented, slightly irregular-bordered macule c/w mildly atypical nevus      Flesh colored to evenly pigmented papule c/w intradermal nevus       Pink pearly papule/plaque c/w basal cell carcinoma      Erythematous hyperkeratotic cursted plaque c/w SCC      Surgical scar with no sign of skin cancer recurrence      Open and closed comedones      Inflammatory papules and pustules      Verrucoid papule consistent consistent with wart     Erythematous eczematous patches and plaques     Dystrophic onycholytic nail with subungual debris c/w onychomycosis     Umbilicated papule    Erythematous-base heme-crusted tan verrucoid plaque consistent with inflamed seborrheic keratosis     Erythematous Silvery Scaling Plaque c/w Psoriasis     See annotation      Assessment / Plan:        Adalimumab (Humira) long-term use  -     Psoriasis  -     Quantiferon Gold TB; Future  -     Comprehensive metabolic panel; Future  -     Hepatitis C antibody; Future  -     Hepatitis B core antibody, total; Future  -     Hepatitis B surface antibody; Future  -     Hepatitis B surface antigen; Future  -     adalimumab (HUMIRA PEN) PnKt injection; Inject 1 pen SQ qoweek  Dispense: 1.6 mL; Refill: 2    Will recheck Hep status and plan for Hep B vaccine if non immune  Check quant gold  Last CBC dec 2017 wnl.   Continue taclonex for spot tx and clodan shampoo   Pt s/p knee surgery and healed well off humira  Will resume humira             Follow-up in about 3 months (around 9/18/2018) for TBSE- fam hx melanoma.

## 2018-06-20 ENCOUNTER — PATIENT MESSAGE (OUTPATIENT)
Dept: DERMATOLOGY | Facility: CLINIC | Age: 58
End: 2018-06-20

## 2018-06-20 ENCOUNTER — TELEPHONE (OUTPATIENT)
Dept: DERMATOLOGY | Facility: CLINIC | Age: 58
End: 2018-06-20

## 2018-06-20 LAB
MITOGEN NIL: 8.45 IU/ML
NIL: 0.04 IU/ML
TB ANTIGEN NIL: 0 IU/ML
TB ANTIGEN: 0.04 IU/ML
TB GOLD: NEGATIVE

## 2018-06-20 NOTE — TELEPHONE ENCOUNTER
----- Message from Essence Martinez sent at 6/20/2018 11:51 AM CDT -----  Contact: [facility] at [phone] rf #[id number]  Fanny aguillon-t Humira 40mg qty 2pens for 28 days

## 2018-06-21 RX ORDER — CALCIPOTRIENE AND BETAMETHASONE DIPROPIONATE 50; .5 UG/G; MG/G
AEROSOL, FOAM TOPICAL
Qty: 60 G | Refills: 3 | Status: SHIPPED | OUTPATIENT
Start: 2018-06-21 | End: 2022-04-01

## 2018-07-09 ENCOUNTER — PATIENT MESSAGE (OUTPATIENT)
Dept: SPORTS MEDICINE | Facility: CLINIC | Age: 58
End: 2018-07-09

## 2018-07-11 ENCOUNTER — TELEPHONE (OUTPATIENT)
Dept: DERMATOLOGY | Facility: CLINIC | Age: 58
End: 2018-07-11

## 2018-07-11 NOTE — TELEPHONE ENCOUNTER
----- Message from Ania Escobedo MD sent at 7/2/2018  4:08 PM CDT -----  Please call pt and let her know we would like her to see ID to get her hep B immunizations since she in on a biologic. Is she ok with us scheduling? If so please make appt with dr kathy baumgarten . thanks

## 2018-09-17 ENCOUNTER — OFFICE VISIT (OUTPATIENT)
Dept: DERMATOLOGY | Facility: CLINIC | Age: 58
End: 2018-09-17
Payer: COMMERCIAL

## 2018-09-17 ENCOUNTER — OFFICE VISIT (OUTPATIENT)
Dept: SPORTS MEDICINE | Facility: CLINIC | Age: 58
End: 2018-09-17
Payer: COMMERCIAL

## 2018-09-17 ENCOUNTER — HOSPITAL ENCOUNTER (OUTPATIENT)
Dept: RADIOLOGY | Facility: HOSPITAL | Age: 58
Discharge: HOME | End: 2018-09-17
Attending: ORTHOPAEDIC SURGERY
Payer: COMMERCIAL

## 2018-09-17 VITALS
DIASTOLIC BLOOD PRESSURE: 105 MMHG | SYSTOLIC BLOOD PRESSURE: 153 MMHG | WEIGHT: 181 LBS | HEART RATE: 87 BPM | BODY MASS INDEX: 34.17 KG/M2 | HEIGHT: 61 IN

## 2018-09-17 DIAGNOSIS — L82.0 INFLAMED SEBORRHEIC KERATOSIS: ICD-10-CM

## 2018-09-17 DIAGNOSIS — Z80.8 FAMILY HX OF MELANOMA: ICD-10-CM

## 2018-09-17 DIAGNOSIS — M25.561 RIGHT KNEE PAIN, UNSPECIFIED CHRONICITY: ICD-10-CM

## 2018-09-17 DIAGNOSIS — Z96.653 HISTORY OF TOTAL BILATERAL KNEE REPLACEMENT: ICD-10-CM

## 2018-09-17 DIAGNOSIS — L40.9 PSORIASIS: ICD-10-CM

## 2018-09-17 DIAGNOSIS — M25.569 KNEE PAIN, UNSPECIFIED CHRONICITY, UNSPECIFIED LATERALITY: ICD-10-CM

## 2018-09-17 DIAGNOSIS — Z79.620 ADALIMUMAB (HUMIRA) LONG-TERM USE: Primary | ICD-10-CM

## 2018-09-17 DIAGNOSIS — D23.9 BLUE NEVUS: ICD-10-CM

## 2018-09-17 DIAGNOSIS — M25.561 RIGHT KNEE PAIN, UNSPECIFIED CHRONICITY: Primary | ICD-10-CM

## 2018-09-17 DIAGNOSIS — L81.4 LENTIGO: ICD-10-CM

## 2018-09-17 DIAGNOSIS — D18.00 ANGIOMA: ICD-10-CM

## 2018-09-17 DIAGNOSIS — L73.8 SEBACEOUS GLAND HYPERPLASIA: ICD-10-CM

## 2018-09-17 DIAGNOSIS — L82.1 SK (SEBORRHEIC KERATOSIS): ICD-10-CM

## 2018-09-17 PROCEDURE — 3008F BODY MASS INDEX DOCD: CPT | Mod: CPTII,S$GLB,, | Performed by: ORTHOPAEDIC SURGERY

## 2018-09-17 PROCEDURE — 99214 OFFICE O/P EST MOD 30 MIN: CPT | Mod: S$GLB,,, | Performed by: DERMATOLOGY

## 2018-09-17 PROCEDURE — 99214 OFFICE O/P EST MOD 30 MIN: CPT | Mod: S$GLB,,, | Performed by: ORTHOPAEDIC SURGERY

## 2018-09-17 PROCEDURE — 73564 X-RAY EXAM KNEE 4 OR MORE: CPT | Mod: 26,50,, | Performed by: RADIOLOGY

## 2018-09-17 PROCEDURE — 73564 X-RAY EXAM KNEE 4 OR MORE: CPT | Mod: TC,50,FY,PO

## 2018-09-17 PROCEDURE — 99999 PR PBB SHADOW E&M-EST. PATIENT-LVL IV: CPT | Mod: PBBFAC,,, | Performed by: ORTHOPAEDIC SURGERY

## 2018-09-17 PROCEDURE — 99999 PR PBB SHADOW E&M-EST. PATIENT-LVL II: CPT | Mod: PBBFAC,,, | Performed by: DERMATOLOGY

## 2018-09-17 RX ORDER — CLONAZEPAM 0.5 MG/1
TABLET ORAL
Refills: 0 | COMMUNITY
Start: 2018-07-02 | End: 2022-03-31

## 2018-09-17 RX ORDER — DICLOFENAC SODIUM AND MISOPROSTOL 75; 200 MG/1; UG/1
1 TABLET, DELAYED RELEASE ORAL 2 TIMES DAILY
Qty: 60 TABLET | Refills: 11 | Status: CANCELLED | OUTPATIENT
Start: 2018-09-17 | End: 2019-09-17

## 2018-09-17 RX ORDER — DICLOFENAC SODIUM 75 MG/1
75 TABLET, DELAYED RELEASE ORAL 2 TIMES DAILY
Qty: 60 TABLET | Refills: 11 | Status: SHIPPED | OUTPATIENT
Start: 2018-09-17 | End: 2019-09-28 | Stop reason: SDUPTHER

## 2018-09-17 NOTE — PROGRESS NOTES
Subjective:       Patient ID:  Carleen Kincaid is a 57 y.o. female who presents for   Chief Complaint   Patient presents with    Skin Check    Psoriasis     Pt presents for 3 month humira fu.  Better.  No problems.  Spot tx with Taclonex, enstilar foam, clodan shampoo.    Also needs tbse given strong fam hx Melanoma  Pt has uses diligent sun protection her whole life. Denies any new or changing moles today       Psoriasis         Review of Systems   Constitutional: Negative for fever, chills, fatigue and malaise.   Eyes: Negative for visual change.   Respiratory: Negative for cough and shortness of breath.    Musculoskeletal: Positive for arthralgias. Negative for joint swelling.        3 knee replacements and spinal fusion   Skin: Positive for daily sunscreen use. Negative for abscesses.   Neurological: Negative for focal weakness, seizures and numbness.        Objective:    Physical Exam   Constitutional: She appears well-developed and well-nourished. No distress.   Neurological: She is alert and oriented to person, place, and time. She is not disoriented.   Psychiatric: She has a normal mood and affect.   Skin:   Areas Examined (abnormalities noted in diagram):   Scalp / Hair Palpated and Inspected  Head / Face Inspection Performed  Neck Inspection Performed  Chest / Axilla Inspection Performed  Abdomen Inspection Performed  Genitals / Buttocks / Groin Inspection Performed  Back Inspection Performed  RUE Inspected  LUE Inspection Performed  RLE Inspected  LLE Inspection Performed  Nails and Digits Inspection Performed                       Diagram Legend     Erythematous scaling macule/papule c/w actinic keratosis       Vascular papule c/w angioma      Pigmented verrucoid papule/plaque c/w seborrheic keratosis      Yellow umbilicated papule c/w sebaceous hyperplasia      Irregularly shaped tan macule c/w lentigo     1-2 mm smooth white papules consistent with Milia      Movable subcutaneous cyst with punctum  c/w epidermal inclusion cyst      Subcutaneous movable cyst c/w pilar cyst      Firm pink to brown papule c/w dermatofibroma      Pedunculated fleshy papule(s) c/w skin tag(s)      Evenly pigmented macule c/w junctional nevus     Mildly variegated pigmented, slightly irregular-bordered macule c/w mildly atypical nevus      Flesh colored to evenly pigmented papule c/w intradermal nevus       Pink pearly papule/plaque c/w basal cell carcinoma      Erythematous hyperkeratotic cursted plaque c/w SCC      Surgical scar with no sign of skin cancer recurrence      Open and closed comedones      Inflammatory papules and pustules      Verrucoid papule consistent consistent with wart     Erythematous eczematous patches and plaques     Dystrophic onycholytic nail with subungual debris c/w onychomycosis     Umbilicated papule    Erythematous-base heme-crusted tan verrucoid plaque consistent with inflamed seborrheic keratosis     Erythematous Silvery Scaling Plaque c/w Psoriasis     See annotation      Assessment / Plan:        Adalimumab (Humira) long-term use  Psoriasis  -     CBC auto differential; Future  -     Comprehensive metabolic panel; Future  Continue humira 40 mg sq qow  Check labs in 6mo  Last quant gold 6/2018  Continue clodan shampoo and enstilar prn     Angioma  These are benign vascular lesions that are inherited.  Treatment is not necessary.    Lentigo  This is a benign hyperpigmented sun induced lesion. Daily sun protection will reduce the number of new lesions. Treatment of these benign lesions are considered cosmetic.  The nature of sun-induced photo-aging and skin cancers is discussed.  Sun avoidance, protective clothing, and the use of 30-SPF sunscreens is advised. Observe closely for skin damage/changes, and call if such occurs.    Blue nevus  Reassurance given to patient. No treatment is necessary.   Treatment of benign, asymptomatic lesions may be considered cosmetic.    SK (seborrheic keratosis)  These  are benign inherited growths without a malignant potential. Reassurance given to patient. No treatment is necessary.     Family hx of melanoma    Total body skin examination performed today including at least 12 points as noted in physical examination. No lesions suspicious for malignancy noted.    Inflamed seborrheic keratosis  Sebaceous gland hyperplasia  Reassurance given to patient. No treatment is necessary.   Treatment of benign, asymptomatic lesions may be considered cosmetic.             Follow-up in about 6 months (around 3/17/2019).

## 2018-09-17 NOTE — PROGRESS NOTES
Subjective:          Chief Complaint: Carleen Kincaid is a 57 y.o. female who had concerns including Pain of the Right Knee.    Patient is here for a follow up for her right knee. She is doing well. She complete chair exercises and rides her bike regularly. She is currently taking diclofenac.        DATE OF PROCEDURE:  12/12/2017     ATTENDING SURGEON:  Sri Roberts M.D.     CO-SURGEON:  Santiago Qiu M.D.     FIRST ASSISTANT:  Kayla Patel [facility]-assistant.     SECOND ASSISTANT:  Jossue Irene M.D.     PREOPERATIVE DIAGNOSIS:  Failed right total knee replacement.     POSTOPERATIVE DIAGNOSIS:  Failed right total knee replacement.     PROCEDURES PERFORMED:  Right complex revision total knee replacement.        Pain   Associated symptoms include joint swelling. Pertinent negatives include no chest pain, fever, numbness or rash.       Review of Systems   Constitution: Negative for fever and night sweats.   HENT: Negative for hearing loss.    Eyes: Negative for blurred vision and visual disturbance.   Cardiovascular: Negative for chest pain and leg swelling.   Respiratory: Negative for shortness of breath.    Endocrine: Negative for polyuria.   Hematologic/Lymphatic: Negative for bleeding problem.   Skin: Negative for rash.   Musculoskeletal: Positive for joint pain and joint swelling. Negative for back pain, muscle cramps and muscle weakness.   Gastrointestinal: Negative for melena.   Genitourinary: Negative for hematuria.   Neurological: Negative for loss of balance, numbness and paresthesias.   Psychiatric/Behavioral: Negative for altered mental status.       Pain Related Questions  Over the past 3 days, what was your average pain during activity? (I.e. running, jogging, walking, climbing stairs, getting dressed, ect.): 0  Over the past 3 days, what was your highest pain level?: 0  Over the past 3 days, what was your lowest pain level? : 0    Other  How many nights a week are you awakened by your affected body  part?: 0      Objective:        General: Carleen is well-developed, well-nourished, appears stated age, in no acute distress, alert and oriented to time, place and person.     General    Vitals reviewed.  Constitutional: She is oriented to person, place, and time. She appears well-developed and well-nourished. No distress.   HENT:   Mouth/Throat: No oropharyngeal exudate.   Eyes: Right eye exhibits no discharge. Left eye exhibits no discharge.   Neck: Normal range of motion.   Pulmonary/Chest: Effort normal and breath sounds normal. No respiratory distress.   Neurological: She is alert and oriented to person, place, and time. She has normal reflexes. No cranial nerve deficit. Coordination abnormal.   Psychiatric: She has a normal mood and affect. Her behavior is normal. Judgment and thought content normal.     General Musculoskeletal Exam   Gait: normal       Right Knee Exam     Inspection   Erythema: absent  Scars: present  Swelling: absent  Effusion: effusion  Deformity: deformity  Bruising: absent    Tenderness   The patient is experiencing no tenderness.     Range of Motion   Extension: 0   Flexion: 130     Tests   Meniscus   Sonal:  Medial - negative Lateral - negative  Ligament Examination Lachman: normal (-1 to 2mm) PCL-Posterior Drawer: normal (0 to 2mm)     MCL - Valgus: normal (0 to 2mm)  LCL - Varus: normalPivot Shift: normal (Equal)Reverse Pivot Shift: normal (Equal)Dial Test at 30 degrees: normal (< 5 degrees)Dial Test at 90 degrees: normal (< 5 degrees)  Posterior Sag Test: negative  Posterolateral Corner: unstable (>15 degrees difference)  Patella   Patellar apprehension: negative  Passive Patellar Tilt: neutral  Patellar Tracking: normal  Patellar Glide (quadrants): Lateral - 1   Medial - 2  Q-Angle at 90 degrees: normal  Patellar Grind: negative  J-Sign: none    Other   Meniscal Cyst: absent  Popliteal (Baker's) Cyst: absent  Sensation: normal    Left Knee Exam     Inspection   Erythema:  absent  Scars: present  Swelling: absent  Effusion: absent  Deformity: deformity  Bruising: absent    Tenderness   The patient is experiencing no tenderness.     Range of Motion   Extension: 0   Flexion: 130     Tests   Meniscus   Sonal:  Medial - negative Lateral - negative  Stability Lachman: normal (-1 to 2mm) PCL-Posterior Drawer: normal (0 to 2mm)  MCL - Valgus: normal (0 to 2mm)  LCL - Varus: normal (0 to 2mm)Pivot Shift: normal (Equal)Reverse Pivot Shift: normal (Equal)Dial Test at 30 degrees: normal (< 5 degrees)Dial Test at 90 degrees: normal (< 5 degrees)  Posterior Sag Test: negative  Posterolateral Corner: unstable (>15 degrees difference)  Patella   Patellar apprehension: negative  Passive Patellar Tilt: neutral  Patellar Tracking: normal  Patellar Glide (Quadrants): Lateral - 1 Medial - 2  Q-Angle at 90 degrees: normal  Patellar Grind: negative  J-Sign: J sign absent    Other   Meniscal Cyst: absent  Popliteal (Baker's) Cyst: absent  Sensation: normal    Right Hip Exam     Tests   Julio Cesar: negative  Left Hip Exam     Tests   Julio Cesar: negative          Muscle Strength   Right Lower Extremity   Hip Abduction: 5/5   Quadriceps:  5/5   Hamstrin/5   Left Lower Extremity   Hip Abduction: 5/5   Quadriceps:  5/5   Hamstrin/5     Reflexes     Left Side  Quadriceps:  2+  Achilles:  2+    Right Side   Quadriceps:  2+  Achilles:  2+    Vascular Exam     Right Pulses  Dorsalis Pedis:      2+  Posterior Tibial:      2+        Left Pulses  Dorsalis Pedis:      2+  Posterior Tibial:      2+          RADIOGRAPHS TODAY  Right: There is a TKA in place alignment no complication.    Left: There is a TKA in place good alignment no complication.          Assessment:       Encounter Diagnoses   Name Primary?    Right knee pain, unspecified chronicity Yes    History of total bilateral knee replacement     Knee pain, unspecified chronicity, unspecified laterality           Plan:       1. IKDC, SF-12 and KOOS was  filled out today in clinic.     RTC in 1 years with Dr. Sri Roberts Patient will fill out IKDC, SF-12 and KOOS and bilateral knee series on return.    2. Continue HEP and activities as tolerated    3. Refill of Diclofenac provided today                        [facility] patient questionnaires have been collected today.

## 2018-09-21 DIAGNOSIS — L02.92 FURUNCLE: ICD-10-CM

## 2018-09-24 RX ORDER — MUPIROCIN 20 MG/G
OINTMENT TOPICAL
Qty: 22 G | Refills: 0 | Status: SHIPPED | OUTPATIENT
Start: 2018-09-24 | End: 2018-10-29 | Stop reason: SDUPTHER

## 2018-10-22 DIAGNOSIS — Z79.620 ADALIMUMAB (HUMIRA) LONG-TERM USE: ICD-10-CM

## 2018-10-22 DIAGNOSIS — L40.9 PSORIASIS: ICD-10-CM

## 2018-10-22 RX ORDER — ADALIMUMAB 40MG/0.8ML
KIT SUBCUTANEOUS
Qty: 2 PEN | Refills: 6 | Status: SHIPPED | OUTPATIENT
Start: 2018-10-22 | End: 2019-09-21 | Stop reason: SDUPTHER

## 2018-10-26 ENCOUNTER — TELEPHONE (OUTPATIENT)
Dept: DERMATOLOGY | Facility: CLINIC | Age: 58
End: 2018-10-26

## 2018-10-29 DIAGNOSIS — L02.92 FURUNCLE: ICD-10-CM

## 2018-10-29 RX ORDER — MUPIROCIN 20 MG/G
OINTMENT TOPICAL
Qty: 22 G | Refills: 0 | Status: SHIPPED | OUTPATIENT
Start: 2018-10-29 | End: 2018-11-25 | Stop reason: SDUPTHER

## 2018-11-25 DIAGNOSIS — L02.92 FURUNCLE: ICD-10-CM

## 2018-11-26 RX ORDER — MUPIROCIN 20 MG/G
OINTMENT TOPICAL
Qty: 22 G | Refills: 0 | Status: SHIPPED | OUTPATIENT
Start: 2018-11-26 | End: 2019-08-29 | Stop reason: SDUPTHER

## 2019-01-15 ENCOUNTER — PATIENT MESSAGE (OUTPATIENT)
Dept: DERMATOLOGY | Facility: CLINIC | Age: 59
End: 2019-01-15

## 2019-01-15 DIAGNOSIS — Z23 NEED FOR HEPATITIS B VACCINATION: Primary | ICD-10-CM

## 2019-01-19 ENCOUNTER — PATIENT MESSAGE (OUTPATIENT)
Dept: DERMATOLOGY | Facility: CLINIC | Age: 59
End: 2019-01-19

## 2019-04-22 ENCOUNTER — PATIENT MESSAGE (OUTPATIENT)
Dept: SPORTS MEDICINE | Facility: CLINIC | Age: 59
End: 2019-04-22

## 2019-04-24 DIAGNOSIS — M62.838 MUSCLE SPASM: ICD-10-CM

## 2019-04-24 DIAGNOSIS — Z96.653 HISTORY OF TOTAL BILATERAL KNEE REPLACEMENT: ICD-10-CM

## 2019-04-24 DIAGNOSIS — Z96.653 H/O TOTAL KNEE REPLACEMENT, BILATERAL: Primary | ICD-10-CM

## 2019-04-24 RX ORDER — CELECOXIB 200 MG/1
200 CAPSULE ORAL 2 TIMES DAILY
Qty: 60 CAPSULE | Refills: 6 | Status: SHIPPED | OUTPATIENT
Start: 2019-04-24 | End: 2022-03-27

## 2019-04-24 RX ORDER — CYCLOBENZAPRINE HCL 5 MG
5 TABLET ORAL DAILY PRN
Qty: 30 TABLET | Refills: 0 | Status: SHIPPED | OUTPATIENT
Start: 2019-04-24 | End: 2022-04-01

## 2019-05-07 ENCOUNTER — TELEPHONE (OUTPATIENT)
Dept: SPORTS MEDICINE | Facility: CLINIC | Age: 59
End: 2019-05-07

## 2019-05-07 NOTE — TELEPHONE ENCOUNTER
Left telephone message regarding appointment cancellation 5/15/19 with . Asked that the patient return call to reschedule with Dr. Roberts.

## 2019-08-27 NOTE — PROGRESS NOTES
Subjective:          Chief Complaint: Carleen Kincaid is a 58 y.o. female who had no chief complaint listed for this encounter.    Patient is here for a follow up for her right knee. She is doing well. She complete chair exercises and rides her bike regularly. She is currently taking diclofenac. She does have concerns about her lower back pain, lumbar spine pain and glute/hip pain. The pain began 4-5 weeks ago. She under went a lumbar spine fusion 4 years ago in [place], [place]. The fusion was done at         DATE OF PROCEDURE:  12/12/2017     ATTENDING SURGEON:  Sri Roberts M.D.     CO-SURGEON:  Santiago Qiu M.D.     FIRST ASSISTANT:  Kayla Patel [facility]-assistant.     SECOND ASSISTANT:  Jossue Irene M.D.     PREOPERATIVE DIAGNOSIS:  Failed right total knee replacement.     POSTOPERATIVE DIAGNOSIS:  Failed right total knee replacement.     PROCEDURES PERFORMED:  Right complex revision total knee replacement.      Pain   Associated symptoms include joint swelling. Pertinent negatives include no chest pain, fever, numbness or rash.       Review of Systems   Constitution: Negative for fever and night sweats.   HENT: Negative for hearing loss.    Eyes: Negative for blurred vision and visual disturbance.   Cardiovascular: Negative for chest pain and leg swelling.   Respiratory: Negative for shortness of breath.    Endocrine: Negative for polyuria.   Hematologic/Lymphatic: Negative for bleeding problem.   Skin: Negative for rash.   Musculoskeletal: Positive for joint pain and joint swelling. Negative for back pain, muscle cramps and muscle weakness.   Gastrointestinal: Negative for melena.   Genitourinary: Negative for hematuria.   Neurological: Negative for loss of balance, numbness and paresthesias.   Psychiatric/Behavioral: Negative for altered mental status.                   Objective:        General: Carleen is well-developed, well-nourished, appears stated age, in no acute distress, alert and oriented to time,  place and person.     General    Vitals reviewed.  Constitutional: She is oriented to person, place, and time. She appears well-developed and well-nourished. No distress.   HENT:   Mouth/Throat: No oropharyngeal exudate.   Eyes: Right eye exhibits no discharge. Left eye exhibits no discharge.   Neck: Normal range of motion.   Cardiovascular: Normal rate.    Pulmonary/Chest: Effort normal and breath sounds normal. No respiratory distress.   Neurological: She is alert and oriented to person, place, and time. She has normal reflexes. No cranial nerve deficit. Coordination abnormal.   Psychiatric: She has a normal mood and affect. Her behavior is normal. Judgment and thought content normal.     General Musculoskeletal Exam   Gait: normal   Pelvic Obliquity: none      Right Knee Exam     Inspection   Alignment:  normal  Erythema: absent  Scars: present  Swelling: absent  Effusion: absent  Deformity: absent  Bruising: absent    Tenderness   The patient is experiencing no tenderness.     Range of Motion   Extension: 0   Right knee flexion: 125.     Tests   Meniscus   Sonal:  Medial - negative Lateral - negative  Ligament Examination Lachman: normal (-1 to 2mm) PCL-Posterior Drawer: normal (0 to 2mm)     MCL - Valgus: normal (0 to 2mm)  LCL - Varus: normalPivot Shift: normal (Equal)Reverse Pivot Shift: normal (Equal)Dial Test at 30 degrees: normal (< 5 degrees)Dial Test at 90 degrees: normal (< 5 degrees)  Posterior Sag Test: negative  Posterolateral Corner: unstable (>15 degrees difference)  Patella   Patellar apprehension: negative  Passive Patellar Tilt: neutral  Patellar Tracking: normal  Patellar Glide (quadrants): Lateral - 1   Medial - 2  Q-Angle at 90 degrees: normal  Patellar Grind: negative  J-Sign: none    Other   Meniscal Cyst: absent  Popliteal (Baker's) Cyst: absent  Sensation: normal    Left Knee Exam     Inspection   Alignment:  normal  Erythema: absent  Scars: present  Swelling: absent  Effusion:  absent  Deformity: absent  Bruising: absent    Tenderness   The patient is experiencing no tenderness.     Range of Motion   Extension: 0   Flexion: 130     Tests   Meniscus   Sonal:  Medial - negative Lateral - negative  Stability Lachman: normal (-1 to 2mm) PCL-Posterior Drawer: normal (0 to 2mm)  MCL - Valgus: normal (0 to 2mm)  LCL - Varus: normal (0 to 2mm)Pivot Shift: normal (Equal)Reverse Pivot Shift: normal (Equal)Dial Test at 30 degrees: normal (< 5 degrees)Dial Test at 90 degrees: normal (< 5 degrees)  Posterior Sag Test: negative  Posterolateral Corner: unstable (>15 degrees difference)  Patella   Patellar apprehension: negative  Passive Patellar Tilt: neutral  Patellar Tracking: normal  Patellar Glide (Quadrants): Lateral - 1 Medial - 2  Q-Angle at 90 degrees: normal  Patellar Grind: negative  J-Sign: J sign absent    Other   Meniscal Cyst: absent  Popliteal (Baker's) Cyst: absent  Sensation: normal    Right Hip Exam     Inspection   Scars: absent  Swelling: absent  Bruising: absent  No deformity of hip.  Quadriceps Atrophy:  Negative  Erythema: absent    Range of Motion   Abduction:  40 normal   Adduction:  20 normal   Extension:  0 normal   Flexion:  120 normal   External rotation:  60 normal   Internal rotation:  15 normal     Tests   Pain w/ forced internal rotation (AIDEN): absent  Pain w/ forced external rotation (FADIR): absent  Julio Cesar: negative  Trendelenburg Test: negative  Circumduction test: negative  Stinchfield test: negative  Log Roll: negative  Snapping Hip (internal): negative  Step-down test: negative  Resisted sit-up pain: negative  Unresisted sit-up pain: negative  Resisted sit-up pain with adductor contraction pain: negative    Other   Sensation: normal  Left Hip Exam     Inspection   Scars: absent  Swelling: absent  No deformity of hip.  Quadriceps Atrophy:  negative  Erythema: absent  Bruising: absent    Range of Motion   Abduction:  40 normal   Adduction:  20 normal   Extension:   0 normal   Flexion:  120 normal   External rotation:  60 normal   Internal rotation: 15 normal     Tests   Pain w/ forced internal rotation (AIDEN): absent  Pain w/ forced external rotation (FADIR): absent  Julio Cesar: negative  Trendelenburg Test: negative  Circumduction test: negative  Stinchfield test: negative  Log Roll: negative  Snapping Hip (internal): negative  Step-down test: negative  Resisted sit-up pain: negative  Resisted sit-up pain with adductor contraction pain: negative  Unresisted sit-up pain: negative    Other   Sensation: normal      Back (L-Spine & T-Spine) / Neck (C-Spine) Exam   Back exam is normal.    Back (L-Spine & T-Spine) Tests   Right Side Tests  Straight leg raise: + at 70 deg               Muscle Strength   Right Lower Extremity   Hip Abduction: 5/5   Hip Adduction: 5/5   Hip Flexion: 5/5   Quadriceps:  5/5   Hamstrin/5   Left Lower Extremity   Hip Abduction: 5/5   Hip Adduction: 5/5   Hip Flexion: 5/5   Quadriceps:  5/5   Hamstrin/5     Reflexes     Left Side  Quadriceps:  2+  Achilles:  2+    Right Side   Quadriceps:  2+  Achilles:  2+    Vascular Exam     Right Pulses  Dorsalis Pedis:      2+  Posterior Tibial:      2+        Left Pulses  Dorsalis Pedis:      2+  Posterior Tibial:      2+        Capillary Refill  Right Hand: normal capillary refill  Left Hand: normal capillary refill    Edema  Right Upper Leg: absent  Left Upper Leg: absent    RADIOGRAPHS TODAY  Right: There is a TKA in place alignment no complication.    Left: There is a TKA in place good alignment no complication.          Assessment:       No diagnosis found.       Plan:       1. IKDC, SF-12 and KOOS was filled out today in clinic.     RTC in 1 years with Sri Roberts or Mid-level provider Patient will fill out IKDC, SF-12 and KOOS and bilateral knee series on return.    2. Continue HEP and activities as tolerated    3. Refill of Diclofenac provided today    4. Referral to Dr. Bartlett's Team for CT Results  Review    5. CT Scan - Lumbar Spine                      [facility] patient questionnaires have been collected today.

## 2019-08-28 ENCOUNTER — OFFICE VISIT (OUTPATIENT)
Dept: SPORTS MEDICINE | Facility: CLINIC | Age: 59
End: 2019-08-28
Payer: COMMERCIAL

## 2019-08-28 ENCOUNTER — HOSPITAL ENCOUNTER (OUTPATIENT)
Dept: RADIOLOGY | Facility: HOSPITAL | Age: 59
Discharge: HOME | End: 2019-08-28
Attending: ORTHOPAEDIC SURGERY
Payer: COMMERCIAL

## 2019-08-28 VITALS
SYSTOLIC BLOOD PRESSURE: 143 MMHG | HEART RATE: 100 BPM | BODY MASS INDEX: 33.13 KG/M2 | HEIGHT: 62 IN | DIASTOLIC BLOOD PRESSURE: 87 MMHG | WEIGHT: 180 LBS

## 2019-08-28 DIAGNOSIS — M54.40 ACUTE RIGHT-SIDED LOW BACK PAIN WITH SCIATICA, SCIATICA LATERALITY UNSPECIFIED: ICD-10-CM

## 2019-08-28 DIAGNOSIS — M25.561 PAIN IN BOTH KNEES, UNSPECIFIED CHRONICITY: ICD-10-CM

## 2019-08-28 DIAGNOSIS — M25.562 PAIN IN BOTH KNEES, UNSPECIFIED CHRONICITY: ICD-10-CM

## 2019-08-28 DIAGNOSIS — M25.562 PAIN IN BOTH KNEES, UNSPECIFIED CHRONICITY: Primary | ICD-10-CM

## 2019-08-28 DIAGNOSIS — M25.561 PAIN IN BOTH KNEES, UNSPECIFIED CHRONICITY: Primary | ICD-10-CM

## 2019-08-28 DIAGNOSIS — Z98.1 S/P LUMBAR SPINAL FUSION: ICD-10-CM

## 2019-08-28 PROCEDURE — 73564 X-RAY EXAM KNEE 4 OR MORE: CPT | Mod: 26,LT,, | Performed by: RADIOLOGY

## 2019-08-28 PROCEDURE — 73564 XR KNEE ORTHO BILAT WITH FLEXION: ICD-10-PCS | Mod: 26,RT,, | Performed by: RADIOLOGY

## 2019-08-28 PROCEDURE — 73564 X-RAY EXAM KNEE 4 OR MORE: CPT | Mod: TC,50,FY,PO

## 2019-08-28 PROCEDURE — 99999 PR PBB SHADOW E&M-EST. PATIENT-LVL IV: CPT | Mod: PBBFAC,,, | Performed by: ORTHOPAEDIC SURGERY

## 2019-08-28 PROCEDURE — 3008F PR BODY MASS INDEX (BMI) DOCUMENTED: ICD-10-PCS | Mod: CPTII,S$GLB,, | Performed by: ORTHOPAEDIC SURGERY

## 2019-08-28 PROCEDURE — 99999 PR PBB SHADOW E&M-EST. PATIENT-LVL IV: ICD-10-PCS | Mod: PBBFAC,,, | Performed by: ORTHOPAEDIC SURGERY

## 2019-08-28 PROCEDURE — 73564 X-RAY EXAM KNEE 4 OR MORE: CPT | Mod: 26,RT,, | Performed by: RADIOLOGY

## 2019-08-28 PROCEDURE — 99214 OFFICE O/P EST MOD 30 MIN: CPT | Mod: S$GLB,,, | Performed by: ORTHOPAEDIC SURGERY

## 2019-08-28 PROCEDURE — 72100 X-RAY EXAM L-S SPINE 2/3 VWS: CPT | Mod: 26,,, | Performed by: RADIOLOGY

## 2019-08-28 PROCEDURE — 3008F BODY MASS INDEX DOCD: CPT | Mod: CPTII,S$GLB,, | Performed by: ORTHOPAEDIC SURGERY

## 2019-08-28 PROCEDURE — 99214 PR OFFICE/OUTPT VISIT, EST, LEVL IV, 30-39 MIN: ICD-10-PCS | Mod: S$GLB,,, | Performed by: ORTHOPAEDIC SURGERY

## 2019-08-28 PROCEDURE — 72100 X-RAY EXAM L-S SPINE 2/3 VWS: CPT | Mod: TC,FY,PO

## 2019-08-28 PROCEDURE — 72100 XR LUMBAR SPINE 2 OR 3 VIEWS: ICD-10-PCS | Mod: 26,,, | Performed by: RADIOLOGY

## 2019-08-28 RX ORDER — SERTRALINE HYDROCHLORIDE 50 MG/1
TABLET, FILM COATED ORAL
Refills: 2 | COMMUNITY
Start: 2019-08-21 | End: 2022-03-24

## 2019-08-29 ENCOUNTER — LAB VISIT (OUTPATIENT)
Dept: LAB | Facility: HOSPITAL | Age: 59
End: 2019-08-29
Attending: DERMATOLOGY
Payer: COMMERCIAL

## 2019-08-29 ENCOUNTER — OFFICE VISIT (OUTPATIENT)
Dept: DERMATOLOGY | Facility: CLINIC | Age: 59
End: 2019-08-29
Payer: COMMERCIAL

## 2019-08-29 DIAGNOSIS — D48.5 NEOPLASM OF UNCERTAIN BEHAVIOR OF SKIN: ICD-10-CM

## 2019-08-29 DIAGNOSIS — L81.4 LENTIGO: ICD-10-CM

## 2019-08-29 DIAGNOSIS — Z79.899 LONG-TERM USE OF HIGH-RISK MEDICATION: ICD-10-CM

## 2019-08-29 DIAGNOSIS — L70.0 ACNE VULGARIS: ICD-10-CM

## 2019-08-29 DIAGNOSIS — L40.0 PSORIASIS VULGARIS: Primary | ICD-10-CM

## 2019-08-29 DIAGNOSIS — L01.00 IMPETIGO: ICD-10-CM

## 2019-08-29 DIAGNOSIS — L02.92 FURUNCLE: ICD-10-CM

## 2019-08-29 DIAGNOSIS — L82.1 SK (SEBORRHEIC KERATOSIS): ICD-10-CM

## 2019-08-29 DIAGNOSIS — L40.9 PSORIASIS: ICD-10-CM

## 2019-08-29 DIAGNOSIS — L40.0 PSORIASIS VULGARIS: ICD-10-CM

## 2019-08-29 DIAGNOSIS — D18.00 ANGIOMA: ICD-10-CM

## 2019-08-29 PROCEDURE — 88305 TISSUE EXAM BY PATHOLOGIST: CPT | Performed by: PATHOLOGY

## 2019-08-29 PROCEDURE — 99999 PR PBB SHADOW E&M-EST. PATIENT-LVL III: ICD-10-PCS | Mod: PBBFAC,,, | Performed by: DERMATOLOGY

## 2019-08-29 PROCEDURE — 11102 TANGNTL BX SKIN SINGLE LES: CPT | Mod: S$GLB,,, | Performed by: DERMATOLOGY

## 2019-08-29 PROCEDURE — 99999 PR PBB SHADOW E&M-EST. PATIENT-LVL III: CPT | Mod: PBBFAC,,, | Performed by: DERMATOLOGY

## 2019-08-29 PROCEDURE — 88305 TISSUE SPECIMEN TO PATHOLOGY, DERMATOLOGY: ICD-10-PCS | Mod: 26,,, | Performed by: PATHOLOGY

## 2019-08-29 PROCEDURE — 99214 OFFICE O/P EST MOD 30 MIN: CPT | Mod: 25,S$GLB,, | Performed by: DERMATOLOGY

## 2019-08-29 PROCEDURE — 86480 TB TEST CELL IMMUN MEASURE: CPT

## 2019-08-29 PROCEDURE — 11102 PR TANGENTIAL BIOPSY, SKIN, SINGLE LESION: ICD-10-PCS | Mod: S$GLB,,, | Performed by: DERMATOLOGY

## 2019-08-29 PROCEDURE — 36415 COLL VENOUS BLD VENIPUNCTURE: CPT | Mod: PO

## 2019-08-29 PROCEDURE — 88305 TISSUE EXAM BY PATHOLOGIST: CPT | Mod: 26,,, | Performed by: PATHOLOGY

## 2019-08-29 PROCEDURE — 99214 PR OFFICE/OUTPT VISIT, EST, LEVL IV, 30-39 MIN: ICD-10-PCS | Mod: 25,S$GLB,, | Performed by: DERMATOLOGY

## 2019-08-29 RX ORDER — CLINDAMYCIN PHOSPHATE 11.9 MG/ML
SOLUTION TOPICAL
Qty: 60 ML | Refills: 3 | Status: SHIPPED | OUTPATIENT
Start: 2019-08-29 | End: 2021-02-08 | Stop reason: SDUPTHER

## 2019-08-29 RX ORDER — MUPIROCIN 20 MG/G
OINTMENT TOPICAL
Qty: 22 G | Refills: 0 | Status: SHIPPED | OUTPATIENT
Start: 2019-08-29 | End: 2019-09-28 | Stop reason: SDUPTHER

## 2019-08-29 NOTE — PROGRESS NOTES
"  Subjective:       Patient ID:  Carleen Kincaid is a 58 y.o. female who presents for   Chief Complaint   Patient presents with    Skin Check    Psoriasis     Patient is here today for a "mole" check.   Pt has a history of  moderate sun exposure in the past.   Pt recalls several blistering sunburns in the past- yes, 5yrs ago  Pt has history of tanning bed use- yes, 4yrs ago  Pt has  had moles removed in the past- no  Pt has history of melanoma in first degree relatives-  Three brothers    Pt also here today for psoriasis f/u, pt states improving, tx Humira  Did not pursue getting her hep B vaccines.   C/o lesion on upper forehead. Present for years. Irritated and scaly.was tx with cryo and still present  C/o acne on her face. Uses mupirocen intermittently. Comes and goes.   Pt with strong fam hx ofmelanoma and colon ca.       Psoriasis         Review of Systems   Constitutional: Positive for fatigue and malaise. Negative for fever and chills.   Eyes: Negative for visual change.   Respiratory: Positive for shortness of breath. Negative for cough.         Decreased exercise tolerance   Musculoskeletal: Negative for joint swelling and arthralgias.   Skin: Positive for dry skin, daily sunscreen use and activity-related sunscreen use. Negative for recent sunburn and abscesses.   Neurological: Negative for focal weakness, seizures and numbness.   Hematologic/Lymphatic: Does not bruise/bleed easily.        Objective:    Physical Exam   Constitutional: She appears well-developed and well-nourished. No distress.   Neurological: She is alert and oriented to person, place, and time. She is not disoriented.   Psychiatric: She has a normal mood and affect.   Skin:   Areas Examined (abnormalities noted in diagram):   Scalp / Hair Palpated and Inspected  Head / Face Inspection Performed  Neck Inspection Performed  Chest / Axilla Inspection Performed  Abdomen Inspection Performed  Genitals / Buttocks / Groin Inspection " Performed  Back Inspection Performed  RUE Inspected  LUE Inspection Performed  RLE Inspected  LLE Inspection Performed  Nails and Digits Inspection Performed                                       Diagram Legend     Erythematous scaling macule/papule c/w actinic keratosis       Vascular papule c/w angioma      Pigmented verrucoid papule/plaque c/w seborrheic keratosis      Yellow umbilicated papule c/w sebaceous hyperplasia      Irregularly shaped tan macule c/w lentigo     1-2 mm smooth white papules consistent with Milia      Movable subcutaneous cyst with punctum c/w epidermal inclusion cyst      Subcutaneous movable cyst c/w pilar cyst      Firm pink to brown papule c/w dermatofibroma      Pedunculated fleshy papule(s) c/w skin tag(s)      Evenly pigmented macule c/w junctional nevus     Mildly variegated pigmented, slightly irregular-bordered macule c/w mildly atypical nevus      Flesh colored to evenly pigmented papule c/w intradermal nevus       Pink pearly papule/plaque c/w basal cell carcinoma      Erythematous hyperkeratotic cursted plaque c/w SCC      Surgical scar with no sign of skin cancer recurrence      Open and closed comedones      Inflammatory papules and pustules      Verrucoid papule consistent consistent with wart     Erythematous eczematous patches and plaques     Dystrophic onycholytic nail with subungual debris c/w onychomycosis     Umbilicated papule    Erythematous-base heme-crusted tan verrucoid plaque consistent with inflamed seborrheic keratosis     Erythematous Silvery Scaling Plaque c/w Psoriasis     See annotation          Assessment / Plan:      Pathology Orders:     Normal Orders This Visit    Tissue Specimen To Pathology, Dermatology     Questions:    Directional Terms:  Other(comment)    Clinical Information:  r/o isk v other    Specific Site:  left upper forehead        Psoriasis vulgaris  Long-term use of high-risk medication  -     Quantiferon Gold TB; Future; Expected date:  08/29/2019  Pt can continue Humira 40 mg sunb q qow. Tolerating well   Pt is not UTD on age appropriate CA screenings and she is encouraged to follow up with PCP for this   She is having a cardiology w/u for her decreased exercise tolerance  Labs recent as above ok for continuation of humira  Avoid live vaccines  Pt to pursue Hep B vaccination with ID    Neoplasm of uncertain behavior of skin  Shave biopsy procedure note:    Shave biopsy performed after verbal consent including risk of infection, scar, recurrence, need for additional treatment of site. Area prepped with alcohol, anesthetized with approximately 1.0cc of 1% lidocaine with epinephrine. Lesional tissue shaved with razor blade. Hemostasis achieved with application of aluminum chloride followed by hyfrecation. No complications. Dressing applied. Wound care explained.      -     Tissue Specimen To Pathology, Dermatology    Lentigo  This is a benign hyperpigmented sun induced lesion. Daily sun protection will reduce the number of new lesions. Treatment of these benign lesions are considered cosmetic.  The nature of sun-induced photo-aging and skin cancers is discussed.  Sun avoidance, protective clothing, and the use of 30-SPF sunscreens is advised. Observe closely for skin damage/changes, and call if such occurs.    Angioma  .These are benign vascular lesions that are inherited.  Treatment is not necessary.    SK (seborrheic keratosis)  These are benign inherited growths without a malignant potential. Reassurance given to patient. No treatment is necessary.     Acne vulgaris  -     clindamycin (CLEOCIN T) 1 % external solution; AAA bid  Dispense: 60 mL; Refill: 3    Impetigo/Furuncle  -     mupirocin (BACTROBAN) 2 % ointment; aaa bid prn  Dispense: 22 g; Refill: 0    Family History of Melanoma and long term use Humira  Total body skin examination performed today including at least 12 points as noted in physical examination. No lesions suspicious for  malignancy noted.             Follow up for prn bx report. and 6mo

## 2019-08-30 LAB
M TB IFN-G CD4+ BCKGRND COR BLD-ACNC: -0.04 IU/ML
MITOGEN IGNF BCKGRD COR BLD-ACNC: >10 IU/ML
MITOGEN IGNF BCKGRD COR BLD-ACNC: NEGATIVE [IU]/ML
NIL: 0.09 IU/ML
TB2 - NIL: -0.04 IU/ML

## 2019-09-05 ENCOUNTER — HOSPITAL ENCOUNTER (OUTPATIENT)
Dept: RADIOLOGY | Facility: HOSPITAL | Age: 59
Discharge: HOME | End: 2019-09-05
Attending: ORTHOPAEDIC SURGERY
Payer: COMMERCIAL

## 2019-09-05 DIAGNOSIS — M25.561 PAIN IN BOTH KNEES, UNSPECIFIED CHRONICITY: ICD-10-CM

## 2019-09-05 DIAGNOSIS — M25.562 PAIN IN BOTH KNEES, UNSPECIFIED CHRONICITY: ICD-10-CM

## 2019-09-05 DIAGNOSIS — M54.40 ACUTE RIGHT-SIDED LOW BACK PAIN WITH SCIATICA, SCIATICA LATERALITY UNSPECIFIED: ICD-10-CM

## 2019-09-05 DIAGNOSIS — Z98.1 S/P LUMBAR SPINAL FUSION: ICD-10-CM

## 2019-09-05 PROCEDURE — 72131 CT LUMBAR SPINE W/O DYE: CPT | Mod: 26,,, | Performed by: RADIOLOGY

## 2019-09-05 PROCEDURE — 72131 CT LUMBAR SPINE WITHOUT CONTRAST: ICD-10-PCS | Mod: 26,,, | Performed by: RADIOLOGY

## 2019-09-05 PROCEDURE — 72131 CT LUMBAR SPINE W/O DYE: CPT | Mod: TC

## 2019-09-10 ENCOUNTER — TELEPHONE (OUTPATIENT)
Dept: SPORTS MEDICINE | Facility: CLINIC | Age: 59
End: 2019-09-10

## 2019-09-10 ENCOUNTER — OFFICE VISIT (OUTPATIENT)
Dept: SPORTS MEDICINE | Facility: CLINIC | Age: 59
End: 2019-09-10
Payer: COMMERCIAL

## 2019-09-10 VITALS
DIASTOLIC BLOOD PRESSURE: 81 MMHG | HEART RATE: 96 BPM | SYSTOLIC BLOOD PRESSURE: 134 MMHG | BODY MASS INDEX: 33.13 KG/M2 | WEIGHT: 180 LBS | HEIGHT: 62 IN

## 2019-09-10 DIAGNOSIS — Z96.653 HISTORY OF TOTAL BILATERAL KNEE REPLACEMENT: ICD-10-CM

## 2019-09-10 DIAGNOSIS — Z98.1 S/P LUMBAR SPINAL FUSION: ICD-10-CM

## 2019-09-10 DIAGNOSIS — M54.40 ACUTE RIGHT-SIDED LOW BACK PAIN WITH SCIATICA, SCIATICA LATERALITY UNSPECIFIED: Primary | ICD-10-CM

## 2019-09-10 DIAGNOSIS — M25.561 PAIN IN BOTH KNEES, UNSPECIFIED CHRONICITY: ICD-10-CM

## 2019-09-10 DIAGNOSIS — M25.562 PAIN IN BOTH KNEES, UNSPECIFIED CHRONICITY: ICD-10-CM

## 2019-09-10 PROCEDURE — 99999 PR PBB SHADOW E&M-EST. PATIENT-LVL III: CPT | Mod: PBBFAC,,, | Performed by: PHYSICIAN ASSISTANT

## 2019-09-10 PROCEDURE — 99999 PR PBB SHADOW E&M-EST. PATIENT-LVL III: ICD-10-PCS | Mod: PBBFAC,,, | Performed by: PHYSICIAN ASSISTANT

## 2019-09-10 PROCEDURE — 99213 OFFICE O/P EST LOW 20 MIN: CPT | Mod: S$GLB,,, | Performed by: PHYSICIAN ASSISTANT

## 2019-09-10 PROCEDURE — 3008F PR BODY MASS INDEX (BMI) DOCUMENTED: ICD-10-PCS | Mod: CPTII,S$GLB,, | Performed by: PHYSICIAN ASSISTANT

## 2019-09-10 PROCEDURE — 99213 PR OFFICE/OUTPT VISIT, EST, LEVL III, 20-29 MIN: ICD-10-PCS | Mod: S$GLB,,, | Performed by: PHYSICIAN ASSISTANT

## 2019-09-10 PROCEDURE — 3008F BODY MASS INDEX DOCD: CPT | Mod: CPTII,S$GLB,, | Performed by: PHYSICIAN ASSISTANT

## 2019-09-10 NOTE — PROGRESS NOTES
Subjective:          Chief Complaint: Carleen Kincaid is a 58 y.o. female who had concerns including Back Pain.    Interval Hx: Pt presents for CT results. She reports continued pain in lower back.    Previous HPI: Patient is here for a follow up for her right knee. She is doing well. She complete chair exercises and rides her bike regularly. She is currently taking diclofenac. She does have concerns about her lower back pain, lumbar spine pain and glute/hip pain. The pain began 4-5 weeks ago. She under went a lumbar spine fusion 4 years ago in [place], [place]. The fusion was done at         DATE OF PROCEDURE:  12/12/2017     ATTENDING SURGEON:  Sri Roberts M.D.     CO-SURGEON:  Santiago Qiu M.D.     FIRST ASSISTANT:  SMA Sreekanth-assistant.     SECOND ASSISTANT:  Jossue Irene M.D.     PREOPERATIVE DIAGNOSIS:  Failed right total knee replacement.     POSTOPERATIVE DIAGNOSIS:  Failed right total knee replacement.     PROCEDURES PERFORMED:  Right complex revision total knee replacement.      Pain   Associated symptoms include joint swelling. Pertinent negatives include no chest pain, fever, numbness or rash.   Back Pain   Pertinent negatives include no chest pain, fever, numbness or paresthesias.       Review of Systems   Constitution: Negative for fever and night sweats.   HENT: Negative for hearing loss.    Eyes: Negative for blurred vision and visual disturbance.   Cardiovascular: Negative for chest pain and leg swelling.   Respiratory: Negative for shortness of breath.    Endocrine: Negative for polyuria.   Hematologic/Lymphatic: Negative for bleeding problem.   Skin: Negative for rash.   Musculoskeletal: Positive for back pain, joint pain and joint swelling. Negative for muscle cramps and muscle weakness.   Gastrointestinal: Negative for melena.   Genitourinary: Negative for hematuria.   Neurological: Negative for loss of balance, numbness and paresthesias.   Psychiatric/Behavioral: Negative for  altered mental status.       Pain Related Questions  Over the past 3 days, what was your average pain during activity? (I.e. running, jogging, walking, climbing stairs, getting dressed, ect.): 3  Over the past 3 days, what was your highest pain level?: 3  Over the past 3 days, what was your lowest pain level? : 1    Other  Was the patient's HEIGHT measured or patient reported?: Patient Reported  Was the patient's WEIGHT measured or patient reported?: Measured      Objective:        General: Carleen is well-developed, well-nourished, appears stated age, in no acute distress, alert and oriented to time, place and person.     General    Vitals reviewed.  Constitutional: She is oriented to person, place, and time. She appears well-developed and well-nourished. No distress.   HENT:   Mouth/Throat: No oropharyngeal exudate.   Eyes: Right eye exhibits no discharge. Left eye exhibits no discharge.   Neck: Normal range of motion.   Cardiovascular: Normal rate.    Pulmonary/Chest: Effort normal and breath sounds normal. No respiratory distress.   Neurological: She is alert and oriented to person, place, and time. She has normal reflexes. No cranial nerve deficit. Coordination abnormal.   Psychiatric: She has a normal mood and affect. Her behavior is normal. Judgment and thought content normal.     General Musculoskeletal Exam   Gait: normal   Pelvic Obliquity: none      Right Knee Exam     Inspection   Alignment:  normal  Erythema: absent  Scars: present  Swelling: absent  Effusion: absent  Deformity: absent  Bruising: absent    Tenderness   The patient is experiencing no tenderness.     Range of Motion   Extension: 0   Right knee flexion: 125.     Tests   Meniscus   Sonal:  Medial - negative Lateral - negative  Ligament Examination Lachman: normal (-1 to 2mm) PCL-Posterior Drawer: normal (0 to 2mm)     MCL - Valgus: normal (0 to 2mm)  LCL - Varus: normalPivot Shift: normal (Equal)Reverse Pivot Shift: normal (Equal)Dial  Test at 30 degrees: normal (< 5 degrees)Dial Test at 90 degrees: normal (< 5 degrees)  Posterior Sag Test: negative  Posterolateral Corner: unstable (>15 degrees difference)  Patella   Patellar apprehension: negative  Passive Patellar Tilt: neutral  Patellar Tracking: normal  Patellar Glide (quadrants): Lateral - 1   Medial - 2  Q-Angle at 90 degrees: normal  Patellar Grind: negative  J-Sign: none    Other   Meniscal Cyst: absent  Popliteal (Baker's) Cyst: absent  Sensation: normal    Left Knee Exam     Inspection   Alignment:  normal  Erythema: absent  Scars: present  Swelling: absent  Effusion: absent  Deformity: absent  Bruising: absent    Tenderness   The patient is experiencing no tenderness.     Range of Motion   Extension: 0   Flexion: 130     Tests   Meniscus   Sonal:  Medial - negative Lateral - negative  Stability Lachman: normal (-1 to 2mm) PCL-Posterior Drawer: normal (0 to 2mm)  MCL - Valgus: normal (0 to 2mm)  LCL - Varus: normal (0 to 2mm)Pivot Shift: normal (Equal)Reverse Pivot Shift: normal (Equal)Dial Test at 30 degrees: normal (< 5 degrees)Dial Test at 90 degrees: normal (< 5 degrees)  Posterior Sag Test: negative  Posterolateral Corner: unstable (>15 degrees difference)  Patella   Patellar apprehension: negative  Passive Patellar Tilt: neutral  Patellar Tracking: normal  Patellar Glide (Quadrants): Lateral - 1 Medial - 2  Q-Angle at 90 degrees: normal  Patellar Grind: negative  J-Sign: J sign absent    Other   Meniscal Cyst: absent  Popliteal (Baker's) Cyst: absent  Sensation: normal    Right Hip Exam     Inspection   Scars: absent  Swelling: absent  Bruising: absent  No deformity of hip.  Quadriceps Atrophy:  Negative  Erythema: absent    Range of Motion   Abduction:  40 normal   Adduction:  20 normal   Extension:  0 normal   Flexion:  120 normal   External rotation:  60 normal   Internal rotation:  15 normal     Tests   Pain w/ forced internal rotation (AIDEN): absent  Pain w/ forced  external rotation (FADIR): absent  Julio Cesar: negative  Trendelenburg Test: negative  Circumduction test: negative  Stinchfield test: negative  Log Roll: negative  Snapping Hip (internal): negative  Step-down test: negative  Resisted sit-up pain: negative  Unresisted sit-up pain: negative  Resisted sit-up pain with adductor contraction pain: negative    Other   Sensation: normal  Left Hip Exam     Inspection   Scars: absent  Swelling: absent  No deformity of hip.  Quadriceps Atrophy:  negative  Erythema: absent  Bruising: absent    Range of Motion   Abduction:  40 normal   Adduction:  20 normal   Extension:  0 normal   Flexion:  120 normal   External rotation:  60 normal   Internal rotation: 15 normal     Tests   Pain w/ forced internal rotation (AIDEN): absent  Pain w/ forced external rotation (FADIR): absent  Julio Cesar: negative  Trendelenburg Test: negative  Circumduction test: negative  Stinchfield test: negative  Log Roll: negative  Snapping Hip (internal): negative  Step-down test: negative  Resisted sit-up pain: negative  Resisted sit-up pain with adductor contraction pain: negative  Unresisted sit-up pain: negative    Other   Sensation: normal      Back (L-Spine & T-Spine) / Neck (C-Spine) Exam   Back exam is normal.    Back (L-Spine & T-Spine) Tests   Right Side Tests  Straight leg raise: + at 70 deg               Muscle Strength   Right Lower Extremity   Hip Abduction: 5/5   Hip Adduction: 5/5   Hip Flexion: 5/5   Quadriceps:  5/5   Hamstrin/5   Left Lower Extremity   Hip Abduction: 5/5   Hip Adduction: 5/5   Hip Flexion: 5/5   Quadriceps:  5/5   Hamstrin/5     Reflexes     Left Side  Quadriceps:  2+  Achilles:  2+    Right Side   Quadriceps:  2+  Achilles:  2+    Vascular Exam     Right Pulses  Dorsalis Pedis:      2+  Posterior Tibial:      2+        Left Pulses  Dorsalis Pedis:      2+  Posterior Tibial:      2+        Capillary Refill  Right Hand: normal capillary refill  Left Hand: normal capillary  refill    Edema  Right Upper Leg: absent  Left Upper Leg: absent    RADIOGRAPHS TODAY  Right: There is a TKA in place alignment no complication.    Left: There is a TKA in place good alignment no complication.    CT LUMBAR SPINE WITHOUT CONTRAST    CLINICAL HISTORY:  Follow up lumbar spine fusion;Arthrodesis status    TECHNIQUE:  Low dose axial images, sagittal and coronal reformations were performed though the lumbar spine.  Contrast was not administered.    COMPARISON:  Lumbar radiograph 08/28/2019    FINDINGS:  Postsurgical changes of lumbar disc fusion and fixation rods in L4 and L5.  Severe degenerative changes of the L3-L4 level with anterolisthesis of L4 with respect to L3 and L5.    The left L5 screw is positioned in the superolateral aspect of the vertebral body.  No fracture or loosening of the screw.    Mild DJD to the remainder of the lumbar spine. There is no evidence of fracture or osseous lytic or blastic process.  The intervertebral disk spaces appear well maintained.  Focal degenerative changes are described below.    T12-L1: There is no posterior disc bulge.  There is no neural foraminal narrowing. There is no central canal narrowing    L1-L2: There is no posterior disc bulge.  There is no neural foraminal narrowing. There is no central canal narrowing    L2-L3: There is no posterior disc bulge.  There is no neural foraminal narrowing. There is no central canal narrowing    L3-L4: Mild posterior disc bulge.  Moderate right neural foraminal narrowing. There is no central canal narrowing    L4-L5: There is extensive soft tissue/beam hardening artifact at this level, making it difficult to evaluate.  Moderate neural foraminal narrowing bilaterally, left greater than right.  There is significant soft tissue likely causing mild central canal narrowing.  This may be postop scarring.    L5-S1: Mild posterior disc bulge.  There is no neural foraminal narrowing. There is no central canal narrowing    Two  subcentimeter nonobstructing calculi seen in the right renal collecting system.  One calculus is partially visualized in the left renal collecting system.      Impression       Postsurgical changes of lumbar disc fusion and fixation rods at L4 and L5.  Severe degenerative changes of the L3-L4 level with anterolisthesis of L4 with respect to L3 and L5.    The left L5 screw is positioned in the most superolateral aspect of the vertebral body. No fracture or loosening of the screw seen.    Extensive soft tissue/beam hardening artifact at the L4-L5 level, making it difficult to evaluate.  There is moderate neural foraminal narrowing bilaterally at L4-L5, left greater than right.  Significant soft tissue about the L4-5 level may be creating some canal narrowing.  This may be postoperative scarring.    Moderate right neural foraminal narrowing at L3-L4.    Incidental findings of renal calculi bilaterally as above           Assessment:       Encounter Diagnoses   Name Primary?    Acute right-sided low back pain with sciatica, sciatica laterality unspecified Yes    Pain in both knees, unspecified chronicity     S/P lumbar spinal fusion     History of total bilateral knee replacement           Plan:       1. IKDC, SF-12 and KOOS was filled out today in clinic.     RTC in 1 years with Sri Roberts or Mid-level provider Patient will fill out IKDC, SF-12 and KOOS and bilateral knee series on return.    2. Continue HEP and activities as tolerated    3. Refill of Diclofenac provided today    4. Referral to Dr. Bartlett's Team for CT Results Review    5. CT Scan results reviewed today.      All of the patient's questions were answered and the patient will contact us if they have any questions or concerns in the interim.                  [facility] patient questionnaires have been collected today.

## 2019-09-21 DIAGNOSIS — Z79.620 ADALIMUMAB (HUMIRA) LONG-TERM USE: ICD-10-CM

## 2019-09-21 DIAGNOSIS — L40.9 PSORIASIS: ICD-10-CM

## 2019-09-25 RX ORDER — ADALIMUMAB 40MG/0.8ML
KIT SUBCUTANEOUS
Qty: 2 PEN | Refills: 6 | Status: SHIPPED | OUTPATIENT
Start: 2019-09-25 | End: 2020-08-27

## 2019-09-28 DIAGNOSIS — L02.92 FURUNCLE: ICD-10-CM

## 2019-09-30 RX ORDER — DICLOFENAC SODIUM 75 MG/1
TABLET, DELAYED RELEASE ORAL
Qty: 60 TABLET | Refills: 0 | Status: SHIPPED | OUTPATIENT
Start: 2019-09-30 | End: 2019-12-23

## 2019-09-30 RX ORDER — MUPIROCIN 20 MG/G
OINTMENT TOPICAL
Qty: 22 G | Refills: 0 | Status: SHIPPED | OUTPATIENT
Start: 2019-09-30 | End: 2020-03-16

## 2019-10-16 ENCOUNTER — PATIENT MESSAGE (OUTPATIENT)
Dept: DERMATOLOGY | Facility: CLINIC | Age: 59
End: 2019-10-16

## 2019-12-23 RX ORDER — DICLOFENAC SODIUM 75 MG/1
TABLET, DELAYED RELEASE ORAL
Qty: 60 TABLET | Refills: 0 | Status: SHIPPED | OUTPATIENT
Start: 2019-12-23 | End: 2020-03-15 | Stop reason: SDUPTHER

## 2020-03-09 ENCOUNTER — PATIENT MESSAGE (OUTPATIENT)
Dept: SPORTS MEDICINE | Facility: CLINIC | Age: 60
End: 2020-03-09

## 2020-03-15 DIAGNOSIS — L02.92 FURUNCLE: ICD-10-CM

## 2020-03-15 RX ORDER — DICLOFENAC SODIUM 75 MG/1
TABLET, DELAYED RELEASE ORAL
Qty: 60 TABLET | Refills: 0 | Status: SHIPPED | OUTPATIENT
Start: 2020-03-15 | End: 2020-05-14

## 2020-03-16 RX ORDER — MUPIROCIN 20 MG/G
OINTMENT TOPICAL
Qty: 22 G | Refills: 0 | Status: SHIPPED | OUTPATIENT
Start: 2020-03-16 | End: 2020-08-24

## 2020-04-23 ENCOUNTER — PATIENT MESSAGE (OUTPATIENT)
Dept: ORTHOPEDICS | Facility: CLINIC | Age: 60
End: 2020-04-23

## 2020-05-14 RX ORDER — DICLOFENAC SODIUM 75 MG/1
TABLET, DELAYED RELEASE ORAL
Qty: 60 TABLET | Refills: 0 | Status: SHIPPED | OUTPATIENT
Start: 2020-05-14 | End: 2020-06-24

## 2020-08-27 DIAGNOSIS — Z79.620 ADALIMUMAB (HUMIRA) LONG-TERM USE: ICD-10-CM

## 2020-08-27 DIAGNOSIS — L40.9 PSORIASIS: ICD-10-CM

## 2020-08-27 RX ORDER — ADALIMUMAB 40MG/0.8ML
KIT SUBCUTANEOUS
Qty: 2 PEN | Refills: 0 | Status: SHIPPED | OUTPATIENT
Start: 2020-08-27 | End: 2020-10-14

## 2020-10-14 DIAGNOSIS — Z79.620 ADALIMUMAB (HUMIRA) LONG-TERM USE: ICD-10-CM

## 2020-10-14 DIAGNOSIS — L40.9 PSORIASIS: ICD-10-CM

## 2020-10-14 RX ORDER — ADALIMUMAB 40MG/0.8ML
KIT SUBCUTANEOUS
Qty: 2 PEN | Refills: 0 | Status: SHIPPED | OUTPATIENT
Start: 2020-10-14 | End: 2021-02-08 | Stop reason: SDUPTHER

## 2021-01-13 ENCOUNTER — PATIENT MESSAGE (OUTPATIENT)
Dept: DERMATOLOGY | Facility: CLINIC | Age: 61
End: 2021-01-13

## 2021-01-20 ENCOUNTER — PATIENT MESSAGE (OUTPATIENT)
Dept: DERMATOLOGY | Facility: CLINIC | Age: 61
End: 2021-01-20

## 2021-02-08 ENCOUNTER — OFFICE VISIT (OUTPATIENT)
Dept: DERMATOLOGY | Facility: CLINIC | Age: 61
End: 2021-02-08
Payer: COMMERCIAL

## 2021-02-08 ENCOUNTER — LAB VISIT (OUTPATIENT)
Dept: LAB | Facility: HOSPITAL | Age: 61
End: 2021-02-08
Attending: DERMATOLOGY
Payer: COMMERCIAL

## 2021-02-08 DIAGNOSIS — L70.0 ACNE VULGARIS: ICD-10-CM

## 2021-02-08 DIAGNOSIS — Z79.60 LONG-TERM USE OF IMMUNOSUPPRESSANT MEDICATION: ICD-10-CM

## 2021-02-08 DIAGNOSIS — L40.9 PSORIASIS: ICD-10-CM

## 2021-02-08 DIAGNOSIS — L40.0 PSORIASIS VULGARIS: Primary | ICD-10-CM

## 2021-02-08 DIAGNOSIS — M19.90 ARTHRITIS: ICD-10-CM

## 2021-02-08 DIAGNOSIS — Z79.620 ADALIMUMAB (HUMIRA) LONG-TERM USE: ICD-10-CM

## 2021-02-08 DIAGNOSIS — L40.0 PSORIASIS VULGARIS: ICD-10-CM

## 2021-02-08 LAB
ALBUMIN SERPL BCP-MCNC: 4.3 G/DL (ref 3.5–5.2)
ALP SERPL-CCNC: 135 U/L (ref 55–135)
ALT SERPL W/O P-5'-P-CCNC: 28 U/L (ref 10–44)
ANION GAP SERPL CALC-SCNC: 10 MMOL/L (ref 8–16)
AST SERPL-CCNC: 25 U/L (ref 10–40)
BASOPHILS # BLD AUTO: 0.03 K/UL (ref 0–0.2)
BASOPHILS NFR BLD: 0.5 % (ref 0–1.9)
BILIRUB SERPL-MCNC: 0.5 MG/DL (ref 0.1–1)
BUN SERPL-MCNC: 15 MG/DL (ref 8–23)
CALCIUM SERPL-MCNC: 9.7 MG/DL (ref 8.7–10.5)
CHLORIDE SERPL-SCNC: 100 MMOL/L (ref 95–110)
CO2 SERPL-SCNC: 28 MMOL/L (ref 23–29)
CREAT SERPL-MCNC: 0.8 MG/DL (ref 0.5–1.4)
DIFFERENTIAL METHOD: ABNORMAL
EOSINOPHIL # BLD AUTO: 0.1 K/UL (ref 0–0.5)
EOSINOPHIL NFR BLD: 1.4 % (ref 0–8)
ERYTHROCYTE [DISTWIDTH] IN BLOOD BY AUTOMATED COUNT: 14.3 % (ref 11.5–14.5)
EST. GFR  (AFRICAN AMERICAN): >60 ML/MIN/1.73 M^2
EST. GFR  (NON AFRICAN AMERICAN): >60 ML/MIN/1.73 M^2
GLUCOSE SERPL-MCNC: 88 MG/DL (ref 70–110)
HCT VFR BLD AUTO: 43.6 % (ref 37–48.5)
HGB BLD-MCNC: 13.6 G/DL (ref 12–16)
IMM GRANULOCYTES # BLD AUTO: 0.02 K/UL (ref 0–0.04)
IMM GRANULOCYTES NFR BLD AUTO: 0.3 % (ref 0–0.5)
LYMPHOCYTES # BLD AUTO: 2.2 K/UL (ref 1–4.8)
LYMPHOCYTES NFR BLD: 37.7 % (ref 18–48)
MCH RBC QN AUTO: 28.1 PG (ref 27–31)
MCHC RBC AUTO-ENTMCNC: 31.2 G/DL (ref 32–36)
MCV RBC AUTO: 90 FL (ref 82–98)
MONOCYTES # BLD AUTO: 0.5 K/UL (ref 0.3–1)
MONOCYTES NFR BLD: 8.9 % (ref 4–15)
NEUTROPHILS # BLD AUTO: 2.9 K/UL (ref 1.8–7.7)
NEUTROPHILS NFR BLD: 51.2 % (ref 38–73)
NRBC BLD-RTO: 0 /100 WBC
PLATELET # BLD AUTO: 244 K/UL (ref 150–350)
PMV BLD AUTO: 11.1 FL (ref 9.2–12.9)
POTASSIUM SERPL-SCNC: 3.7 MMOL/L (ref 3.5–5.1)
PROT SERPL-MCNC: 7.4 G/DL (ref 6–8.4)
RBC # BLD AUTO: 4.84 M/UL (ref 4–5.4)
SODIUM SERPL-SCNC: 138 MMOL/L (ref 136–145)
WBC # BLD AUTO: 5.73 K/UL (ref 3.9–12.7)

## 2021-02-08 PROCEDURE — 1126F PR PAIN SEVERITY QUANTIFIED, NO PAIN PRESENT: ICD-10-PCS | Mod: S$GLB,,, | Performed by: DERMATOLOGY

## 2021-02-08 PROCEDURE — 11900 PR INJECTION INTO SKIN LESIONS, UP TO 7: ICD-10-PCS | Mod: S$GLB,,, | Performed by: DERMATOLOGY

## 2021-02-08 PROCEDURE — 11900 INJECT SKIN LESIONS </W 7: CPT | Mod: S$GLB,,, | Performed by: DERMATOLOGY

## 2021-02-08 PROCEDURE — 36415 COLL VENOUS BLD VENIPUNCTURE: CPT | Mod: PO

## 2021-02-08 PROCEDURE — 80053 COMPREHEN METABOLIC PANEL: CPT

## 2021-02-08 PROCEDURE — 86480 TB TEST CELL IMMUN MEASURE: CPT

## 2021-02-08 PROCEDURE — 85025 COMPLETE CBC W/AUTO DIFF WBC: CPT

## 2021-02-08 PROCEDURE — 99214 PR OFFICE/OUTPT VISIT, EST, LEVL IV, 30-39 MIN: ICD-10-PCS | Mod: 25,S$GLB,, | Performed by: DERMATOLOGY

## 2021-02-08 PROCEDURE — 1126F AMNT PAIN NOTED NONE PRSNT: CPT | Mod: S$GLB,,, | Performed by: DERMATOLOGY

## 2021-02-08 PROCEDURE — 99214 OFFICE O/P EST MOD 30 MIN: CPT | Mod: 25,S$GLB,, | Performed by: DERMATOLOGY

## 2021-02-08 PROCEDURE — 99999 PR PBB SHADOW E&M-EST. PATIENT-LVL III: ICD-10-PCS | Mod: PBBFAC,,, | Performed by: DERMATOLOGY

## 2021-02-08 PROCEDURE — 99999 PR PBB SHADOW E&M-EST. PATIENT-LVL III: CPT | Mod: PBBFAC,,, | Performed by: DERMATOLOGY

## 2021-02-08 RX ORDER — CLINDAMYCIN PHOSPHATE 11.9 MG/ML
SOLUTION TOPICAL
Qty: 60 ML | Refills: 3 | Status: SHIPPED | OUTPATIENT
Start: 2021-02-08 | End: 2022-04-05

## 2021-02-08 RX ORDER — ADALIMUMAB 40MG/0.8ML
KIT SUBCUTANEOUS
Qty: 2 PEN | Refills: 6 | Status: SHIPPED | OUTPATIENT
Start: 2021-02-08 | End: 2022-04-05

## 2021-02-10 LAB
GAMMA INTERFERON BACKGROUND BLD IA-ACNC: 0.07 IU/ML
M TB IFN-G CD4+ BCKGRND COR BLD-ACNC: 0 IU/ML
MITOGEN IGNF BCKGRD COR BLD-ACNC: 8.99 IU/ML
TB GOLD PLUS: NEGATIVE
TB2 - NIL: 0 IU/ML

## 2021-05-12 ENCOUNTER — PATIENT MESSAGE (OUTPATIENT)
Dept: RESEARCH | Facility: HOSPITAL | Age: 61
End: 2021-05-12

## 2021-07-22 ENCOUNTER — TELEPHONE (OUTPATIENT)
Dept: DERMATOLOGY | Facility: CLINIC | Age: 61
End: 2021-07-22

## 2021-11-19 NOTE — PROGRESS NOTES
Subjective:          Chief Complaint: Carleen Kincaid is a 57 y.o. female who had concerns including Pain of the Right Knee.    Patient is here for a follow up for her right knee. She is doing well. Is attending PT at a hospital in [place].         DATE OF PROCEDURE:  12/12/2017     ATTENDING SURGEON:  Sri Roberts M.D.     CO-SURGEON:  Santiago Qiu M.D.     FIRST ASSISTANT:  SMA Sreekanth-assistant.     SECOND ASSISTANT:  Jossue Irene M.D.     PREOPERATIVE DIAGNOSIS:  Failed right total knee replacement.     POSTOPERATIVE DIAGNOSIS:  Failed right total knee replacement.     PROCEDURES PERFORMED:  Right complex revision total knee replacement.        Pain   Associated symptoms include joint swelling. Pertinent negatives include no chest pain, fever, numbness or rash.       Review of Systems   Constitution: Negative for fever and night sweats.   HENT: Negative for hearing loss.    Eyes: Negative for blurred vision and visual disturbance.   Cardiovascular: Negative for chest pain and leg swelling.   Respiratory: Negative for shortness of breath.    Endocrine: Negative for polyuria.   Hematologic/Lymphatic: Negative for bleeding problem.   Skin: Negative for rash.   Musculoskeletal: Positive for joint pain and joint swelling. Negative for back pain, muscle cramps and muscle weakness.   Gastrointestinal: Negative for melena.   Genitourinary: Negative for hematuria.   Neurological: Negative for loss of balance, numbness and paresthesias.   Psychiatric/Behavioral: Negative for altered mental status.                   Objective:        General: Carleen is well-developed, well-nourished, appears stated age, in no acute distress, alert and oriented to time, place and person.     General    Vitals reviewed.  Constitutional: She is oriented to person, place, and time. She appears well-developed and well-nourished. No distress.   HENT:   Mouth/Throat: No oropharyngeal exudate.   Eyes: Right eye exhibits no discharge.  Left eye exhibits no discharge.   Neck: Normal range of motion.   Pulmonary/Chest: Effort normal and breath sounds normal. No respiratory distress.   Neurological: She is alert and oriented to person, place, and time. She has normal reflexes. No cranial nerve deficit. Coordination abnormal.   Psychiatric: She has a normal mood and affect. Her behavior is normal. Judgment and thought content normal.     General Musculoskeletal Exam   Gait: normal       Right Knee Exam     Inspection   Erythema: absent  Scars: present  Swelling: absent  Effusion: effusion  Deformity: deformity  Bruising: absent    Tenderness   The patient is experiencing no tenderness.         Range of Motion   Extension: 0   Flexion: 130     Tests   Meniscus   Sonal:  Medial - negative Lateral - negative  Ligament Examination Lachman: normal (-1 to 2mm) PCL-Posterior Drawer: normal (0 to 2mm)     MCL - Valgus: normal (0 to 2mm)  LCL - Varus: normalPivot Shift: normal (Equal)Reverse Pivot Shift: normal (Equal)Dial Test at 30 degrees: normal (< 5 degrees)Dial Test at 90 degrees: normal (< 5 degrees)  Posterior Sag Test: negative  Posterolateral Corner: unstable (>15 degrees difference)  Patella   Patellar Apprehension: negative  Passive Patellar Tilt: neutral  Patellar Tracking: normal  Patellar Glide (quadrants): Lateral - 1   Medial - 2  Q-Angle at 90 degrees: normal  Patellar Grind: negative  J-Sign: none    Other   Meniscal Cyst: absent  Popliteal (Baker's) Cyst: absent  Sensation: normal    Left Knee Exam     Inspection   Erythema: absent  Scars: present  Swelling: absent  Effusion: absent  Deformity: deformity  Bruising: absent    Tenderness   The patient is experiencing no tenderness.         Range of Motion   Extension: 0   Flexion: 130     Tests   Meniscus   Sonal:  Medial - negative Lateral - negative  Stability Lachman: normal (-1 to 2mm) PCL-Posterior Drawer: normal (0 to 2mm)  MCL - Valgus: normal (0 to 2mm)  LCL - Varus: normal (0  to 2mm)Pivot Shift: normal (Equal)Reverse Pivot Shift: normal (Equal)Dial Test at 30 degrees: normal (< 5 degrees)Dial Test at 90 degrees: normal (< 5 degrees)  Posterior Sag Test: negative  Posterolateral Corner: unstable (>15 degrees difference)  Patella   Patellar Apprehension: negative  Passive Patellar Tilt: neutral  Patellar Tracking: normal  Patellar Glide (Quadrants): Lateral - 1 Medial - 2  Q-Angle at 90 degrees: normal  Patellar Grind: negative  J-Sign: J sign absent    Other   Meniscal Cyst: absent  Popliteal (Baker's) Cyst: absent  Sensation: normal    Right Hip Exam     Tests   Julio Cesar: negative  Left Hip Exam     Tests   Julio Cesar: negative          Muscle Strength   Right Lower Extremity   Hip Abduction: 5/5   Quadriceps:  5/5   Hamstrin/5   Left Lower Extremity   Hip Abduction: 5/5   Quadriceps:  5/5   Hamstrin/5     Reflexes     Left Side  Quadriceps:  2+  Achilles:  2+    Right Side   Quadriceps:  2+  Achilles:  2+    Vascular Exam     Right Pulses  Dorsalis Pedis:      2+  Posterior Tibial:      2+        Left Pulses  Dorsalis Pedis:      2+  Posterior Tibial:      2+          RADIOGRAPHS TODAY            Assessment:       Encounter Diagnoses   Name Primary?    Right knee pain, unspecified chronicity Yes    History of total bilateral knee replacement     Internal derangement of right knee     Arthralgia, unspecified joint     Chronic pain of right knee           Plan:       1. IKDC, SF-12 and KOOS was filled out today in clinic.     RTC in 3 months with Dr. Sri Roberts Patient will fill out IKDC, SF-12 and KOOS and bilateral knee series on return.    2. Continue HEP and activities as tolerated                          [facility] patient questionnaires have been collected today.     Home

## 2021-12-03 ENCOUNTER — PATIENT MESSAGE (OUTPATIENT)
Dept: DERMATOLOGY | Facility: CLINIC | Age: 61
End: 2021-12-03
Payer: COMMERCIAL

## 2021-12-07 ENCOUNTER — PATIENT MESSAGE (OUTPATIENT)
Dept: SPORTS MEDICINE | Facility: CLINIC | Age: 61
End: 2021-12-07
Payer: COMMERCIAL

## 2021-12-15 ENCOUNTER — HOSPITAL ENCOUNTER (OUTPATIENT)
Dept: RADIOLOGY | Facility: HOSPITAL | Age: 61
Discharge: HOME | End: 2021-12-15
Attending: PHYSICIAN ASSISTANT
Payer: COMMERCIAL

## 2021-12-15 ENCOUNTER — OFFICE VISIT (OUTPATIENT)
Dept: SPORTS MEDICINE | Facility: CLINIC | Age: 61
End: 2021-12-15
Payer: COMMERCIAL

## 2021-12-15 VITALS
HEIGHT: 62 IN | WEIGHT: 180 LBS | HEART RATE: 89 BPM | DIASTOLIC BLOOD PRESSURE: 89 MMHG | SYSTOLIC BLOOD PRESSURE: 132 MMHG | BODY MASS INDEX: 33.13 KG/M2

## 2021-12-15 DIAGNOSIS — Z96.652 STATUS POST TOTAL LEFT KNEE REPLACEMENT: ICD-10-CM

## 2021-12-15 DIAGNOSIS — M25.562 LEFT KNEE PAIN, UNSPECIFIED CHRONICITY: Primary | ICD-10-CM

## 2021-12-15 DIAGNOSIS — Z96.652 PRESENCE OF LEFT ARTIFICIAL KNEE JOINT: ICD-10-CM

## 2021-12-15 DIAGNOSIS — M54.16 LUMBAR RADICULOPATHY: ICD-10-CM

## 2021-12-15 DIAGNOSIS — M25.562 LEFT KNEE PAIN, UNSPECIFIED CHRONICITY: ICD-10-CM

## 2021-12-15 DIAGNOSIS — T84.093A FAILED TOTAL LEFT KNEE REPLACEMENT, INITIAL ENCOUNTER: ICD-10-CM

## 2021-12-15 LAB
APPEARANCE FLD: NORMAL
BODY FLD TYPE: NORMAL
COLOR FLD: YELLOW
WBC # FLD: 868 /CU MM

## 2021-12-15 PROCEDURE — 87102 FUNGUS ISOLATION CULTURE: CPT | Performed by: PHYSICIAN ASSISTANT

## 2021-12-15 PROCEDURE — 87205 SMEAR GRAM STAIN: CPT | Performed by: PHYSICIAN ASSISTANT

## 2021-12-15 PROCEDURE — 20610 DRAIN/INJ JOINT/BURSA W/O US: CPT | Mod: LT,S$GLB,, | Performed by: PHYSICIAN ASSISTANT

## 2021-12-15 PROCEDURE — 99214 PR OFFICE/OUTPT VISIT, EST, LEVL IV, 30-39 MIN: ICD-10-PCS | Mod: 25,S$GLB,, | Performed by: PHYSICIAN ASSISTANT

## 2021-12-15 PROCEDURE — 73564 X-RAY EXAM KNEE 4 OR MORE: CPT | Mod: TC,50

## 2021-12-15 PROCEDURE — 73564 X-RAY EXAM KNEE 4 OR MORE: CPT | Mod: 26,,, | Performed by: RADIOLOGY

## 2021-12-15 PROCEDURE — 89051 BODY FLUID CELL COUNT: CPT | Performed by: PHYSICIAN ASSISTANT

## 2021-12-15 PROCEDURE — 4010F ACE/ARB THERAPY RXD/TAKEN: CPT | Mod: CPTII,S$GLB,, | Performed by: PHYSICIAN ASSISTANT

## 2021-12-15 PROCEDURE — 73564 XR KNEE ORTHO BILAT WITH FLEXION: ICD-10-PCS | Mod: 26,,, | Performed by: RADIOLOGY

## 2021-12-15 PROCEDURE — 87206 SMEAR FLUORESCENT/ACID STAI: CPT | Performed by: PHYSICIAN ASSISTANT

## 2021-12-15 PROCEDURE — 87075 CULTR BACTERIA EXCEPT BLOOD: CPT | Performed by: PHYSICIAN ASSISTANT

## 2021-12-15 PROCEDURE — 4010F PR ACE/ARB THEARPY RXD/TAKEN: ICD-10-PCS | Mod: CPTII,S$GLB,, | Performed by: PHYSICIAN ASSISTANT

## 2021-12-15 PROCEDURE — 20610 PR DRAIN/INJECT LARGE JOINT/BURSA: ICD-10-PCS | Mod: LT,S$GLB,, | Performed by: PHYSICIAN ASSISTANT

## 2021-12-15 PROCEDURE — 87116 MYCOBACTERIA CULTURE: CPT | Performed by: PHYSICIAN ASSISTANT

## 2021-12-15 PROCEDURE — 99999 PR PBB SHADOW E&M-EST. PATIENT-LVL IV: CPT | Mod: PBBFAC,,, | Performed by: PHYSICIAN ASSISTANT

## 2021-12-15 PROCEDURE — 87070 CULTURE OTHR SPECIMN AEROBIC: CPT | Performed by: PHYSICIAN ASSISTANT

## 2021-12-15 PROCEDURE — 99999 PR PBB SHADOW E&M-EST. PATIENT-LVL IV: ICD-10-PCS | Mod: PBBFAC,,, | Performed by: PHYSICIAN ASSISTANT

## 2021-12-15 PROCEDURE — 99214 OFFICE O/P EST MOD 30 MIN: CPT | Mod: 25,S$GLB,, | Performed by: PHYSICIAN ASSISTANT

## 2021-12-16 LAB
GRAM STN SPEC: NORMAL
GRAM STN SPEC: NORMAL

## 2021-12-19 LAB — BACTERIA SPEC AEROBE CULT: NO GROWTH

## 2021-12-20 ENCOUNTER — TELEPHONE (OUTPATIENT)
Dept: SPORTS MEDICINE | Facility: CLINIC | Age: 61
End: 2021-12-20
Payer: COMMERCIAL

## 2021-12-20 ENCOUNTER — HOSPITAL ENCOUNTER (OUTPATIENT)
Dept: RADIOLOGY | Facility: HOSPITAL | Age: 61
Discharge: HOME | End: 2021-12-20
Attending: PHYSICIAN ASSISTANT
Payer: COMMERCIAL

## 2021-12-20 DIAGNOSIS — M25.562 LEFT KNEE PAIN, UNSPECIFIED CHRONICITY: ICD-10-CM

## 2021-12-20 DIAGNOSIS — T84.093A FAILED TOTAL LEFT KNEE REPLACEMENT, INITIAL ENCOUNTER: ICD-10-CM

## 2021-12-20 DIAGNOSIS — Z96.652 STATUS POST TOTAL LEFT KNEE REPLACEMENT: ICD-10-CM

## 2021-12-20 DIAGNOSIS — Z96.652 PRESENCE OF LEFT ARTIFICIAL KNEE JOINT: ICD-10-CM

## 2021-12-20 PROCEDURE — 73700 CT LOWER EXTREMITY W/O DYE: CPT | Mod: TC,PN,LT

## 2021-12-20 PROCEDURE — 73700 CT LOWER EXTREMITY W/O DYE: CPT | Mod: 26,LT,S$GLB, | Performed by: RADIOLOGY

## 2021-12-20 PROCEDURE — 73700 CT KNEE WITHOUT CONTRAST LEFT: ICD-10-PCS | Mod: 26,LT,S$GLB, | Performed by: RADIOLOGY

## 2021-12-27 LAB — BACTERIA SPEC ANAEROBE CULT: NORMAL

## 2022-01-17 LAB — FUNGUS SPEC CULT: NORMAL

## 2022-01-20 ENCOUNTER — TELEPHONE (OUTPATIENT)
Dept: SPORTS MEDICINE | Facility: CLINIC | Age: 62
End: 2022-01-20
Payer: COMMERCIAL

## 2022-01-20 NOTE — TELEPHONE ENCOUNTER
LVM for patient to get her set up with revision TKA. Waiting on call back.     Mariam Cardenas   Clinical Assistant to Dr. Sri Roberts

## 2022-01-25 ENCOUNTER — TELEPHONE (OUTPATIENT)
Dept: DERMATOLOGY | Facility: CLINIC | Age: 62
End: 2022-01-25
Payer: COMMERCIAL

## 2022-01-25 NOTE — TELEPHONE ENCOUNTER
Attempted to get in contact with pt in regards to r/s 3/10 appt due to provider being out. LVM to give office a call back.

## 2022-01-26 ENCOUNTER — TELEPHONE (OUTPATIENT)
Dept: NEUROSURGERY | Facility: CLINIC | Age: 62
End: 2022-01-26
Payer: COMMERCIAL

## 2022-01-26 DIAGNOSIS — M54.50 LOW BACK PAIN, UNSPECIFIED BACK PAIN LATERALITY, UNSPECIFIED CHRONICITY, UNSPECIFIED WHETHER SCIATICA PRESENT: Primary | ICD-10-CM

## 2022-01-28 ENCOUNTER — TELEPHONE (OUTPATIENT)
Dept: DERMATOLOGY | Facility: CLINIC | Age: 62
End: 2022-01-28
Payer: COMMERCIAL

## 2022-01-28 ENCOUNTER — HOSPITAL ENCOUNTER (OUTPATIENT)
Dept: RADIOLOGY | Facility: HOSPITAL | Age: 62
Discharge: HOME | End: 2022-01-28
Attending: NEUROLOGICAL SURGERY
Payer: COMMERCIAL

## 2022-01-28 DIAGNOSIS — M54.50 LOW BACK PAIN, UNSPECIFIED BACK PAIN LATERALITY, UNSPECIFIED CHRONICITY, UNSPECIFIED WHETHER SCIATICA PRESENT: ICD-10-CM

## 2022-01-28 PROCEDURE — 72110 XR LUMBAR SPINE AP AND LAT WITH FLEX/EXT: ICD-10-PCS | Mod: 26,,, | Performed by: RADIOLOGY

## 2022-01-28 PROCEDURE — 72110 X-RAY EXAM L-2 SPINE 4/>VWS: CPT | Mod: 26,,, | Performed by: RADIOLOGY

## 2022-01-28 PROCEDURE — 72110 X-RAY EXAM L-2 SPINE 4/>VWS: CPT | Mod: TC,PN

## 2022-01-28 NOTE — TELEPHONE ENCOUNTER
Spoke with pt in regards to r/s 3/10-PM appt due to [facility] not being in that day. Pt agreed to r/s to 3/10-AM.

## 2022-02-03 LAB
ACID FAST MOD KINY STN SPEC: NORMAL
MYCOBACTERIUM SPEC QL CULT: NORMAL

## 2022-02-17 ENCOUNTER — TELEPHONE (OUTPATIENT)
Dept: NEUROSURGERY | Facility: CLINIC | Age: 62
End: 2022-02-17
Payer: COMMERCIAL

## 2022-02-17 NOTE — TELEPHONE ENCOUNTER
Called patient to informed her of date and time changes for her appointment with Марина Hanna PA-C on 2/28/2022, no answer message left and slip mailed. Patient was advised that the provider would be out and her appointment had been changed to 3/3/2022.

## 2022-03-09 ENCOUNTER — HOSPITAL ENCOUNTER (OUTPATIENT)
Dept: RADIOLOGY | Facility: HOSPITAL | Age: 62
Discharge: HOME | End: 2022-03-09
Attending: ORTHOPAEDIC SURGERY
Payer: COMMERCIAL

## 2022-03-09 ENCOUNTER — OFFICE VISIT (OUTPATIENT)
Dept: SPORTS MEDICINE | Facility: CLINIC | Age: 62
End: 2022-03-09
Payer: COMMERCIAL

## 2022-03-09 VITALS
WEIGHT: 185 LBS | DIASTOLIC BLOOD PRESSURE: 86 MMHG | SYSTOLIC BLOOD PRESSURE: 124 MMHG | BODY MASS INDEX: 34.04 KG/M2 | HEIGHT: 62 IN | HEART RATE: 77 BPM

## 2022-03-09 DIAGNOSIS — T84.093A FAILED TOTAL LEFT KNEE REPLACEMENT, INITIAL ENCOUNTER: Primary | ICD-10-CM

## 2022-03-09 DIAGNOSIS — G89.29 CHRONIC PAIN OF RIGHT KNEE: ICD-10-CM

## 2022-03-09 DIAGNOSIS — M25.561 CHRONIC PAIN OF RIGHT KNEE: ICD-10-CM

## 2022-03-09 DIAGNOSIS — Z96.653 HISTORY OF TOTAL BILATERAL KNEE REPLACEMENT: ICD-10-CM

## 2022-03-09 DIAGNOSIS — T84.093A FAILED TOTAL LEFT KNEE REPLACEMENT, INITIAL ENCOUNTER: ICD-10-CM

## 2022-03-09 LAB
APPEARANCE FLD: NORMAL
BODY FLD TYPE: NORMAL
BODY FLD TYPE: NORMAL
COLOR FLD: YELLOW
CRYSTALS FLD MICRO: NEGATIVE
GRAM STN SPEC: NORMAL
GRAM STN SPEC: NORMAL
LYMPHOCYTES NFR FLD MANUAL: 34 %
MONOS+MACROS NFR FLD MANUAL: 64 %
NEUTROPHILS NFR FLD MANUAL: 2 %
WBC # FLD: 703 /CU MM

## 2022-03-09 PROCEDURE — 87205 SMEAR GRAM STAIN: CPT | Performed by: ORTHOPAEDIC SURGERY

## 2022-03-09 PROCEDURE — 99999 PR PBB SHADOW E&M-EST. PATIENT-LVL V: ICD-10-PCS | Mod: PBBFAC,,, | Performed by: ORTHOPAEDIC SURGERY

## 2022-03-09 PROCEDURE — 87070 CULTURE OTHR SPECIMN AEROBIC: CPT | Performed by: ORTHOPAEDIC SURGERY

## 2022-03-09 PROCEDURE — 3074F PR MOST RECENT SYSTOLIC BLOOD PRESSURE < 130 MM HG: ICD-10-PCS | Mod: CPTII,S$GLB,, | Performed by: ORTHOPAEDIC SURGERY

## 2022-03-09 PROCEDURE — 87015 SPECIMEN INFECT AGNT CONCNTJ: CPT | Performed by: ORTHOPAEDIC SURGERY

## 2022-03-09 PROCEDURE — 3074F SYST BP LT 130 MM HG: CPT | Mod: CPTII,S$GLB,, | Performed by: ORTHOPAEDIC SURGERY

## 2022-03-09 PROCEDURE — 99214 PR OFFICE/OUTPT VISIT, EST, LEVL IV, 30-39 MIN: ICD-10-PCS | Mod: 25,S$GLB,, | Performed by: ORTHOPAEDIC SURGERY

## 2022-03-09 PROCEDURE — 3079F DIAST BP 80-89 MM HG: CPT | Mod: CPTII,S$GLB,, | Performed by: ORTHOPAEDIC SURGERY

## 2022-03-09 PROCEDURE — 1159F MED LIST DOCD IN RCRD: CPT | Mod: CPTII,S$GLB,, | Performed by: ORTHOPAEDIC SURGERY

## 2022-03-09 PROCEDURE — 99999 PR PBB SHADOW E&M-EST. PATIENT-LVL V: CPT | Mod: PBBFAC,,, | Performed by: ORTHOPAEDIC SURGERY

## 2022-03-09 PROCEDURE — 73564 X-RAY EXAM KNEE 4 OR MORE: CPT | Mod: TC,50

## 2022-03-09 PROCEDURE — 89051 BODY FLUID CELL COUNT: CPT | Performed by: ORTHOPAEDIC SURGERY

## 2022-03-09 PROCEDURE — 3008F PR BODY MASS INDEX (BMI) DOCUMENTED: ICD-10-PCS | Mod: CPTII,S$GLB,, | Performed by: ORTHOPAEDIC SURGERY

## 2022-03-09 PROCEDURE — 87116 MYCOBACTERIA CULTURE: CPT | Performed by: ORTHOPAEDIC SURGERY

## 2022-03-09 PROCEDURE — 1160F PR REVIEW ALL MEDS BY PRESCRIBER/CLIN PHARMACIST DOCUMENTED: ICD-10-PCS | Mod: CPTII,S$GLB,, | Performed by: ORTHOPAEDIC SURGERY

## 2022-03-09 PROCEDURE — 1160F RVW MEDS BY RX/DR IN RCRD: CPT | Mod: CPTII,S$GLB,, | Performed by: ORTHOPAEDIC SURGERY

## 2022-03-09 PROCEDURE — 4010F PR ACE/ARB THEARPY RXD/TAKEN: ICD-10-PCS | Mod: CPTII,S$GLB,, | Performed by: ORTHOPAEDIC SURGERY

## 2022-03-09 PROCEDURE — 87206 SMEAR FLUORESCENT/ACID STAI: CPT | Performed by: ORTHOPAEDIC SURGERY

## 2022-03-09 PROCEDURE — 3079F PR MOST RECENT DIASTOLIC BLOOD PRESSURE 80-89 MM HG: ICD-10-PCS | Mod: CPTII,S$GLB,, | Performed by: ORTHOPAEDIC SURGERY

## 2022-03-09 PROCEDURE — 89060 EXAM SYNOVIAL FLUID CRYSTALS: CPT | Performed by: ORTHOPAEDIC SURGERY

## 2022-03-09 PROCEDURE — 99214 OFFICE O/P EST MOD 30 MIN: CPT | Mod: 25,S$GLB,, | Performed by: ORTHOPAEDIC SURGERY

## 2022-03-09 PROCEDURE — 87075 CULTR BACTERIA EXCEPT BLOOD: CPT | Performed by: ORTHOPAEDIC SURGERY

## 2022-03-09 PROCEDURE — 4010F ACE/ARB THERAPY RXD/TAKEN: CPT | Mod: CPTII,S$GLB,, | Performed by: ORTHOPAEDIC SURGERY

## 2022-03-09 PROCEDURE — 73564 X-RAY EXAM KNEE 4 OR MORE: CPT | Mod: 26,,, | Performed by: RADIOLOGY

## 2022-03-09 PROCEDURE — 73564 XR KNEE ORTHO BILAT WITH FLEXION: ICD-10-PCS | Mod: 26,,, | Performed by: RADIOLOGY

## 2022-03-09 PROCEDURE — 1159F PR MEDICATION LIST DOCUMENTED IN MEDICAL RECORD: ICD-10-PCS | Mod: CPTII,S$GLB,, | Performed by: ORTHOPAEDIC SURGERY

## 2022-03-09 PROCEDURE — 87102 FUNGUS ISOLATION CULTURE: CPT | Performed by: ORTHOPAEDIC SURGERY

## 2022-03-09 PROCEDURE — 3008F BODY MASS INDEX DOCD: CPT | Mod: CPTII,S$GLB,, | Performed by: ORTHOPAEDIC SURGERY

## 2022-03-09 RX ORDER — TRAZODONE HYDROCHLORIDE 50 MG/1
50 TABLET ORAL NIGHTLY PRN
COMMUNITY
Start: 2022-02-18

## 2022-03-09 RX ORDER — ROSUVASTATIN CALCIUM 10 MG/1
10 TABLET, COATED ORAL DAILY
COMMUNITY
Start: 2022-02-25

## 2022-03-09 RX ORDER — TRIAMCINOLONE ACETONIDE 40 MG/ML
80 INJECTION, SUSPENSION INTRA-ARTICULAR; INTRAMUSCULAR
Status: DISCONTINUED | OUTPATIENT
Start: 2022-03-09 | End: 2022-03-09 | Stop reason: HOSPADM

## 2022-03-09 NOTE — PROCEDURES
"Large Joint Aspiration/Injection: L knee    Date/Time: 3/9/2022 12:30 PM  Performed by: Sri Roberts MD  Authorized by: Sri Roberts MD     Consent Done?:  Yes (Verbal)  Indications:  Pain  Site marked: the procedure site was marked    Timeout: prior to procedure the correct patient, procedure, and site was verified    Prep: patient was prepped and draped in usual sterile fashion    Local anesthesia used?: No    Local anesthetic:  Topical anesthetic (naropin 0.2%)  Anesthetic total (ml):  3      Details:  Needle Size:  22 G  Ultrasonic Guidance for needle placement?: Yes    Images are saved and documented.  Approach:  Anterolateral (superolateral)  Location:  Knee  Site:  L knee  Aspirate amount (mL):  22  Aspirate:  Clear  Lab: fluid sent for laboratory analysis (culture and synovasure)    Patient tolerance:  Patient tolerated the procedure well with no immediate complications     Description of ultrasound utilization for needle guidance:   Ultrasound guidance used for needle localization. Images saved and stored for documentation. The knee joint was visualized. Dynamic visualization of the 22g x 1.5" needle was continuous throughout the procedure.       "

## 2022-03-09 NOTE — PROGRESS NOTES
Subjective:          Chief Complaint: Carleen Kincaid is a 61 y.o. female who had concerns including Pain of the Left Knee.    Patient presents to our clinic today s/p the below procedures. She was seen in our office in December by our PA, Breezy Godinez and was supposed to undergo revision total knee replacement. Unfortunately, her son passed away unexpectedly at the end of December and she was never able to get scheduled for surgery. She is here today to get scheduled for revision TKA.     Patient presents 5 years 3 months s/p left knee replacement.  She reports she has been still a significant well up until a couple weeks ago.  She started experience pain lateral joint line.  She also have episodes the instability and extreme swelling.  Denies any trauma.      DATE OF PROCEDURE:  12/12/2017     ATTENDING SURGEON:  Sri Roberts M.D.     CO-SURGEON:  Santiago Qiu M.D.     FIRST ASSISTANT:  SMA Sreekanth-assistant.     SECOND ASSISTANT:  Jossue Irene M.D.     PREOPERATIVE DIAGNOSIS:  Failed right total knee replacement.     POSTOPERATIVE DIAGNOSIS:  Failed right total knee replacement.     PROCEDURES PERFORMED:  Right complex revision total knee replacement.    Pain  Associated symptoms include joint swelling. Pertinent negatives include no abdominal pain, chest pain, chills, congestion, coughing, fever, nausea, numbness, rash, sore throat or vomiting.   Back Pain  Pertinent negatives include no abdominal pain, chest pain, fever, numbness or paresthesias.       Review of Systems   Constitutional: Negative for chills, fever and night sweats.   HENT: Negative for congestion, hearing loss and sore throat.    Eyes: Negative for blurred vision, discharge, double vision and visual disturbance.   Cardiovascular: Negative for chest pain, leg swelling, palpitations and syncope.   Respiratory: Negative for cough and shortness of breath.    Endocrine: Negative for cold intolerance, heat intolerance and polyuria.    Hematologic/Lymphatic: Negative for bleeding problem.   Skin: Negative for dry skin and rash.   Musculoskeletal: Positive for back pain, joint pain and joint swelling. Negative for muscle cramps and muscle weakness.   Gastrointestinal: Negative for abdominal pain, melena, nausea and vomiting.   Genitourinary: Negative for hematuria.   Neurological: Negative for focal weakness, loss of balance, numbness and paresthesias.   Psychiatric/Behavioral: Negative for altered mental status.                   Objective:        General: Carleen is well-developed, well-nourished, appears stated age, in no acute distress, alert and oriented to time, place and person.     General    Vitals reviewed.  Constitutional: She is oriented to person, place, and time. She appears well-developed and well-nourished. No distress.   HENT:   Mouth/Throat: No oropharyngeal exudate.   Eyes: Right eye exhibits no discharge. Left eye exhibits no discharge.   Cardiovascular: Normal rate.    Pulmonary/Chest: Effort normal and breath sounds normal. No respiratory distress.   Neurological: She is alert and oriented to person, place, and time. She has normal reflexes. No cranial nerve deficit. Coordination abnormal.   Psychiatric: She has a normal mood and affect. Her behavior is normal. Judgment and thought content normal.     General Musculoskeletal Exam   Gait: normal and antalgic   Pelvic Obliquity: none      Right Knee Exam     Inspection   Alignment:  normal  Erythema: absent  Scars: present  Swelling: absent  Effusion: absent  Deformity: absent  Bruising: absent    Tenderness   The patient is experiencing no tenderness.     Range of Motion   Extension: 0   Right knee flexion: 125.     Tests   Meniscus   Sonal:  Medial - negative Lateral - negative  Ligament Examination Lachman: normal (-1 to 2mm) PCL-Posterior Drawer: normal (0 to 2mm)     MCL - Valgus: normal (0 to 2mm)  LCL - Varus: normalPivot Shift: normal (Equal)Reverse Pivot Shift:  normal (Equal)Dial Test at 30 degrees: normal (< 5 degrees)Dial Test at 90 degrees: normal (< 5 degrees)  Posterior Sag Test: negative  Posterolateral Corner: unstable (>15 degrees difference)  Patella   Patellar apprehension: negative  Passive Patellar Tilt: neutral  Patellar Tracking: normal  Patellar Glide (quadrants): Lateral - 1   Medial - 2  Q-Angle at 90 degrees: normal  Patellar Grind: negative  J-Sign: none    Other   Meniscal Cyst: absent  Popliteal (Baker's) Cyst: absent  Sensation: normal    Left Knee Exam     Inspection   Alignment:  normal  Erythema: absent  Scars: present  Swelling: absent  Effusion: present  Deformity: absent  Bruising: absent    Tenderness   The patient tender to palpation of the lateral joint line and patella.    Range of Motion   Extension: 0   Flexion:  100 (+pain) abnormal     Tests   Meniscus   Sonal:  Medial - negative Lateral - negative  Stability Lachman: normal (-1 to 2mm) PCL-Posterior Drawer: normal (0 to 2mm)  MCL - Valgus: normal (0 to 2mm)  LCL - Varus: normal (0 to 2mm)Pivot Shift: normal (Equal)Reverse Pivot Shift: normal (Equal)Dial Test at 30 degrees: normal (< 5 degrees)Dial Test at 90 degrees: normal (< 5 degrees)  Posterior Sag Test: negative  Posterolateral Corner: unstable (>15 degrees difference)  Patella   Patellar apprehension: negative  Passive Patellar Tilt: neutral  Patellar Tracking: normal  Patellar Glide (Quadrants): Lateral - 1 Medial - 2  Q-Angle at 90 degrees: normal  Patellar Grind: negative  J-Sign: J sign absent    Other   Meniscal Cyst: absent  Popliteal (Baker's) Cyst: absent  Sensation: normal    Right Hip Exam     Inspection   Scars: absent  Swelling: absent  Bruising: absent  No deformity of hip.  Quadriceps Atrophy:  Negative  Erythema: absent    Range of Motion   Abduction:  40 normal   Adduction:  20 normal   Extension:  0 normal   Flexion:  120 normal   External rotation:  60 normal   Internal rotation:  15 normal     Tests   Pain w/  forced internal rotation (AIDEN): absent  Pain w/ forced external rotation (FADIR): absent  Julio Cesar: negative  Trendelenburg Test: negative  Circumduction test: negative  Stinchfield test: negative  Log Roll: negative  Snapping Hip (internal): negative  Step-down test: negative  Resisted sit-up pain: negative  Unresisted sit-up pain: negative  Resisted sit-up pain with adductor contraction pain: negative    Other   Sensation: normal  Left Hip Exam     Inspection   Scars: absent  Swelling: absent  No deformity of hip.  Quadriceps Atrophy:  negative  Erythema: absent  Bruising: absent    Range of Motion   Abduction:  40 normal   Adduction:  20 normal   Extension:  0 normal   Flexion:  120 normal   External rotation:  60 normal   Internal rotation: 15 normal     Tests   Pain w/ forced internal rotation (AIDEN): absent  Pain w/ forced external rotation (FADIR): absent  Julio Cesar: negative  Trendelenburg Test: negative  Circumduction test: negative  Stinchfield test: negative  Log Roll: negative  Snapping Hip (internal): negative  Step-down test: negative  Resisted sit-up pain: negative  Resisted sit-up pain with adductor contraction pain: negative  Unresisted sit-up pain: negative    Other   Sensation: normal      Back (L-Spine & T-Spine) / Neck (C-Spine) Exam   Back exam is normal.    Back (L-Spine & T-Spine) Tests   Right Side Tests  Straight leg raise: + at 70 deg               Muscle Strength   Right Lower Extremity   Hip Abduction: 5/5   Hip Adduction: 5/5   Hip Flexion: 5/5   Quadriceps:  5/5   Hamstrin/5   Left Lower Extremity   Hip Abduction: 5/5   Hip Adduction: 5/5   Hip Flexion: 5/5   Quadriceps:  4/5   Hamstrin/5     Reflexes     Left Side  Achilles:  2+  Quadriceps:  2+    Right Side   Achilles:  2+  Quadriceps:  2+    Vascular Exam     Right Pulses  Dorsalis Pedis:      2+  Posterior Tibial:      2+        Left Pulses  Dorsalis Pedis:      2+  Posterior Tibial:      2+        Capillary Refill  Right  Hand: normal capillary refill  Left Hand: normal capillary refill    Edema  Right Upper Leg: absent  Left Upper Leg: absent    X-Ray Lumbar Spine Ap Lateral w/Flex Ext  Narrative: EXAMINATION:  XR LUMBAR SPINE AP AND LAT WITH FLEX/EXT    CLINICAL HISTORY:  LOWER BACK PAIN;  Low back pain, unspecified    TECHNIQUE:  AP and lateral views as well as lateral flexion and extension images are performed through the lumbar spine.    COMPARISON:  08/28/2019    FINDINGS:  Leftward convexity of the lower lumbar spine.  Postoperative changes with posterior fusion L4-L5 by means of bilateral parallel rods and bilateral screws.  Interbody device in the L4-5 disc space.  Severe disc space narrowing L4-5 and mild grade 1 spondylolisthesis L4-5 with L4 anteriorly positioned relative to L5 by 4-5 mm.  Severe degenerative change and mildly severe disc space narrowing also noted L3-L4 and very slight retrolisthesis L3-L4 by approximately 3-4 mm.  Sick a shin L3-L4.  The slight positional changes are not significantly changed on the neutral, flexion, extension views without abnormal translational motion seen.  Vertebral body heights are maintained.  Mild lordosis in the lower lumbar spine on the neutral view with flexion occurring primarily near the thoracolumbar junction on the flexion view and lordosis on the extension view slightly greater than on the neutral view.    The appearance is similar to the prior study 08/28/2019.  L3-L4 disc space appears slightly narrowed today.  Impression: Postoperative change, degenerative change, disc space narrowing with findings as detailed above    Electronically signed by: Carmina Kennedy MD  Date:    01/28/2022  Time:    12:54      Left failed TKR with loosened tibial component noted; Stable right knee revision TKA        Assessment:       Encounter Diagnoses   Name Primary?    Failed total left knee replacement, initial encounter Yes    Chronic pain of right knee     History of total bilateral  knee replacement           Plan:       1. IKDC, SF-12 and KOOS was filled out today in clinic.     RTC in 3 weeks with Mid-level provider for pre-operative exam. Patient will fill out IKDC, SF-12 and KOOS on return.    2. Medications: Refills of the following Rx were sent to patients preferred Pharmacy:  [facility]    3. Physical Therapy: Continue/Begin: Begin at [facility]    4. HEP: N/A    5. Procedures/Procedural Planning:   We reviewed with Carleen today, the pathology and natural history of her diagnosis. We have discussed a variety of treatment options including medications, physical therapy and other alternative treatments. I also explained the indications, risks and benefits of surgery. After discussion, Carleen decided to proceed with surgery. The decision was made to go forward with:     Left Knee:    Left total knee arthroplasty revision (Depuy) - ATTUNE    Clearance Medically Necessary: X Yes   - PCP    Pre-Op Center Clearance Medically Necessary: x Yes       The details of the surgical procedure were explained, including the location of probable incisions and a description of likely hardware and/or grafts to be used.  The patient understands the likely convalescence after surgery.  Also, we have thoroughly discussed the risks, benefits and alternatives to surgery, including, but not limited to, the risk of infection, joint stiffness, blood clot (including DVT and/or pulmonary embolus), neurologic and vascular injury.  It was explained that, if tissue has been repaired or reconstructed, there is a chance of failure, which may require further management.      All of the patient's questions were answered and informed consent was obtained. The patient will contact us if they have any questions or concerns in the interim.      6. DME: N/A    7. Work/Sport Status: retired    8. Visit Summary: Needs revision left TKA, booking sheet filled out and patient scheduled. Labs were sent for Synovasure and outpatient  cultures and ESR and CRP. No CSI injection today as patient is scheduled for surgery on 4/5.                 [facility] patient questionnaires have been collected today.

## 2022-03-10 ENCOUNTER — TELEPHONE (OUTPATIENT)
Dept: SPORTS MEDICINE | Facility: CLINIC | Age: 62
End: 2022-03-10
Payer: COMMERCIAL

## 2022-03-10 ENCOUNTER — PATIENT MESSAGE (OUTPATIENT)
Dept: SPORTS MEDICINE | Facility: CLINIC | Age: 62
End: 2022-03-10
Payer: COMMERCIAL

## 2022-03-10 ENCOUNTER — TELEPHONE (OUTPATIENT)
Dept: PREADMISSION TESTING | Facility: HOSPITAL | Age: 62
End: 2022-03-10
Payer: COMMERCIAL

## 2022-03-10 DIAGNOSIS — Z96.652 STATUS POST TOTAL LEFT KNEE REPLACEMENT: ICD-10-CM

## 2022-03-10 DIAGNOSIS — Z96.653 HISTORY OF TOTAL BILATERAL KNEE REPLACEMENT: ICD-10-CM

## 2022-03-10 DIAGNOSIS — T84.093A FAILED TOTAL LEFT KNEE REPLACEMENT, INITIAL ENCOUNTER: Primary | ICD-10-CM

## 2022-03-10 LAB — PATH INTERP FLD-IMP: NORMAL

## 2022-03-10 NOTE — TELEPHONE ENCOUNTER
----- Message from Mariam Cardenas sent at 3/10/2022  8:48 AM CST -----  Regarding: PCP clearance for [facility] surgical patient-Dr. Sri Roberts  Hi,        Would you please reach out and schedule PCP clearance appointment with Dr. Hunt or either his NPs (Jsesica Caro & Clarke Crawford) for  surgical patient dos 4/5/2022.       Thank you so much,    Mariam Cardenas   Clinical Assistant to Dr. Sri Roberts

## 2022-03-14 LAB — BACTERIA SPEC AEROBE CULT: NO GROWTH

## 2022-03-17 LAB — BACTERIA SPEC ANAEROBE CULT: NORMAL

## 2022-03-19 DIAGNOSIS — M79.605 PAIN OF LEFT LOWER EXTREMITY: ICD-10-CM

## 2022-03-19 DIAGNOSIS — I10 HYPERTENSION, UNSPECIFIED TYPE: Primary | ICD-10-CM

## 2022-03-19 NOTE — ANESTHESIA PAT ROS NOTE
03/19/2022  Carleen Kincaid is a 61 y.o., female.      Pre-op Assessment          Review of Systems         Anesthesia Assessment: Preoperative EQUATION    Planned Procedure: Procedure(s) (LRB):  ARTHROPLASTY, KNEE (Left)  Requested Anesthesia Type:General  Surgeon: Sri Roberts MD  Service: Orthopedics  Known or anticipated Date of Surgery:4/5/2022    Surgeon notes: reviewed    Electronic QUestionnaire Assessment completed via nurse interview with patient.        Triage considerations:     The patient has no apparent active cardiac condition (No unstable coronary Syndrome such as severe unstable angina or recent [<1 month] myocardial infarction, decompensated CHF, severe valvular   disease or significant arrhythmia)    Previous anesthesia records:GETA, Nerve block for post-op pain and R-TKR 12/2017 Sanchez; Inserted by: CRNA; Airway Device: Endotracheal Tube; Mask Ventilation: Easy; Intubated: Postinduction; Blade: Kira #3; Airway Device Size: 7.5; Style: Cuffed; Cuff Inflation: Minimal occlusive pressure; Inflation Amount: 4; Placement Verified By: Auscultation, Capnometry, ETT Condensation; Grade: Grade I; Complicating Factors: Obesity    Last PCP note: outside [facility]   Subspecialty notes: Dermatology, for Psoriasis-Off Humira since 1/5/2022    Other important co-morbidities: HLD, HTN, Obesity and Stroke?, anemia, Psoriasis, s/p R TKR 8/2015, L-TKR 8/2016, revision R-TKR 12/2017 all done with Dr. Morgan      Tests already available:  No recent tests.             Instructions given. (See in Nurse's note)    Optimization:  Anesthesia Preop Clinic Assessment  Indicated    Medical Opinion Indicated       Sub-specialist consult indicated:   TBD       Plan:    Testing:  BMP, EKG, Hematology Profile and PT/INR   Pre-anesthesia  visit       Visit focus: possible regional anesthesia and/or nerve block       Consultation:POC NP Josh 4/1 for medical and anesthesia evaluation     Patient  has previously scheduled Medical Appointment:    Navigation: Tests Scheduled.              Consults scheduled.             Results will be tracked by Preop Clinic.

## 2022-03-21 ENCOUNTER — TELEPHONE (OUTPATIENT)
Dept: PREADMISSION TESTING | Facility: HOSPITAL | Age: 62
End: 2022-03-21
Payer: COMMERCIAL

## 2022-03-21 NOTE — TELEPHONE ENCOUNTER
----- Message from Melissa Zuñiga RN sent at 3/19/2022  3:30 PM CDT -----  Regarding: POC tests  Please schedule patient tests.  Hematology profile, BMP, PT/INR, EKG.      Thanks,   Melissa

## 2022-03-24 ENCOUNTER — OFFICE VISIT (OUTPATIENT)
Dept: NEUROSURGERY | Facility: CLINIC | Age: 62
End: 2022-03-24
Payer: COMMERCIAL

## 2022-03-24 VITALS
HEART RATE: 80 BPM | BODY MASS INDEX: 34.04 KG/M2 | HEIGHT: 62 IN | SYSTOLIC BLOOD PRESSURE: 146 MMHG | DIASTOLIC BLOOD PRESSURE: 91 MMHG | WEIGHT: 185 LBS

## 2022-03-24 DIAGNOSIS — M54.16 LUMBAR RADICULOPATHY: ICD-10-CM

## 2022-03-24 DIAGNOSIS — Z98.1 S/P LUMBAR FUSION: ICD-10-CM

## 2022-03-24 DIAGNOSIS — I63.9 CEREBROVASCULAR ACCIDENT (CVA), UNSPECIFIED MECHANISM: Primary | ICD-10-CM

## 2022-03-24 PROCEDURE — 3080F DIAST BP >= 90 MM HG: CPT | Mod: CPTII,S$GLB,, | Performed by: PHYSICIAN ASSISTANT

## 2022-03-24 PROCEDURE — 99204 PR OFFICE/OUTPT VISIT, NEW, LEVL IV, 45-59 MIN: ICD-10-PCS | Mod: S$GLB,,, | Performed by: PHYSICIAN ASSISTANT

## 2022-03-24 PROCEDURE — 99204 OFFICE O/P NEW MOD 45 MIN: CPT | Mod: S$GLB,,, | Performed by: PHYSICIAN ASSISTANT

## 2022-03-24 PROCEDURE — 3077F SYST BP >= 140 MM HG: CPT | Mod: CPTII,S$GLB,, | Performed by: PHYSICIAN ASSISTANT

## 2022-03-24 PROCEDURE — 4010F ACE/ARB THERAPY RXD/TAKEN: CPT | Mod: CPTII,S$GLB,, | Performed by: PHYSICIAN ASSISTANT

## 2022-03-24 PROCEDURE — 99999 PR PBB SHADOW E&M-EST. PATIENT-LVL V: CPT | Mod: PBBFAC,,, | Performed by: PHYSICIAN ASSISTANT

## 2022-03-24 PROCEDURE — 3008F PR BODY MASS INDEX (BMI) DOCUMENTED: ICD-10-PCS | Mod: CPTII,S$GLB,, | Performed by: PHYSICIAN ASSISTANT

## 2022-03-24 PROCEDURE — 1159F PR MEDICATION LIST DOCUMENTED IN MEDICAL RECORD: ICD-10-PCS | Mod: CPTII,S$GLB,, | Performed by: PHYSICIAN ASSISTANT

## 2022-03-24 PROCEDURE — 3008F BODY MASS INDEX DOCD: CPT | Mod: CPTII,S$GLB,, | Performed by: PHYSICIAN ASSISTANT

## 2022-03-24 PROCEDURE — 3080F PR MOST RECENT DIASTOLIC BLOOD PRESSURE >= 90 MM HG: ICD-10-PCS | Mod: CPTII,S$GLB,, | Performed by: PHYSICIAN ASSISTANT

## 2022-03-24 PROCEDURE — 99999 PR PBB SHADOW E&M-EST. PATIENT-LVL V: ICD-10-PCS | Mod: PBBFAC,,, | Performed by: PHYSICIAN ASSISTANT

## 2022-03-24 PROCEDURE — 1159F MED LIST DOCD IN RCRD: CPT | Mod: CPTII,S$GLB,, | Performed by: PHYSICIAN ASSISTANT

## 2022-03-24 PROCEDURE — 4010F PR ACE/ARB THEARPY RXD/TAKEN: ICD-10-PCS | Mod: CPTII,S$GLB,, | Performed by: PHYSICIAN ASSISTANT

## 2022-03-24 PROCEDURE — 3077F PR MOST RECENT SYSTOLIC BLOOD PRESSURE >= 140 MM HG: ICD-10-PCS | Mod: CPTII,S$GLB,, | Performed by: PHYSICIAN ASSISTANT

## 2022-03-24 RX ORDER — ONDANSETRON 4 MG/1
4 TABLET, ORALLY DISINTEGRATING ORAL 2 TIMES DAILY PRN
Status: ON HOLD | COMMUNITY
Start: 2022-02-16 | End: 2022-04-05 | Stop reason: HOSPADM

## 2022-03-24 RX ORDER — LISINOPRIL 20 MG/1
20 TABLET ORAL DAILY
COMMUNITY
Start: 2022-01-09

## 2022-03-24 RX ORDER — SERTRALINE HYDROCHLORIDE 100 MG/1
100 TABLET, FILM COATED ORAL DAILY
Status: ON HOLD | COMMUNITY
Start: 2022-02-17 | End: 2022-07-13

## 2022-03-24 RX ORDER — SUMATRIPTAN SUCCINATE 100 MG/1
100 TABLET ORAL
COMMUNITY
Start: 2022-02-16

## 2022-03-24 NOTE — PROGRESS NOTES
Neurosurgery  History & Physical    SUBJECTIVE:     Chief Complaint: leg and back pain    History of Present Illness: Carleen Kincaid is a 61 y.o. female with past history of neurogenic claudication s/p L4-5 posterior fusion in 2014 who was referred to me by Merrill Godinez PA-C with Orthopedics. She has an extensive orthopedic history, including bilateral knee replacements with most recently failed left knee replacement. She is scheduled for a left knee arthroplasty revision in early April. She is here for complaints of returned lower back pain as well as leg pain that began about 2 years ago. She initially underwent an L4-5 posterior fusion for neurogenic claudication and spondylolisthesis in 2014 by Dr. Richard Sanchez in [place], [place]. Her symptoms improved post operatively. About 2 years ago she had return of her lower back pain and pain that radiates down the posterior aspect of bilateral legs. Pain in the legs is exacerbated by walking for long periods. It is relieved by leaning forward and sitting. She endorses several episodes of loss of control of her bowels and bladder in the past month. She denies any focal weakness or saddle anesthesia. She has not tried physical therapy or pain management     Of additional note: patient reports a history of prior stroke and would like to get established with a Neurologist at [facility].     Review of patient's allergies indicates:  No Known Allergies    Current Outpatient Medications   Medication Sig Dispense Refill    adalimumab (HUMIRA PEN) 40 mg/0.8 mL PnKt Inject subq q 2 weeks 2 pen 6    calcipotriene-betamethasone (TACLONEX SCALP) external suspension Apply topically once daily. 60 g 1    celecoxib (CELEBREX) 200 MG capsule Take 1 capsule (200 mg total) by mouth 2 (two) times daily. 60 capsule 6    clindamycin (CLEOCIN T) 1 % external solution AAA bid 60 mL 3    CLODAN 0.05 % shampoo Apply to dry scalp 15 min prior to washing/rinsing qod 1 Bottle 3     clonazePAM (KLONOPIN) 0.5 MG tablet TK 1 T PO TID FOR 10 DAYS PRN  0    cyclobenzaprine (FLEXERIL) 5 MG tablet Take 1 tablet (5 mg total) by mouth daily as needed for Muscle spasms. Not sure of dosage 30 tablet 0    diclofenac (VOLTAREN) 75 MG EC tablet TAKE 1 TABLET(75 MG) BY MOUTH TWICE DAILY 60 tablet 0    diphenhydramine-acetaminophen (TYLENOL PM)  mg Tab Take 1 tablet by mouth once daily.      ENSTILAR 0.005-0.064 % Foam AAA qday 60 g 3    ergocalciferol (ERGOCALCIFEROL) 50,000 unit Cap   1    lisinopril (PRINIVIL,ZESTRIL) 5 MG tablet Take 5 mg by mouth once daily.      mupirocin (BACTROBAN) 2 % ointment APPLY EXTERNALLY TO THE AFFECTED AREA TWICE DAILY AS NEEDED 22 g 0    pitavastatin calcium (LIVALO) 4 mg Tab Take by mouth as needed.      rosuvastatin (CRESTOR) 10 MG tablet Take 10 mg by mouth once daily.      sertraline (ZOLOFT) 50 MG tablet TK 1 T PO QD  2    traZODone (DESYREL) 50 MG tablet Take 50 mg by mouth nightly as needed.       Current Facility-Administered Medications   Medication Dose Route Frequency Provider Last Rate Last Admin    triamcinolone acetonide injection 10 mg  10 mg Intradermal 1 time in Clinic/HOD Ania Escobedo MD        triamcinolone acetonide injection 10 mg  10 mg Intradermal 1 time in Clinic/HOD Ania Escobedo MD           Past Medical History:   Diagnosis Date    Anemia     Degenerative disc disease, cervical     General anesthetics causing adverse effect in therapeutic use     NO DEMEROL    Hyperlipidemia     Hypertension     Kidney stones     Psoriasis 1983    Stroke     retinal stroke     Past Surgical History:   Procedure Laterality Date    APPENDECTOMY      BACK SURGERY      lumbar spine    HYSTERECTOMY      partial    JOINT REPLACEMENT Bilateral     Knees    KNEE ARTHROSCOPY Right 8/27/2015    Dr Roberts    lithtotrispy      LUMBAR SPINE SURGERY  10/2014    L4-L5 fusion     Family History     Problem Relation (Age of Onset)    Bipolar  disorder Daughter    Cancer Mother (55), Father (82), Brother, Brother, Brother    Diabetes Mellitus Brother, Brother, Brother    Heart disease Brother, Brother, Brother    Kidney disease Daughter    Melanoma Brother, Brother, Brother    Paget's disease of bone Mother    Psoriasis Father, Maternal Grandmother    Thyroid disease Mother        Social History     Socioeconomic History    Marital status:    Tobacco Use    Smoking status: Never Smoker    Smokeless tobacco: Never Used    Tobacco comment: ; ; 3 children   Substance and Sexual Activity    Alcohol use: Yes     Comment: rarely    Drug use: No       Review of Systems   Constitutional: Negative for activity change and fever.   HENT: Negative for ear pain and trouble swallowing.    Eyes: Negative for photophobia and visual disturbance.   Respiratory: Negative for shortness of breath and wheezing.    Cardiovascular: Negative for chest pain.   Gastrointestinal: Negative for abdominal pain, nausea and vomiting.   Genitourinary: Negative for dysuria and hematuria.   Musculoskeletal: Positive for arthralgias and back pain. Negative for neck pain.   Skin: Negative for wound.   Neurological: Positive for numbness. Negative for dizziness, seizures, weakness and headaches.   Psychiatric/Behavioral: Negative for confusion.       OBJECTIVE:     Vital Signs     Vitals:    03/24/22 1443   BP: (!) 146/91   Pulse: 80         Neurosurgery Physical Exam  General: well developed, well nourished, no distress.   Head: normocephalic, atraumatic  Neurologic: Alert and oriented. Thought content appropriate.  GCS: Motor: 6/Verbal: 5/Eyes: 4 GCS Total: 15  Mental Status: Awake, Alert, Oriented x 4  Language: No aphasia  Speech: No dysarthria  Cranial nerves: face symmetric, tongue midline, CN II-XII grossly intact.   Eyes: pupils equal, round, reactive to light with accommodation, EOMI.   Pulmonary: normal respirations, no signs of respiratory  distress  Abdomen: soft, non-distended, not tender to palpation  Skin: Skin is warm, dry and intact.  Sensory: intact to light touch throughout    Motor Strength:Moves all extremities spontaneously with good tone. No abnormal movements seen.     Strength  Deltoids Triceps Biceps Wrist Extension Wrist Flexion Hand    Upper: R 5/5 5/5 5/5 5/5 5/5 5/5    L 5/5 5/5 5/5 5/5 5/5 5/5     Iliopsoas Quadriceps Knee  Flexion Tibialis  anterior Gastro- cnemius EHL   Lower: R 5/5 5/5 5/5 5/5 5/5 5/5    L 5/5 4/5 4/5 5/5 5/5 5/5   Left knee extension/flexion limited 2/2 knee pain     Clonus: Negative.     Gait stable, fluid.   Tandem Gait: No difficulty  Able to walk on heels & toes      Diagnostic Results:  XR of the lumbar spine with flex/ex shows L4-5 posterior fusion. Hardware appears in place without any breaks. There is a mild retrolisthesis of L3 on L4 with loss of disc height.     ASSESSMENT/PLAN:     Carleen Kincaid is a 61 y.o. female with past history of neurogenic claudication s/p L4-5 posterior fusion in 2014 by Dr. Richard Sanchez in [place], [place] who has worsening back pain with neurogenic claudication. Her symptoms resolved following her previous surgery, however have returned in the past 2 years. She has not tried conservative therapy at this point. I have ordered an updated MRI of the lumbar spine to evaluate for any spinal stenosis contributing to her symptoms. She is scheduled for a left knee arthroplasty revision on April 5, 2022. I will see her back with the MRI after her surgery. She knows to call the clinic with any additional questions or concerns.     In regards to her previous history of stroke, the patient has requested a referral for vascular neurology. I have provided her with this referral today as she would like to establish care within the [facility] system.

## 2022-03-27 ENCOUNTER — ANESTHESIA EVENT (OUTPATIENT)
Dept: SURGERY | Facility: HOSPITAL | Age: 62
DRG: 468 | End: 2022-03-27
Payer: COMMERCIAL

## 2022-03-27 RX ORDER — ASCORBIC ACID 500 MG
500 TABLET ORAL DAILY
COMMUNITY

## 2022-03-27 RX ORDER — VIT C/E/ZN/COPPR/LUTEIN/ZEAXAN 250MG-90MG
1000 CAPSULE ORAL DAILY
COMMUNITY

## 2022-03-27 RX ORDER — MULTIVITAMIN
1 TABLET ORAL DAILY
COMMUNITY

## 2022-03-27 NOTE — ANESTHESIA PREPROCEDURE EVALUATION
03/19/2022  Carleen Kincaid is a 61 y.o., female.    Of note, history of 2 level fusion lumbar spine.  Prior knee surgeries has undergone GETA without issue.  After discussion with patient she prefers a general anesthetic to attempting a neuraxial.      Pre-op Assessment          Review of Systems         Anesthesia Assessment: Preoperative EQUATION    Planned Procedure: Procedure(s) (LRB):  ARTHROPLASTY, KNEE (Left)  Requested Anesthesia Type:General  Surgeon: Sri Roberts MD  Service: Orthopedics  Known or anticipated Date of Surgery:4/5/2022    Surgeon notes: reviewed    Electronic QUestionnaire Assessment completed via nurse interview with patient.        Triage considerations:     The patient has no apparent active cardiac condition (No unstable coronary Syndrome such as severe unstable angina or recent [<1 month] myocardial infarction, decompensated CHF, severe valvular   disease or significant arrhythmia)    Previous anesthesia records:GETA, Nerve block for post-op pain and R-TKR 12/2017 [place]; Inserted by: CRNA; Airway Device: Endotracheal Tube; Mask Ventilation: Easy; Intubated: Postinduction; Blade: Kira #3; Airway Device Size: 7.5; Style: Cuffed; Cuff Inflation: Minimal occlusive pressure; Inflation Amount: 4; Placement Verified By: Auscultation, Capnometry, ETT Condensation; Grade: Grade I; Complicating Factors: Obesity    Last PCP note: outside [facility]   Subspecialty notes: Dermatology, for Psoriasis-Off Humira since 1/5/2022    Other important co-morbidities: HLD, HTN, Obesity and Stroke?, anemia, Psoriasis, s/p R TKR 8/2015, L-TKR 8/2016, revision R-TKR 12/2017 all done with Dr. Morgan      Tests already available:  No recent tests.             Instructions given. (See in Nurse's note)    Optimization:  Anesthesia Preop Clinic Assessment  Indicated    Medical Opinion Indicated        Sub-specialist consult indicated:   TBD       Plan:    Testing:  BMP, EKG, Hematology Profile and PT/INR   Pre-anesthesia  visit       Visit focus: possible regional anesthesia and/or nerve block      Consultation:POC NP Josh 4/1 for medical and anesthesia evaluation     Patient  has previously scheduled Medical Appointment:    Navigation: Tests Scheduled.              Consults scheduled.             Results will be tracked by Preop Clinic.                                                                                                                       03/27/2022  Carleen Kincaid is a 61 y.o., female.      Pre-op Assessment    I have reviewed the Patient Summary Reports.     I have reviewed the Nursing Notes.    I have reviewed the Medications.     Review of Systems  Anesthesia Hx:  Denies Hx of Anesthetic complications  History of prior surgery of interest to airway management or planning: Previous anesthesia: General 2016 TKA with general anesthesia.  Airway issues documented on chart review include mask, easy, GETA, easy direct laryngoscopy    Social:  Non-Smoker    Cardiovascular:   Hypertension, well controlled    Pulmonary:  Pulmonary Normal    Renal/:   Chronic Renal Disease renal calculi    Hepatic/GI:  Hepatic/GI Normal    Musculoskeletal:   Arthritis     Neurological:   CVA, no residual symptoms    Endocrine:  Endocrine Normal    Dermatological:   psoriasis       Physical Exam  General: Well nourished and Cooperative    Airway:  Mallampati: II   Mouth Opening: Normal    Chest/Lungs:  Clear to auscultation, Normal Respiratory Rate    Heart:  Rate: Normal  Rhythm: Regular Rhythm    Abdomen:  Normal        Anesthesia Plan  Type of Anesthesia, risks & benefits discussed:    Anesthesia Type: CSE, Gen ETT  Intra-op Monitoring Plan: Standard ASA Monitors  Post Op Pain Control Plan: multimodal analgesia, peripheral nerve block and IV/PO Opioids PRN  Airway Plan: Direct  Informed Consent: Informed consent  signed with the Patient and all parties understand the risks and agree with anesthesia plan.  All questions answered.   ASA Score: 3    Ready For Surgery From Anesthesia Perspective.     .

## 2022-03-30 ENCOUNTER — LAB VISIT (OUTPATIENT)
Dept: LAB | Facility: HOSPITAL | Age: 62
End: 2022-03-30
Attending: DERMATOLOGY
Payer: COMMERCIAL

## 2022-03-30 ENCOUNTER — OFFICE VISIT (OUTPATIENT)
Dept: DERMATOLOGY | Facility: CLINIC | Age: 62
End: 2022-03-30
Payer: COMMERCIAL

## 2022-03-30 DIAGNOSIS — L40.0 PSORIASIS VULGARIS: Primary | ICD-10-CM

## 2022-03-30 DIAGNOSIS — Z79.899 LONG-TERM USE OF HIGH-RISK MEDICATION: ICD-10-CM

## 2022-03-30 DIAGNOSIS — L40.0 PSORIASIS VULGARIS: ICD-10-CM

## 2022-03-30 LAB
BASOPHILS # BLD AUTO: 0.03 K/UL (ref 0–0.2)
BASOPHILS NFR BLD: 0.5 % (ref 0–1.9)
DIFFERENTIAL METHOD: ABNORMAL
EOSINOPHIL # BLD AUTO: 0.1 K/UL (ref 0–0.5)
EOSINOPHIL NFR BLD: 1.2 % (ref 0–8)
ERYTHROCYTE [DISTWIDTH] IN BLOOD BY AUTOMATED COUNT: 14.2 % (ref 11.5–14.5)
HCT VFR BLD AUTO: 37.3 % (ref 37–48.5)
HGB BLD-MCNC: 12.1 G/DL (ref 12–16)
IMM GRANULOCYTES # BLD AUTO: 0.01 K/UL (ref 0–0.04)
IMM GRANULOCYTES NFR BLD AUTO: 0.2 % (ref 0–0.5)
LYMPHOCYTES # BLD AUTO: 2.9 K/UL (ref 1–4.8)
LYMPHOCYTES NFR BLD: 48.8 % (ref 18–48)
MCH RBC QN AUTO: 28.7 PG (ref 27–31)
MCHC RBC AUTO-ENTMCNC: 32.4 G/DL (ref 32–36)
MCV RBC AUTO: 89 FL (ref 82–98)
MONOCYTES # BLD AUTO: 0.4 K/UL (ref 0.3–1)
MONOCYTES NFR BLD: 5.8 % (ref 4–15)
NEUTROPHILS # BLD AUTO: 2.6 K/UL (ref 1.8–7.7)
NEUTROPHILS NFR BLD: 43.5 % (ref 38–73)
NRBC BLD-RTO: 0 /100 WBC
PLATELET # BLD AUTO: 266 K/UL (ref 150–450)
PMV BLD AUTO: 10.2 FL (ref 9.2–12.9)
RBC # BLD AUTO: 4.21 M/UL (ref 4–5.4)
WBC # BLD AUTO: 6 K/UL (ref 3.9–12.7)

## 2022-03-30 PROCEDURE — 1159F PR MEDICATION LIST DOCUMENTED IN MEDICAL RECORD: ICD-10-PCS | Mod: CPTII,S$GLB,, | Performed by: DERMATOLOGY

## 2022-03-30 PROCEDURE — 36415 COLL VENOUS BLD VENIPUNCTURE: CPT | Mod: PO | Performed by: DERMATOLOGY

## 2022-03-30 PROCEDURE — 1160F PR REVIEW ALL MEDS BY PRESCRIBER/CLIN PHARMACIST DOCUMENTED: ICD-10-PCS | Mod: CPTII,S$GLB,, | Performed by: DERMATOLOGY

## 2022-03-30 PROCEDURE — 85025 COMPLETE CBC W/AUTO DIFF WBC: CPT | Performed by: DERMATOLOGY

## 2022-03-30 PROCEDURE — 4010F ACE/ARB THERAPY RXD/TAKEN: CPT | Mod: CPTII,S$GLB,, | Performed by: DERMATOLOGY

## 2022-03-30 PROCEDURE — 4010F PR ACE/ARB THEARPY RXD/TAKEN: ICD-10-PCS | Mod: CPTII,S$GLB,, | Performed by: DERMATOLOGY

## 2022-03-30 PROCEDURE — 1160F RVW MEDS BY RX/DR IN RCRD: CPT | Mod: CPTII,S$GLB,, | Performed by: DERMATOLOGY

## 2022-03-30 PROCEDURE — 99214 OFFICE O/P EST MOD 30 MIN: CPT | Mod: S$GLB,,, | Performed by: DERMATOLOGY

## 2022-03-30 PROCEDURE — 1159F MED LIST DOCD IN RCRD: CPT | Mod: CPTII,S$GLB,, | Performed by: DERMATOLOGY

## 2022-03-30 PROCEDURE — 99999 PR PBB SHADOW E&M-EST. PATIENT-LVL IV: CPT | Mod: PBBFAC,,, | Performed by: DERMATOLOGY

## 2022-03-30 PROCEDURE — 99999 PR PBB SHADOW E&M-EST. PATIENT-LVL IV: ICD-10-PCS | Mod: PBBFAC,,, | Performed by: DERMATOLOGY

## 2022-03-30 PROCEDURE — 99214 PR OFFICE/OUTPT VISIT, EST, LEVL IV, 30-39 MIN: ICD-10-PCS | Mod: S$GLB,,, | Performed by: DERMATOLOGY

## 2022-03-30 RX ORDER — SECUKINUMAB 150 MG/ML
INJECTION SUBCUTANEOUS
Qty: 2 EACH | Refills: 6 | Status: ON HOLD | OUTPATIENT
Start: 2022-03-30 | End: 2022-04-05 | Stop reason: HOSPADM

## 2022-03-30 RX ORDER — SECUKINUMAB 150 MG/ML
INJECTION SUBCUTANEOUS
Qty: 10 EACH | Refills: 0 | OUTPATIENT
Start: 2022-03-30 | End: 2022-03-31 | Stop reason: SDUPTHER

## 2022-03-30 RX ORDER — CLOBETASOL PROPIONATE 0.46 MG/ML
SOLUTION TOPICAL
Qty: 60 ML | Refills: 3 | Status: SHIPPED | OUTPATIENT
Start: 2022-03-30 | End: 2022-12-22

## 2022-03-30 RX ORDER — CALCIPOTRIENE 0.05 MG/ML
SOLUTION TOPICAL
Qty: 60 ML | Refills: 6 | Status: SHIPPED | OUTPATIENT
Start: 2022-03-30

## 2022-03-30 RX ORDER — CLOBETASOL PROPIONATE 0.05 G/100ML
SHAMPOO TOPICAL
Qty: 1 EACH | Refills: 3 | Status: ON HOLD | OUTPATIENT
Start: 2022-03-30 | End: 2022-04-05 | Stop reason: HOSPADM

## 2022-03-30 NOTE — PATIENT INSTRUCTIONS
Discussed risks of infection, especially  yeast and fungal infections, avoid live vaccines, need for TB screenings, hypersensitivity reactions. Pt with no hx of Chron's disease.   Will start Cosentyx at dose of 300mg given by 2- 150 mg prefilled syringes by SQ injection at week 0, 1, 2, 3, 4 then q month.    Will check CBC 1 month after starting medication and then q3-6 months.

## 2022-03-30 NOTE — PROGRESS NOTES
Subjective:       Patient ID:  Carleen Kincaid is a 61 y.o. female who presents for   Chief Complaint   Patient presents with    Psoriasis     F/u-worse       Pt here for psoriasis follow up.  Pt is flaring on scalp and back. She has suffered with this condition for over 10 years.   Pt has been on Humira since 2017.  Has had recent breaks in therapy bc of stressful situations and medication access. Does have significant joint pains as well and having a knee replacement.           Psoriasis - Follow-up  Symptom course: worsening  Affected locations: scalp and back  Signs / symptoms: itching  Severity: mild        Review of Systems   Constitutional: Negative for fever and chills.   Gastrointestinal: Negative for abdominal pain, diarrhea and constipation.   Musculoskeletal: Positive for joint swelling and arthralgias.   Skin: Positive for itching and rash.   Psychiatric/Behavioral: Positive for anxiety and high stress.        Objective:    Physical Exam   Constitutional: She appears well-developed and well-nourished. No distress.   Neurological: She is alert and oriented to person, place, and time. She is not disoriented.   Psychiatric: She has a normal mood and affect.   Skin:   Areas Examined (abnormalities noted in diagram):   Scalp / Hair Palpated and Inspected  Head / Face Inspection Performed  Chest / Axilla Inspection Performed  Abdomen Inspection Performed  Back Inspection Performed  RUE Inspected  LUE Inspection Performed  RLE Inspected  LLE Inspection Performed  Nails and Digits Inspection Performed                       Diagram Legend     Erythematous scaling macule/papule c/w actinic keratosis       Vascular papule c/w angioma      Pigmented verrucoid papule/plaque c/w seborrheic keratosis      Yellow umbilicated papule c/w sebaceous hyperplasia      Irregularly shaped tan macule c/w lentigo     1-2 mm smooth white papules consistent with Milia      Movable subcutaneous cyst with punctum c/w epidermal  inclusion cyst      Subcutaneous movable cyst c/w pilar cyst      Firm pink to brown papule c/w dermatofibroma      Pedunculated fleshy papule(s) c/w skin tag(s)      Evenly pigmented macule c/w junctional nevus     Mildly variegated pigmented, slightly irregular-bordered macule c/w mildly atypical nevus      Flesh colored to evenly pigmented papule c/w intradermal nevus       Pink pearly papule/plaque c/w basal cell carcinoma      Erythematous hyperkeratotic cursted plaque c/w SCC      Surgical scar with no sign of skin cancer recurrence      Open and closed comedones      Inflammatory papules and pustules      Verrucoid papule consistent consistent with wart     Erythematous eczematous patches and plaques     Dystrophic onycholytic nail with subungual debris c/w onychomycosis     Umbilicated papule    Erythematous-base heme-crusted tan verrucoid plaque consistent with inflamed seborrheic keratosis     Erythematous Silvery Scaling Plaque c/w Psoriasis     See annotation      Assessment / Plan:        Psoriasis vulgaris  Long-term use of high-risk medication  -     calcipotriene 0.005 % sclp soln; aaa qd- bid prn flare  Dispense: 60 mL; Refill: 6  -     clobetasoL (TEMOVATE) 0.05 % external solution; Use on scalp one - two times daily as needed for scaling or itching  Dispense: 60 mL; Refill: 3  -     clobetasoL (CLODAN) 0.05 % shampoo; Apply to dry scalp 15 min prior to washing/rinsing qod  Dispense: 1 each; Refill: 3  -     Quantiferon Gold TB; Future; Expected date: 03/30/2022  -     CBC Auto Differential; Future; Expected date: 03/30/2022  -     COSENTYX PEN, 2 PENS, 150 mg/mL PnIj; Inject 300mg (2 pens) SQ q 4 weeks  Dispense: 2 each; Refill: 6  -     COSENTYX PEN, 2 PENS, 150 mg/mL PnIj; Inject 300mg SQ qweek x 5 weeks then inject 300mg SQ q 4 weeks  Dispense: 10 each; Refill: 0    Discussed risks of infection, especially  yeast and fungal infections, avoid live vaccines, need for TB screenings,  hypersensitivity reactions. Pt with no hx of Chron's disease.   Will start Cosentyx at dose of 300mg given by 2- 150 mg prefilled syringes by SQ injection at week 0, 1, 2, 3, 4 then q month.    Will check CBC 1 month after starting medication and then q3-6 months.    Lab Results   Component Value Date    WBC 5.73 02/08/2021    HGB 13.6 02/08/2021    HCT 43.6 02/08/2021    MCV 90 02/08/2021     02/08/2021     Last TB 3/2021         Follow up for 3 months after starting medication.

## 2022-04-01 ENCOUNTER — OFFICE VISIT (OUTPATIENT)
Dept: INTERNAL MEDICINE | Facility: CLINIC | Age: 62
End: 2022-04-01
Payer: COMMERCIAL

## 2022-04-01 ENCOUNTER — HOSPITAL ENCOUNTER (OUTPATIENT)
Dept: RADIOLOGY | Facility: HOSPITAL | Age: 62
Discharge: HOME | End: 2022-04-01
Attending: NURSE PRACTITIONER
Payer: COMMERCIAL

## 2022-04-01 ENCOUNTER — PATIENT MESSAGE (OUTPATIENT)
Dept: ADMINISTRATIVE | Facility: OTHER | Age: 62
End: 2022-04-01
Payer: COMMERCIAL

## 2022-04-01 ENCOUNTER — OFFICE VISIT (OUTPATIENT)
Dept: SPORTS MEDICINE | Facility: CLINIC | Age: 62
End: 2022-04-01
Payer: COMMERCIAL

## 2022-04-01 ENCOUNTER — TELEPHONE (OUTPATIENT)
Dept: SPORTS MEDICINE | Facility: CLINIC | Age: 62
End: 2022-04-01

## 2022-04-01 ENCOUNTER — HOSPITAL ENCOUNTER (OUTPATIENT)
Dept: CARDIOLOGY | Facility: CLINIC | Age: 62
Discharge: HOME | End: 2022-04-01
Payer: COMMERCIAL

## 2022-04-01 VITALS
HEART RATE: 92 BPM | DIASTOLIC BLOOD PRESSURE: 84 MMHG | HEIGHT: 62 IN | BODY MASS INDEX: 35.39 KG/M2 | WEIGHT: 192.31 LBS | SYSTOLIC BLOOD PRESSURE: 117 MMHG

## 2022-04-01 VITALS
SYSTOLIC BLOOD PRESSURE: 136 MMHG | HEIGHT: 61 IN | WEIGHT: 190 LBS | HEART RATE: 88 BPM | BODY MASS INDEX: 35.87 KG/M2 | DIASTOLIC BLOOD PRESSURE: 86 MMHG | OXYGEN SATURATION: 98 % | TEMPERATURE: 99 F

## 2022-04-01 DIAGNOSIS — M79.605 PAIN OF LEFT LOWER EXTREMITY: ICD-10-CM

## 2022-04-01 DIAGNOSIS — R09.82 POST-NASAL DRIP: ICD-10-CM

## 2022-04-01 DIAGNOSIS — L40.50 PSORIATIC ARTHRITIS: ICD-10-CM

## 2022-04-01 DIAGNOSIS — I10 ESSENTIAL HYPERTENSION: ICD-10-CM

## 2022-04-01 DIAGNOSIS — R53.83 FATIGUE, UNSPECIFIED TYPE: ICD-10-CM

## 2022-04-01 DIAGNOSIS — T84.093D FAILED TOTAL KNEE, LEFT, SUBSEQUENT ENCOUNTER: ICD-10-CM

## 2022-04-01 DIAGNOSIS — F43.21 GRIEF AT LOSS OF CHILD: ICD-10-CM

## 2022-04-01 DIAGNOSIS — T84.84XA PAINFUL ORTHOPAEDIC HARDWARE: Primary | ICD-10-CM

## 2022-04-01 DIAGNOSIS — Z63.4 GRIEF AT LOSS OF CHILD: ICD-10-CM

## 2022-04-01 DIAGNOSIS — T84.093A FAILED TOTAL LEFT KNEE REPLACEMENT, INITIAL ENCOUNTER: ICD-10-CM

## 2022-04-01 DIAGNOSIS — I10 HYPERTENSION, UNSPECIFIED TYPE: ICD-10-CM

## 2022-04-01 PROBLEM — M25.569 KNEE PAIN: Status: RESOLVED | Noted: 2017-02-13 | Resolved: 2022-04-01

## 2022-04-01 PROBLEM — G43.909 MIGRAINE HEADACHE: Status: ACTIVE | Noted: 2022-04-01

## 2022-04-01 PROBLEM — M23.91 INTERNAL DERANGEMENT OF RIGHT KNEE: Status: RESOLVED | Noted: 2017-02-13 | Resolved: 2022-04-01

## 2022-04-01 PROCEDURE — 99999 PR PBB SHADOW E&M-EST. PATIENT-LVL IV: CPT | Mod: PBBFAC,,, | Performed by: NURSE PRACTITIONER

## 2022-04-01 PROCEDURE — 93005 ELECTROCARDIOGRAM TRACING: CPT | Mod: S$GLB,,, | Performed by: ANESTHESIOLOGY

## 2022-04-01 PROCEDURE — 1159F PR MEDICATION LIST DOCUMENTED IN MEDICAL RECORD: ICD-10-PCS | Mod: CPTII,S$GLB,, | Performed by: NURSE PRACTITIONER

## 2022-04-01 PROCEDURE — 3075F PR MOST RECENT SYSTOLIC BLOOD PRESS GE 130-139MM HG: ICD-10-PCS | Mod: CPTII,S$GLB,, | Performed by: NURSE PRACTITIONER

## 2022-04-01 PROCEDURE — 3008F PR BODY MASS INDEX (BMI) DOCUMENTED: ICD-10-PCS | Mod: CPTII,S$GLB,, | Performed by: NURSE PRACTITIONER

## 2022-04-01 PROCEDURE — 3079F DIAST BP 80-89 MM HG: CPT | Mod: CPTII,S$GLB,, | Performed by: NURSE PRACTITIONER

## 2022-04-01 PROCEDURE — 3008F BODY MASS INDEX DOCD: CPT | Mod: CPTII,S$GLB,, | Performed by: NURSE PRACTITIONER

## 2022-04-01 PROCEDURE — 1159F MED LIST DOCD IN RCRD: CPT | Mod: CPTII,S$GLB,, | Performed by: NURSE PRACTITIONER

## 2022-04-01 PROCEDURE — 99214 OFFICE O/P EST MOD 30 MIN: CPT | Mod: S$GLB,,, | Performed by: PHYSICIAN ASSISTANT

## 2022-04-01 PROCEDURE — 93010 EKG 12-LEAD: ICD-10-PCS | Mod: S$GLB,,, | Performed by: INTERNAL MEDICINE

## 2022-04-01 PROCEDURE — 3008F BODY MASS INDEX DOCD: CPT | Mod: CPTII,S$GLB,, | Performed by: PHYSICIAN ASSISTANT

## 2022-04-01 PROCEDURE — 3079F PR MOST RECENT DIASTOLIC BLOOD PRESSURE 80-89 MM HG: ICD-10-PCS | Mod: CPTII,S$GLB,, | Performed by: PHYSICIAN ASSISTANT

## 2022-04-01 PROCEDURE — 72052 XR CERVICAL SPINE 5 VIEW WITH FLEX AND EXT: ICD-10-PCS | Mod: 26,,, | Performed by: RADIOLOGY

## 2022-04-01 PROCEDURE — 3008F PR BODY MASS INDEX (BMI) DOCUMENTED: ICD-10-PCS | Mod: CPTII,S$GLB,, | Performed by: PHYSICIAN ASSISTANT

## 2022-04-01 PROCEDURE — 4010F PR ACE/ARB THEARPY RXD/TAKEN: ICD-10-PCS | Mod: CPTII,S$GLB,, | Performed by: PHYSICIAN ASSISTANT

## 2022-04-01 PROCEDURE — 1160F PR REVIEW ALL MEDS BY PRESCRIBER/CLIN PHARMACIST DOCUMENTED: ICD-10-PCS | Mod: CPTII,S$GLB,, | Performed by: PHYSICIAN ASSISTANT

## 2022-04-01 PROCEDURE — 93010 ELECTROCARDIOGRAM REPORT: CPT | Mod: S$GLB,,, | Performed by: INTERNAL MEDICINE

## 2022-04-01 PROCEDURE — 1159F PR MEDICATION LIST DOCUMENTED IN MEDICAL RECORD: ICD-10-PCS | Mod: CPTII,S$GLB,, | Performed by: PHYSICIAN ASSISTANT

## 2022-04-01 PROCEDURE — 93005 EKG 12-LEAD: ICD-10-PCS | Mod: S$GLB,,, | Performed by: ANESTHESIOLOGY

## 2022-04-01 PROCEDURE — 3074F PR MOST RECENT SYSTOLIC BLOOD PRESSURE < 130 MM HG: ICD-10-PCS | Mod: CPTII,S$GLB,, | Performed by: PHYSICIAN ASSISTANT

## 2022-04-01 PROCEDURE — 72052 X-RAY EXAM NECK SPINE 6/>VWS: CPT | Mod: TC

## 2022-04-01 PROCEDURE — 72052 X-RAY EXAM NECK SPINE 6/>VWS: CPT | Mod: 26,,, | Performed by: RADIOLOGY

## 2022-04-01 PROCEDURE — 1160F RVW MEDS BY RX/DR IN RCRD: CPT | Mod: CPTII,S$GLB,, | Performed by: PHYSICIAN ASSISTANT

## 2022-04-01 PROCEDURE — 4010F ACE/ARB THERAPY RXD/TAKEN: CPT | Mod: CPTII,S$GLB,, | Performed by: NURSE PRACTITIONER

## 2022-04-01 PROCEDURE — 4010F ACE/ARB THERAPY RXD/TAKEN: CPT | Mod: CPTII,S$GLB,, | Performed by: PHYSICIAN ASSISTANT

## 2022-04-01 PROCEDURE — 1159F MED LIST DOCD IN RCRD: CPT | Mod: CPTII,S$GLB,, | Performed by: PHYSICIAN ASSISTANT

## 2022-04-01 PROCEDURE — 3074F SYST BP LT 130 MM HG: CPT | Mod: CPTII,S$GLB,, | Performed by: PHYSICIAN ASSISTANT

## 2022-04-01 PROCEDURE — 99214 OFFICE O/P EST MOD 30 MIN: CPT | Mod: S$GLB,,, | Performed by: NURSE PRACTITIONER

## 2022-04-01 PROCEDURE — 1160F RVW MEDS BY RX/DR IN RCRD: CPT | Mod: CPTII,S$GLB,, | Performed by: NURSE PRACTITIONER

## 2022-04-01 PROCEDURE — 3079F DIAST BP 80-89 MM HG: CPT | Mod: CPTII,S$GLB,, | Performed by: PHYSICIAN ASSISTANT

## 2022-04-01 PROCEDURE — 99999 PR PBB SHADOW E&M-EST. PATIENT-LVL V: ICD-10-PCS | Mod: PBBFAC,,, | Performed by: PHYSICIAN ASSISTANT

## 2022-04-01 PROCEDURE — 1160F PR REVIEW ALL MEDS BY PRESCRIBER/CLIN PHARMACIST DOCUMENTED: ICD-10-PCS | Mod: CPTII,S$GLB,, | Performed by: NURSE PRACTITIONER

## 2022-04-01 PROCEDURE — 3075F SYST BP GE 130 - 139MM HG: CPT | Mod: CPTII,S$GLB,, | Performed by: NURSE PRACTITIONER

## 2022-04-01 PROCEDURE — 99214 PR OFFICE/OUTPT VISIT, EST, LEVL IV, 30-39 MIN: ICD-10-PCS | Mod: S$GLB,,, | Performed by: NURSE PRACTITIONER

## 2022-04-01 PROCEDURE — 99999 PR PBB SHADOW E&M-EST. PATIENT-LVL V: CPT | Mod: PBBFAC,,, | Performed by: PHYSICIAN ASSISTANT

## 2022-04-01 PROCEDURE — 99214 PR OFFICE/OUTPT VISIT, EST, LEVL IV, 30-39 MIN: ICD-10-PCS | Mod: S$GLB,,, | Performed by: PHYSICIAN ASSISTANT

## 2022-04-01 PROCEDURE — 4010F PR ACE/ARB THEARPY RXD/TAKEN: ICD-10-PCS | Mod: CPTII,S$GLB,, | Performed by: NURSE PRACTITIONER

## 2022-04-01 PROCEDURE — 99999 PR PBB SHADOW E&M-EST. PATIENT-LVL IV: ICD-10-PCS | Mod: PBBFAC,,, | Performed by: NURSE PRACTITIONER

## 2022-04-01 PROCEDURE — 3079F PR MOST RECENT DIASTOLIC BLOOD PRESSURE 80-89 MM HG: ICD-10-PCS | Mod: CPTII,S$GLB,, | Performed by: NURSE PRACTITIONER

## 2022-04-01 RX ORDER — ASPIRIN 325 MG
325 TABLET, DELAYED RELEASE (ENTERIC COATED) ORAL DAILY
Qty: 42 TABLET | Refills: 0 | Status: SHIPPED | OUTPATIENT
Start: 2022-04-01 | End: 2022-07-11

## 2022-04-01 RX ORDER — AMOXICILLIN 250 MG
1 CAPSULE ORAL 2 TIMES DAILY
Status: CANCELLED | OUTPATIENT
Start: 2022-04-01

## 2022-04-01 RX ORDER — FAMOTIDINE 20 MG/1
20 TABLET, FILM COATED ORAL 2 TIMES DAILY
Status: CANCELLED | OUTPATIENT
Start: 2022-04-01

## 2022-04-01 RX ORDER — MUPIROCIN 20 MG/G
1 OINTMENT TOPICAL
Status: CANCELLED | OUTPATIENT
Start: 2022-04-01

## 2022-04-01 RX ORDER — SODIUM CHLORIDE 9 MG/ML
INJECTION, SOLUTION INTRAVENOUS
Status: CANCELLED | OUTPATIENT
Start: 2022-04-01

## 2022-04-01 RX ORDER — ONDANSETRON 2 MG/ML
4 INJECTION INTRAMUSCULAR; INTRAVENOUS EVERY 8 HOURS PRN
Status: CANCELLED | OUTPATIENT
Start: 2022-04-01

## 2022-04-01 RX ORDER — OXYCODONE HYDROCHLORIDE 5 MG/1
10 TABLET ORAL
Status: CANCELLED | OUTPATIENT
Start: 2022-04-01

## 2022-04-01 RX ORDER — LIDOCAINE HYDROCHLORIDE 10 MG/ML
1 INJECTION, SOLUTION EPIDURAL; INFILTRATION; INTRACAUDAL; PERINEURAL
Status: CANCELLED | OUTPATIENT
Start: 2022-04-01

## 2022-04-01 RX ORDER — ACETAMINOPHEN 500 MG
1000 TABLET ORAL
Status: CANCELLED | OUTPATIENT
Start: 2022-04-01

## 2022-04-01 RX ORDER — BISACODYL 10 MG
10 SUPPOSITORY, RECTAL RECTAL EVERY 12 HOURS PRN
Status: CANCELLED | OUTPATIENT
Start: 2022-04-01

## 2022-04-01 RX ORDER — PREGABALIN 75 MG/1
75 CAPSULE ORAL NIGHTLY
Status: CANCELLED | OUTPATIENT
Start: 2022-04-01

## 2022-04-01 RX ORDER — ACETAMINOPHEN 500 MG
1000 TABLET ORAL EVERY 6 HOURS
Status: CANCELLED | OUTPATIENT
Start: 2022-04-01

## 2022-04-01 RX ORDER — NALOXONE HCL 0.4 MG/ML
0.02 VIAL (ML) INJECTION
Status: CANCELLED | OUTPATIENT
Start: 2022-04-01

## 2022-04-01 RX ORDER — CELECOXIB 200 MG/1
400 CAPSULE ORAL
Status: CANCELLED | OUTPATIENT
Start: 2022-04-01

## 2022-04-01 RX ORDER — PREGABALIN 75 MG/1
75 CAPSULE ORAL
Status: CANCELLED | OUTPATIENT
Start: 2022-04-01

## 2022-04-01 RX ORDER — METHOCARBAMOL 750 MG/1
750 TABLET, FILM COATED ORAL 3 TIMES DAILY
Status: CANCELLED | OUTPATIENT
Start: 2022-04-01

## 2022-04-01 RX ORDER — SODIUM CHLORIDE 9 MG/ML
INJECTION, SOLUTION INTRAVENOUS CONTINUOUS
Status: CANCELLED | OUTPATIENT
Start: 2022-04-01 | End: 2022-04-02

## 2022-04-01 RX ORDER — METHOCARBAMOL 500 MG/1
500 TABLET, FILM COATED ORAL 3 TIMES DAILY
Qty: 30 TABLET | Refills: 0 | Status: SHIPPED | OUTPATIENT
Start: 2022-04-01 | End: 2022-04-15

## 2022-04-01 RX ORDER — CELECOXIB 200 MG/1
200 CAPSULE ORAL 2 TIMES DAILY
Qty: 60 CAPSULE | Refills: 2 | Status: ON HOLD | OUTPATIENT
Start: 2022-04-01 | End: 2022-07-13

## 2022-04-01 RX ORDER — ASPIRIN 81 MG/1
81 TABLET ORAL 2 TIMES DAILY
Status: CANCELLED | OUTPATIENT
Start: 2022-04-01

## 2022-04-01 RX ORDER — HYDROCODONE BITARTRATE AND ACETAMINOPHEN 10; 325 MG/1; MG/1
1 TABLET ORAL EVERY 6 HOURS PRN
Qty: 28 TABLET | Refills: 0 | Status: SHIPPED | OUTPATIENT
Start: 2022-04-01 | End: 2022-04-22 | Stop reason: SDUPTHER

## 2022-04-01 RX ORDER — PROCHLORPERAZINE EDISYLATE 5 MG/ML
5 INJECTION INTRAMUSCULAR; INTRAVENOUS EVERY 6 HOURS PRN
Status: CANCELLED | OUTPATIENT
Start: 2022-04-01

## 2022-04-01 RX ORDER — POLYETHYLENE GLYCOL 3350 17 G/17G
17 POWDER, FOR SOLUTION ORAL DAILY
Status: CANCELLED | OUTPATIENT
Start: 2022-04-01

## 2022-04-01 RX ORDER — SODIUM CHLORIDE 0.9 % (FLUSH) 0.9 %
10 SYRINGE (ML) INJECTION
Status: CANCELLED | OUTPATIENT
Start: 2022-04-01

## 2022-04-01 RX ORDER — OXYCODONE HYDROCHLORIDE 5 MG/1
5 TABLET ORAL
Status: CANCELLED | OUTPATIENT
Start: 2022-04-01

## 2022-04-01 RX ORDER — PROMETHAZINE HYDROCHLORIDE 25 MG/1
25 TABLET ORAL EVERY 6 HOURS PRN
Qty: 30 TABLET | Refills: 0 | Status: SHIPPED | OUTPATIENT
Start: 2022-04-01 | End: 2022-05-01

## 2022-04-01 RX ORDER — ROPIVACAINE/EPI/CLONIDINE/KET 2.46-0.005
SYRINGE (ML) INJECTION ONCE
Status: CANCELLED | OUTPATIENT
Start: 2022-04-01 | End: 2022-04-01

## 2022-04-01 SDOH — SOCIAL DETERMINANTS OF HEALTH (SDOH): DISSAPEARANCE AND DEATH OF FAMILY MEMBER: Z63.4

## 2022-04-01 NOTE — H&P
Carleen Kincaid  is here for a completion of her perioperative paperwork. she  Is scheduled to undergo Left total knee revision arthroplasty- Depuy to be performed by Dr. oRberts on 4/5/22. She  does need clearance for this procedure.    GOING HOME SAME DAY    Pending pre-op center clearance. Appt today at 1pm with Clarke Moreno.    Risks, indications and benefits of the surgical procedure were discussed with the patient. All questions with regard to surgery, rehab, expected return to functional activities, activities of daily living and recreational endeavors were answered to her satisfaction.    Discussed COVID-19 with the patient, they are aware of our current policies and procedures, were given the option of delaying surgery, and they elect to proceed.    Patient was informed and understands the risks of surgery are greater for patients with a current condition or history of heart disease, obesity, clotting disorders, recurrent infections, steroid use, current or past smoking, and factors such as sedentary lifestyle and noncompliance with medications, therapy or follow-up. The degree of the increased risk is hard to estimate with any degree of precision.    Once no other questions were asked, a brief history and physical exam was then performed.    PAST MEDICAL HISTORY:   Past Medical History:   Diagnosis Date    Anxiety     Degenerative disc disease, cervical     Depression     General anesthetics causing adverse effect in therapeutic use     NO DEMEROL    Hyperlipidemia     Hypertension     Kidney stones     Psoriasis 1983    Stroke     retinal stroke     PAST SURGICAL HISTORY:   Past Surgical History:   Procedure Laterality Date    APPENDECTOMY      BACK SURGERY      lumbar spine    HYSTERECTOMY      partial    JOINT REPLACEMENT Bilateral     Knees    KNEE ARTHROSCOPY Right 8/27/2015    Dr Roberts    lithtotrispy      LUMBAR SPINE SURGERY  10/2014    L4-L5 fusion     FAMILY HISTORY:   Family  History   Problem Relation Age of Onset    Thyroid disease Mother     Cancer Mother 55        lymphoma    Paget's disease of bone Mother     Psoriasis Father     Cancer Father 82        Bladder cancer    Diabetes Mellitus Brother     Heart disease Brother     Cancer Brother         prostate    Melanoma Brother     Bipolar disorder Daughter     Kidney disease Daughter         reflux    Psoriasis Maternal Grandmother     Diabetes Mellitus Brother     Heart disease Brother     Cancer Brother         prostate    Melanoma Brother     Diabetes Mellitus Brother     Heart disease Brother     Cancer Brother         prostate    Melanoma Brother     Lupus Neg Hx     Inflammatory bowel disease Neg Hx     Rheum arthritis Neg Hx      SOCIAL HISTORY:   Social History     Socioeconomic History    Marital status:    Tobacco Use    Smoking status: Never Smoker    Smokeless tobacco: Never Used    Tobacco comment: ; ; 3 children   Substance and Sexual Activity    Alcohol use: Yes     Comment: rarely    Drug use: No       MEDICATIONS:   Current Outpatient Medications:     acetaminophen (TYLENOL ORAL), Take by mouth., Disp: , Rfl:     adalimumab (HUMIRA PEN) 40 mg/0.8 mL PnKt, Inject subq q 2 weeks (Patient not taking: No sig reported), Disp: 2 pen, Rfl: 6    ascorbic acid, vitamin C, (VITAMIN C) 500 MG tablet, Take 500 mg by mouth once daily., Disp: , Rfl:     calcipotriene 0.005 % sclp soln, aaa qd- bid prn flare, Disp: 60 mL, Rfl: 6    calcipotriene-betamethasone (TACLONEX SCALP) external suspension, Apply topically once daily., Disp: 60 g, Rfl: 1    cholecalciferol, vitamin D3, (VITAMIN D3) 25 mcg (1,000 unit) capsule, Take 1,000 Units by mouth once daily., Disp: , Rfl:     clindamycin (CLEOCIN T) 1 % external solution, AAA bid, Disp: 60 mL, Rfl: 3    clobetasoL (CLODAN) 0.05 % shampoo, Apply to dry scalp 15 min prior to washing/rinsing qod, Disp: 1 each, Rfl:  3    clobetasoL (TEMOVATE) 0.05 % external solution, Use on scalp one - two times daily as needed for scaling or itching, Disp: 60 mL, Rfl: 3    COSENTYX PEN, 2 PENS, 150 mg/mL PnIj, Inject 300mg (2 pens) SQ q 4 weeks, Disp: 2 each, Rfl: 6    cyanocobalamin, vitamin B-12, (VITAMIN B-12 ORAL), Take by mouth., Disp: , Rfl:     cyclobenzaprine (FLEXERIL) 5 MG tablet, Take 1 tablet (5 mg total) by mouth daily as needed for Muscle spasms. Not sure of dosage, Disp: 30 tablet, Rfl: 0    diclofenac (VOLTAREN) 75 MG EC tablet, TAKE 1 TABLET(75 MG) BY MOUTH TWICE DAILY, Disp: 60 tablet, Rfl: 0    ENSTILAR 0.005-0.064 % Foam, AAA qday (Patient not taking: No sig reported), Disp: 60 g, Rfl: 3    ergocalciferol (ERGOCALCIFEROL) 50,000 unit Cap, , Disp: , Rfl: 1    lisinopriL (PRINIVIL,ZESTRIL) 20 MG tablet, Take 20 mg by mouth once daily., Disp: , Rfl:     multivitamin (THERAGRAN) per tablet, Take 1 tablet by mouth once daily., Disp: , Rfl:     mupirocin (BACTROBAN) 2 % ointment, APPLY EXTERNALLY TO THE AFFECTED AREA TWICE DAILY AS NEEDED, Disp: 22 g, Rfl: 0    ondansetron (ZOFRAN-ODT) 4 MG TbDL, Take 4 mg by mouth 2 (two) times daily as needed., Disp: , Rfl:     rosuvastatin (CRESTOR) 10 MG tablet, Take 10 mg by mouth once daily., Disp: , Rfl:     sertraline (ZOLOFT) 100 MG tablet, Take 100 mg by mouth once daily., Disp: , Rfl:     sumatriptan (IMITREX) 100 MG tablet, Take 100 mg by mouth., Disp: , Rfl:     traZODone (DESYREL) 50 MG tablet, Take 50 mg by mouth nightly as needed., Disp: , Rfl:     Current Facility-Administered Medications:     triamcinolone acetonide injection 10 mg, 10 mg, Intradermal, 1 time in Clinic/HOD, Ania Escobedo MD    triamcinolone acetonide injection 10 mg, 10 mg, Intradermal, 1 time in Clinic/Ania BOWEN MD  ALLERGIES: Review of patient's allergies indicates:  No Known Allergies    Review of Systems   Constitution: Negative. Negative for chills, fever and night sweats.    HENT: Negative for congestion and headaches.    Eyes: Negative for blurred vision, left vision loss and right vision loss.   Cardiovascular: Negative for chest pain and syncope.   Respiratory: Negative for cough and shortness of breath.    Endocrine: Negative for polydipsia, polyphagia and polyuria.   Hematologic/Lymphatic: Negative for bleeding problem. Does not bruise/bleed easily.   Skin: Negative for dry skin, itching and rash.   Musculoskeletal: Negative for falls and muscle weakness.   Gastrointestinal: Negative for abdominal pain and bowel incontinence.   Genitourinary: Negative for bladder incontinence and nocturia.   Neurological: Negative for disturbances in coordination, loss of balance and seizures.   Psychiatric/Behavioral: Negative for depression. The patient does not have insomnia.    Allergic/Immunologic: Negative for hives and persistent infections.     PHYSICAL EXAM:  GEN: A&Ox3, WD WN NAD  HEENT: WNL  CHEST: CTAB, no W/R/R  HEART: RRR, no M/R/G   ABD: Soft, NT ND, BS x4 QUADS  MS: Refer to previous note for detailed MS exam  NEURO: CN II-XII intact       The surgical consent was then reviewed with the patient, who agreed with all the contents of the consent form and it was signed. she was instructed to wait for a phone call from the anesthesia department prior to surgery to discuss past medical history, medications, and clearance. Also, informed she may be required to get additional testing per the anesthesia department prior to having surgery.     she was also instructed to present to Joint Boot Camp Class prior to surgery or her surgery may be cancelled or postponed.     Due to the serious nature of total joint infection and the high prevalence of community acquired MRSA, vancomycin will be used perioperatively.     PHYSICAL THERAPY:   She was also instructed regarding physical therapy and will begin POD # 1-3 at [facility]. Rx faxed.    POST OP CARE:instructions were reviewed  including care of the wound and dressing after surgery and when she can shower.     PAIN MANAGEMENT: Carleen Kincaid was also given a pain management regime, which includes the TENS unit given to her by myself along with the education required for its use. She was also instructed regarding the Polar ice unit that will be in place after surgery and her postoperative pain medications.     PAIN MEDICATION:  Norco 10/325mg 1 po q 4-6 hours prn pain  Phenergan 25 mg one p.o. q.4-6 hours p.r.n. nausea and vomiting.  Celebrex 200 mg BID  Robaxin 500mg TID PRN  Aspirin 325mg daily x 6 weeks for DVT prophylaxis starting on the evening after surgery.    Post op meds to be delivered bedside prior to discharge. Deliver to family if patient is in surgery at 5pm.     Patient denies history of seizures.     Patient will also use bilateral TEDs on lower extremities, SCDs during surgery, and early ambulation post-op. If the patient was previously taking 81mg baby aspirin, they were told to not take it will using the above stated aspirin and to restart the 81mg aspirin after completion of the aspirin dose.       Patient was also told to buy over the counter Prilosec medication and take it once daily for GI protection as long as they are taking NSAIDs or Aspirin.    DVT prophylaxis was discussed with the patient today including risk factors for developing DVTs and history of DVTs. The patient was asked if any specific recommendations were given from the doctor/s that did pre-operative surgical clearance. I have prescribed a mechanical DVT prophylaxis device, , for the above listed patient, to reduce the risk for clot formation and complications that can arise from a DVT. In my professional medical opinion, I consider this medically necessary and have prescribed the device for the purpose of postoperative treatment and rehabilitation. This can treat both the acute and chronic aspects of the inflammatory reaction that accompanies  trauma and surgery. Additionally, I am prescribing mechanical DVT prophylaxis because the patient will have restricted mobility post-operatively and is at high risk for DVT. Failure to approve this device will compromise the outcome of this patient's healing process.     Patient was asked if they were taking or using OCP pills or devices. If they answered yes, then they were instructed to stop using OCPs at this pre-operative appointment until 2 months post-op to help prevent DVT development. They understand that there are other forms of birth control that do not involve hormones. They expressed understanding that ignoring/not following this instruction could result in a DVT which could turn into a deadly pulmonary embolism.      The patient was told that narcotic pain medications may make them drowsy and instructions were given to not sign legal documents, drive or operate heavy machinery, cars, or equipment while under the influence of narcotic medications.     As there were no other questions to be asked, she was given my business card along with Sri Roberts MD business card if she has any questions or concerns prior to surger

## 2022-04-01 NOTE — TELEPHONE ENCOUNTER
The pt called because [facility] thinking she would need to complete a C-spine XR and a chest XR for her surgical clearance, but there were no orders for a chest XR included.     She stated she wasn't sure if the PCP that would be clearing her for the surgery, Clarke Moreno NP, would need both. Based on the office visit note, it does not look like the chest XR is needed for surgical clearance and I let her know that she is all set.

## 2022-04-01 NOTE — ASSESSMENT & PLAN NOTE
Patient reports PND that she attributes to seasonal alllergies  Recommended use of Flonase nasal spray and or allergy pill of choice- Zyrtec or Claritin or Allegra    Patient reports she uses sinus saline rinses which as improved her allergy symptoms

## 2022-04-01 NOTE — PROGRESS NOTES
[facility]  Progress Note    Patient Name: Carleen Kincaid  MRN: [id number]  Date of Evaluation- 04/04/2022  PCP- Francis Rodriguez MD    Future cases for Carleen Kincaid [[id number]]     Case ID Status Date Time Angel Procedure Provider Location    [id number] [facility] 4/5/2022  7:00  REVISION, ARTHROPLASTY, KNEE Sri Roberts MD [[id number]] [facility] OR          HPI:  This is a 61 y.o. female  who presents today for a preoperative evaluation in preparation for a Orthopedic  procedure.  Scheduled for  4/5/22  Surgery is indicated for left total knee replacement revision.   Patient is new to me.  Details of current problem: The duration of problem is has been about 6-9 months   Reports symptoms of  pain, fallen twice, instability, swelling, & limping  Aggravating factors include: pain worsened at night, walking, &standing    Relieving factors are ice  & rest      Treated with  Diclofenac  Reports pain: 5/10  The history has been obtained by speaking with the patient and reviewing the electronic medical record and/or outside health information. Significant health conditions for the perioperative period are discussed below in assessment and plan.     Patient reports current health status to be Fair.  Having back pain in lumbar area at this  time- DDD Denies any new symptoms before surgery.         Subjective/ Objective:     Chief Complaint: Preoperative evaulation, perioperative medical management, and complication reduction plan.     Functional Capacity: Able to climb a flight of stairs without CP SOB or Syncope.  Able to meet 4 METs.        Anesthesia issues: None    Difficulty mouth opening: none    Steroid use in the last 12 months:  none    Dental Issues: none    Family anesthesia difficulty: None     Family Hx of Thrombosis None    Past Medical History:   Diagnosis Date    Anxiety     Degenerative disc disease, cervical     Depression     General anesthetics causing adverse effect in therapeutic use     NO  "DEMEROL    Hyperlipidemia     Hypertension     Internal derangement of right knee 2/13/2017    Joint pain 11/12/2014    Kidney stones     Knee pain 2/13/2017    Psoriasis 1983    Right knee pain 11/16/2015    Stroke     retinal stroke     Past Surgical History:   Procedure Laterality Date    APPENDECTOMY      HYSTERECTOMY      partial    JOINT REPLACEMENT Bilateral     Knees    KNEE ARTHROSCOPY Right 8/27/2015    Dr Roberts    lithtotrispy      LUMBAR SPINE SURGERY  10/2014    L4-L5 fusion       Review of Systems   Constitutional: Positive for fatigue. Negative for appetite change, chills, fever and unexpected weight change.   HENT: Positive for congestion and postnasal drip. Negative for trouble swallowing and voice change.    Eyes: Negative for photophobia and visual disturbance.        No acute visual changes   Respiratory: Negative for apnea, cough, chest tightness, shortness of breath and wheezing.         STOP bang  Score 3    High risk DARREN   Cardiovascular: Negative for chest pain, palpitations and leg swelling.   Gastrointestinal: Negative for abdominal distention, abdominal pain, blood in stool, constipation, diarrhea, nausea and vomiting.        NO FLD, hepatitis, cirrhosis  No BRB or black tarry stool     Endocrine: Negative for cold intolerance, heat intolerance, polydipsia, polyphagia and polyuria.   Genitourinary: Negative for difficulty urinating, dysuria, flank pain, frequency, hematuria and urgency.   Musculoskeletal: Positive for arthralgias, back pain, gait problem and joint swelling. Negative for myalgias, neck pain and neck stiffness.   Neurological: Negative for dizziness, tremors, seizures, syncope, weakness, light-headedness, numbness and headaches.   Psychiatric/Behavioral: Negative for suicidal ideas. The patient is nervous/anxious.       VITALS  Visit Vitals  /86 (BP Location: Left arm, Patient Position: Sitting)   Pulse 88   Temp 98.8 °F (37.1 °C) (Oral)   Ht 5' 1" " (1.549 m)   Wt 86.2 kg (190 lb)   SpO2 98%   BMI 35.90 kg/m²          Physical Exam  Constitutional:       General: She is not in acute distress.     Appearance: Normal appearance. She is well-developed. She is not diaphoretic.   HENT:      Head: Normocephalic.      Right Ear: Hearing normal.      Left Ear: Hearing normal.      Nose: Nose normal.      Mouth/Throat:      Mouth: Mucous membranes are moist.      Pharynx: Oropharynx is clear. No oropharyngeal exudate.   Eyes:      General: Lids are normal.         Right eye: No discharge.         Left eye: No discharge.      Conjunctiva/sclera: Conjunctivae normal.      Pupils: Pupils are equal, round, and reactive to light.   Neck:      Thyroid: No thyromegaly.      Vascular: No JVD.      Trachea: Trachea and phonation normal. No tracheal deviation.   Cardiovascular:      Rate and Rhythm: Normal rate and regular rhythm.      Pulses:           Carotid pulses are 2+ on the right side and 2+ on the left side.       Radial pulses are 2+ on the right side and 2+ on the left side.        Posterior tibial pulses are 2+ on the right side and 2+ on the left side.      Heart sounds: Normal heart sounds. No murmur heard.    No friction rub. No gallop.   Pulmonary:      Effort: Pulmonary effort is normal. No respiratory distress.      Breath sounds: Normal breath sounds. No stridor. No wheezing or rales.   Chest:      Chest wall: No tenderness.   Abdominal:      General: Abdomen is flat. Bowel sounds are normal. There is no distension.      Palpations: Abdomen is soft.      Tenderness: There is no abdominal tenderness. There is no guarding.   Musculoskeletal:         General: No tenderness or deformity. Normal range of motion.      Cervical back: Normal range of motion and neck supple.      Right lower leg: No edema.      Left lower leg: No edema.   Lymphadenopathy:      Head:      Right side of head: No submental, submandibular, tonsillar, preauricular, posterior auricular or  occipital adenopathy.      Left side of head: No submental, submandibular, tonsillar, preauricular, posterior auricular or occipital adenopathy.      Cervical: No cervical adenopathy.      Right cervical: No superficial cervical adenopathy.     Left cervical: No superficial cervical adenopathy.   Skin:     General: Skin is warm and dry.      Capillary Refill: Capillary refill takes 2 to 3 seconds.      Coloration: Skin is not pale.      Findings: No erythema or rash.   Neurological:      Mental Status: She is alert and oriented to person, place, and time.      GCS: GCS eye subscore is 4. GCS verbal subscore is 5. GCS motor subscore is 6.      Motor: No abnormal muscle tone.      Coordination: Coordination normal.   Psychiatric:         Attention and Perception: Attention and perception normal.         Mood and Affect: Mood and affect normal.         Speech: Speech normal.         Behavior: Behavior normal. Behavior is cooperative.         Thought Content: Thought content normal.         Cognition and Memory: Cognition and memory normal.         Judgment: Judgment normal.          Significant Labs:  Lab Results   Component Value Date    WBC 6.94 04/01/2022    HGB 12.6 04/01/2022    HCT 38.1 04/01/2022     04/01/2022    ALT 28 02/08/2021    AST 25 02/08/2021     04/01/2022    K 4.1 04/01/2022     04/01/2022    CREATININE 0.7 04/01/2022    BUN 18 04/01/2022    CO2 25 04/01/2022    INR 1.0 04/01/2022       Diagnostic Studies: No relevant studies.    EKG:   Results for orders placed or performed during the hospital encounter of 04/01/22   EKG 12-lead    Collection Time: 04/01/22  2:42 PM    Narrative    Test Reason : I10,M79.605,    Vent. Rate : 071 BPM     Atrial Rate : 071 BPM     P-R Int : 156 ms          QRS Dur : 072 ms      QT Int : 398 ms       P-R-T Axes : 030 021 031 degrees     QTc Int : 432 ms    Normal sinus rhythm  Normal ECG  No previous ECGs available  Confirmed by Morgan AHUMADA, Domi  ([id number]) on 4/3/2022 11:51:22 AM    Referred By: ANDREW BERRY           Confirmed By:Domi Mora MD       2D ECHO:  TTE:  No results found for this or any previous visit.    ARYA:  No results found for this or any previous visit.     Imaging     Active Cardiac Conditions: None      Revised Cardiac Risk Index   High -Risk Surgery  Intraperitoneal; Intrathoracic; suprainguinal vascular Yes- + 1 No- 0   History of Ischemic Heart Disease   (Hx of MI/positive exercise test/current chest pain due to ischemia/use of nitrate therapy/EKG with pathological Q waves) Yes- + 1 No- 0   History of CHF  (Pulmonary edema/bilateral rales or S3 gallop/PND/CXR showing pulmonary vascular redistribution) Yes- + 1 No- 0   History of CVA   (Prior stroke or TIA) Yes- + 1 No- 0   Pre-operative treatment with insulin Yes- + 1 No- 0   Pre-operative creatinine > 2mg/dl Yes- + 1 No- 0   Total:      Risk Status:  Estimated risk of cardiac complications after non-cardiac surgery using the Revised Cardiac Risk Index for Preoperative risk is 6.0 %      ARISCAT (Canet) risk index: Low: 1.6% risk of post-op pulmonary complications.    American Society of Anesthesiologists Physical Status classification (ASA): 3           No further cardiac workup needed prior to surgery.        Preoperative cardiac risk assessment-  The patient does not have any active cardiac conditions . Revised cardiac risk index predictors- ---.Functional capacity is more than 4 Mets. She will be undergoing a Orthopedic procedure that carries a Moderate Risk risk     The estimated risk of the rate of adverse cardiac outcomes  6%    No further cardiac work up is indicated prior to proceeding with the surgery     Orders Placed This Encounter    X-Ray Cervical Spine 5 View With Flex And Ext       American Society of Anesthesiologists Physical status classification ( ASA ) class: 3     Postoperative pulmonary complication risk assessment: 1.6     ARISCAT ( Canet) risk index- risk  class -  Low, if duration of surgery is under 3 hours, intermediate, if duration of surgery is over 3 hours          Assessment/Plan:     Failed total left knee replacement  See HPI    Fatigue  Currently has fatigue r/t pain in left knee and lumbar spine    Essential hypertension  Current BP  136/86 today.  Taking: Lisinopril    Encouraged keeping a healthy weight and BMI  Lifestyle changes to reduce systolic BP: exercise 30 minutes per day,  5 days per week or 150 minutes weekly; sodium reduction and avoidance of high salt foods such as processed meats, frozen meals and  fast foods.     BP acceptable for surgery. I recommend monitoring BP during perioperative period as well as uncontrolled pain which can elevated blood pressure.           BMI 35.0-35.9,adult  BMI 35.9  Plans for healthier eating habits and walking after surgery    Psoriatic arthritis  Patient has psoriasis and psoriatic arthiritis    On Humira - previously prescribed-  Current plan is to start Cosentyx after surgery pending insurance approval for new drug    High risk d/t LT use of a Biologic        Post-nasal drip  Patient reports PND that she attributes to seasonal alllergies  Recommended use of Flonase nasal spray and or allergy pill of choice- Zyrtec or Claritin or Allegra    Patient reports she uses sinus saline rinses which as improved her allergy symptoms    Migraine headache  Imitrex Prn Migraine    Grief at loss of child  Lost child this year      Preventive perioperative care    Thromboembolic prophylaxis:  Her risk factors for thrombosis include morbid obesity, surgical procedure, age and reduced mobility.I suggest  thromboembolic prophylaxis ( mechanical/pharmacological, weighing the risk benefits of pharmacological agent use considering casey procedural bleeding )  during the perioperative period.I suggested being active in the post operative period.      Postoperative pulmonary complication prophylaxis-Risk factors for post operative  pulmonary complications include ASA class >2- I suggest incentive spirometry use, early ambulation and pain control so as to avoid diaphragmatic splinting  Brush teeth twice per day, oral rinses, sleep with the head of the bed up 30 degrees     Renal complication prophylaxis-Risk factors for renal complications include age and hypertension . I suggest keeping her well hydrated and avoidance/ minimizing the use of  NSAID's,EDMOND 2 Inhibitors ,IV contrast if possible in the perioperative period.I suggested drinking 2 litre's of water a day      Surgical site Infection Prophylaxis-I  suggest appropriate antibiotic for Prophylaxis against Surgical site infections Shower with Hibiclens in the night before surgery and the morning of surgery     This visit was focused on Preoperative evaluation, Perioperative Medical management, complication reduction plans. I suggest that the patient follows up with primary care or relevant sub specialists for ongoing health care.    I appreciate the opportunity to be involved in this patients care. Please feel free to contact me if there were any questions about this consultation.    Patient is optimized      Clarke Moreno NP  Perioperative Medicine  [facility]   Pager [phone]

## 2022-04-01 NOTE — OUTPATIENT SUBJECTIVE & OBJECTIVE
Outpatient Subjective & Objective      Chief Complaint: Preoperative evaulation, perioperative medical management, and complication reduction plan.     Functional Capacity: Able to climb a flight of stairs without CP SOB or Syncope.  Able to meet 4 METs.        Anesthesia issues: None    Difficulty mouth opening: none    Steroid use in the last 12 months:  none    Dental Issues: none    Family anesthesia difficulty: None     Family Hx of Thrombosis None    Past Medical History:   Diagnosis Date    Anxiety     Degenerative disc disease, cervical     Depression     General anesthetics causing adverse effect in therapeutic use     NO DEMEROL    Hyperlipidemia     Hypertension     Internal derangement of right knee 2/13/2017    Joint pain 11/12/2014    Kidney stones     Knee pain 2/13/2017    Psoriasis 1983    Right knee pain 11/16/2015    Stroke     retinal stroke     Past Surgical History:   Procedure Laterality Date    APPENDECTOMY      HYSTERECTOMY      partial    JOINT REPLACEMENT Bilateral     Knees    KNEE ARTHROSCOPY Right 8/27/2015    Dr Roberts    lithtotrispy      LUMBAR SPINE SURGERY  10/2014    L4-L5 fusion       Review of Systems   Constitutional: Positive for fatigue. Negative for appetite change, chills, fever and unexpected weight change.   HENT: Positive for congestion and postnasal drip. Negative for trouble swallowing and voice change.    Eyes: Negative for photophobia and visual disturbance.        No acute visual changes   Respiratory: Negative for apnea, cough, chest tightness, shortness of breath and wheezing.         STOP bang  Score 3    High risk DARREN   Cardiovascular: Negative for chest pain, palpitations and leg swelling.   Gastrointestinal: Negative for abdominal distention, abdominal pain, blood in stool, constipation, diarrhea, nausea and vomiting.        NO FLD, hepatitis, cirrhosis  No BRB or black tarry stool     Endocrine: Negative for cold intolerance, heat  "intolerance, polydipsia, polyphagia and polyuria.   Genitourinary: Negative for difficulty urinating, dysuria, flank pain, frequency, hematuria and urgency.   Musculoskeletal: Positive for arthralgias, back pain, gait problem and joint swelling. Negative for myalgias, neck pain and neck stiffness.   Neurological: Negative for dizziness, tremors, seizures, syncope, weakness, light-headedness, numbness and headaches.   Psychiatric/Behavioral: Negative for suicidal ideas. The patient is nervous/anxious.       VITALS  Visit Vitals  /86 (BP Location: Left arm, Patient Position: Sitting)   Pulse 88   Temp 98.8 °F (37.1 °C) (Oral)   Ht 5' 1" (1.549 m)   Wt 86.2 kg (190 lb)   SpO2 98%   BMI 35.90 kg/m²          Physical Exam  Constitutional:       General: She is not in acute distress.     Appearance: Normal appearance. She is well-developed. She is not diaphoretic.   HENT:      Head: Normocephalic.      Right Ear: Hearing normal.      Left Ear: Hearing normal.      Nose: Nose normal.      Mouth/Throat:      Mouth: Mucous membranes are moist.      Pharynx: Oropharynx is clear. No oropharyngeal exudate.   Eyes:      General: Lids are normal.         Right eye: No discharge.         Left eye: No discharge.      Conjunctiva/sclera: Conjunctivae normal.      Pupils: Pupils are equal, round, and reactive to light.   Neck:      Thyroid: No thyromegaly.      Vascular: No JVD.      Trachea: Trachea and phonation normal. No tracheal deviation.   Cardiovascular:      Rate and Rhythm: Normal rate and regular rhythm.      Pulses:           Carotid pulses are 2+ on the right side and 2+ on the left side.       Radial pulses are 2+ on the right side and 2+ on the left side.        Posterior tibial pulses are 2+ on the right side and 2+ on the left side.      Heart sounds: Normal heart sounds. No murmur heard.    No friction rub. No gallop.   Pulmonary:      Effort: Pulmonary effort is normal. No respiratory distress.      Breath " sounds: Normal breath sounds. No stridor. No wheezing or rales.   Chest:      Chest wall: No tenderness.   Abdominal:      General: Abdomen is flat. Bowel sounds are normal. There is no distension.      Palpations: Abdomen is soft.      Tenderness: There is no abdominal tenderness. There is no guarding.   Musculoskeletal:         General: No tenderness or deformity. Normal range of motion.      Cervical back: Normal range of motion and neck supple.      Right lower leg: No edema.      Left lower leg: No edema.   Lymphadenopathy:      Head:      Right side of head: No submental, submandibular, tonsillar, preauricular, posterior auricular or occipital adenopathy.      Left side of head: No submental, submandibular, tonsillar, preauricular, posterior auricular or occipital adenopathy.      Cervical: No cervical adenopathy.      Right cervical: No superficial cervical adenopathy.     Left cervical: No superficial cervical adenopathy.   Skin:     General: Skin is warm and dry.      Capillary Refill: Capillary refill takes 2 to 3 seconds.      Coloration: Skin is not pale.      Findings: No erythema or rash.   Neurological:      Mental Status: She is alert and oriented to person, place, and time.      GCS: GCS eye subscore is 4. GCS verbal subscore is 5. GCS motor subscore is 6.      Motor: No abnormal muscle tone.      Coordination: Coordination normal.   Psychiatric:         Attention and Perception: Attention and perception normal.         Mood and Affect: Mood and affect normal.         Speech: Speech normal.         Behavior: Behavior normal. Behavior is cooperative.         Thought Content: Thought content normal.         Cognition and Memory: Cognition and memory normal.         Judgment: Judgment normal.          Significant Labs:  Lab Results   Component Value Date    WBC 6.94 04/01/2022    HGB 12.6 04/01/2022    HCT 38.1 04/01/2022     04/01/2022    ALT 28 02/08/2021    AST 25 02/08/2021      04/01/2022    K 4.1 04/01/2022     04/01/2022    CREATININE 0.7 04/01/2022    BUN 18 04/01/2022    CO2 25 04/01/2022    INR 1.0 04/01/2022       Diagnostic Studies: No relevant studies.    EKG:   Results for orders placed or performed during the hospital encounter of 04/01/22   EKG 12-lead    Collection Time: 04/01/22  2:42 PM    Narrative    Test Reason : I10,M79.605,    Vent. Rate : 071 BPM     Atrial Rate : 071 BPM     P-R Int : 156 ms          QRS Dur : 072 ms      QT Int : 398 ms       P-R-T Axes : 030 021 031 degrees     QTc Int : 432 ms    Normal sinus rhythm  Normal ECG  No previous ECGs available  Confirmed by Domi Mora MD ([id number]) on 4/3/2022 11:51:22 AM    Referred By: ANDREW BERRY           Confirmed By:Domi Mora MD       2D ECHO:  TTE:  No results found for this or any previous visit.    ARYA:  No results found for this or any previous visit.     Imaging     Active Cardiac Conditions: None      Revised Cardiac Risk Index   High -Risk Surgery  Intraperitoneal; Intrathoracic; suprainguinal vascular Yes- + 1 No- 0   History of Ischemic Heart Disease   (Hx of MI/positive exercise test/current chest pain due to ischemia/use of nitrate therapy/EKG with pathological Q waves) Yes- + 1 No- 0   History of CHF  (Pulmonary edema/bilateral rales or S3 gallop/PND/CXR showing pulmonary vascular redistribution) Yes- + 1 No- 0   History of CVA   (Prior stroke or TIA) Yes- + 1 No- 0   Pre-operative treatment with insulin Yes- + 1 No- 0   Pre-operative creatinine > 2mg/dl Yes- + 1 No- 0   Total:      Risk Status:  Estimated risk of cardiac complications after non-cardiac surgery using the Revised Cardiac Risk Index for Preoperative risk is 6.0 %      ARISCAT (Canet) risk index: Low: 1.6% risk of post-op pulmonary complications.    American Society of Anesthesiologists Physical Status classification (ASA): 3           No further cardiac workup needed prior to surgery.    Outpatient Subjective & Objective

## 2022-04-01 NOTE — ASSESSMENT & PLAN NOTE
Patient has psoriasis and psoriatic arthiritis    On Humira - previously prescribed-  Current plan is to start Cosentyx after surgery pending insurance approval for new drug    High risk d/t LT use of a Biologic

## 2022-04-01 NOTE — PATIENT INSTRUCTIONS
Preventative treatment for allergies  Flonase nasal spray  Antihistamines as needed - Can use zyrtec, or Claritin or Allergra        Preventive perioperative care    Thromboembolic prophylaxis:  Her risk factors for thrombosis include morbid obesity, surgical procedure, age and reduced mobility.I suggest  thromboembolic prophylaxis ( mechanical/pharmacological, weighing the risk benefits of pharmacological agent use considering casey procedural bleeding )  during the perioperative period.I suggested being active in the post operative period.      Postoperative pulmonary complication prophylaxis-Risk factors for post operative pulmonary complications include ASA class >2- I suggest incentive spirometry use, early ambulation and pain control so as to avoid diaphragmatic splinting  Brush teeth twice per day, oral rinses, sleep with the head of the bed up 30 degrees     Renal complication prophylaxis-Risk factors for renal complications include age and hypertension . I suggest keeping her well hydrated and avoidance/ minimizing the use of  NSAID's,EDMOND 2 Inhibitors ,IV contrast if possible in the perioperative period.I suggested drinking 2 litre's of water a day      Surgical site Infection Prophylaxis-I  suggest appropriate antibiotic for Prophylaxis against Surgical site infections Shower with Hibiclens in the night before surgery and the morning of surgery     This visit was focused on Preoperative evaluation, Perioperative Medical management, complication reduction plans. I suggest that the patient follows up with primary care or relevant sub specialists for ongoing health care.    I appreciate the opportunity to be involved in this patients care. Please feel free to contact me if there were any questions about this consultation.

## 2022-04-01 NOTE — HPI
This is a 61 y.o. female  who presents today for a preoperative evaluation in preparation for a Orthopedic  procedure.  Scheduled for  4/5/22  Surgery is indicated for left total knee replacement revision.   Patient is new to me.  Details of current problem: The duration of problem is has been about 6-9 months   Reports symptoms of  pain, fallen twice, instability, swelling, & limping  Aggravating factors include: pain worsened at night, walking, &standing    Relieving factors are ice  & rest      Treated with  Diclofenac  Reports pain: 5/10  The history has been obtained by speaking with the patient and reviewing the electronic medical record and/or outside health information. Significant health conditions for the perioperative period are discussed below in assessment and plan.     Patient reports current health status to be Fair.  Having back pain in lumbar area at this  time- DDD Denies any new symptoms before surgery.

## 2022-04-01 NOTE — ASSESSMENT & PLAN NOTE
Current BP  136/86 today.  Taking: Lisinopril    Encouraged keeping a healthy weight and BMI  Lifestyle changes to reduce systolic BP: exercise 30 minutes per day,  5 days per week or 150 minutes weekly; sodium reduction and avoidance of high salt foods such as processed meats, frozen meals and  fast foods.     BP acceptable for surgery. I recommend monitoring BP during perioperative period as well as uncontrolled pain which can elevated blood pressure.          I personally performed the service described in the documentation recorded by the scribe in my presence, and it accurately and completely records my words and actions.

## 2022-04-01 NOTE — TELEPHONE ENCOUNTER
----- Message from Bipin Dawkins sent at 4/1/2022  3:41 PM CDT -----  Contact: @[phone]   Patient requesting a return call about a pre op Chest x-ray  pls call (she's at imaging now )

## 2022-04-01 NOTE — PROGRESS NOTES
Carleen Kincaid is a 61 y.o. year old here today for pre surgery optimization visit  in preparation for a Left total knee revision arthroplasty- Depuy to be performed by Dr. Roberts on 4/5/22.  she was last seen and treated in the clinic on 3/9/2022. she will be medically optimized by the pre op center. There has been no significant change in medical status since last visit. No fever, chills, malaise, or unexplained weight change.      Appt today at 1pm with Clarke Moreno at pre-op center.    Allergies, Medications, past medical and surgical history reviewed.    Focused examination performed.    Patient declined to see surgeon today. All questions answered. Patient encouraged to call with questions. Contact information given.     Pre, casey, and post operative procedures and expectations discussed. Goals of successful surgery reviewed and include manageable pain levels, surgical site free of infection, medication management, and ambulation with PT/OT assistance. Healthy weight management discussed with patient and caregiver who were receptive to eduction of healthy diet and activity. No other necessary lifestyle changes identified. Educated patient about signs and symptoms of infection, medication management, anticoagulation therapy, risk of tobacco and alcohol use, and self-care to promote healing. Surgical guide given for future reference. Hibiclens given to patient with instructions. All questions were answered.     Carleen Kincaid verbalized an understanding to the education and goals. Patient has displayed readiness to engage in care and is ready to proceed with surgery.  Patient reports her  is able and ready to provide assistance at home after discharge.    Surgical and blood consents signed.    Carleen Kincaid will contact us if there are any questions, concerns, or changes in medical status prior to surgery.       Joint class: Completed in past    COVID-19 test date: 4/3/22    Patient has discussed  discharge planning with surgeon. Patient will be discharged to home following surgery.   patient will be scheduled with non-[facility] PT. [facility]    60 minutes of time was spent on patient education, review of records, templating, H&P, , appointment scheduling and optimizing patient for surgery.

## 2022-04-01 NOTE — H&P (VIEW-ONLY)
Carleen Kincaid  is here for a completion of her perioperative paperwork. she  Is scheduled to undergo Left total knee revision arthroplasty- Depuy to be performed by Dr. Roberts on 4/5/22. She  does need clearance for this procedure.    GOING HOME SAME DAY    Pending pre-op center clearance. Appt today at 1pm with Clarke Moreno.    Risks, indications and benefits of the surgical procedure were discussed with the patient. All questions with regard to surgery, rehab, expected return to functional activities, activities of daily living and recreational endeavors were answered to her satisfaction.    Discussed COVID-19 with the patient, they are aware of our current policies and procedures, were given the option of delaying surgery, and they elect to proceed.    Patient was informed and understands the risks of surgery are greater for patients with a current condition or history of heart disease, obesity, clotting disorders, recurrent infections, steroid use, current or past smoking, and factors such as sedentary lifestyle and noncompliance with medications, therapy or follow-up. The degree of the increased risk is hard to estimate with any degree of precision.    Once no other questions were asked, a brief history and physical exam was then performed.    PAST MEDICAL HISTORY:   Past Medical History:   Diagnosis Date    Anxiety     Degenerative disc disease, cervical     Depression     General anesthetics causing adverse effect in therapeutic use     NO DEMEROL    Hyperlipidemia     Hypertension     Kidney stones     Psoriasis 1983    Stroke     retinal stroke     PAST SURGICAL HISTORY:   Past Surgical History:   Procedure Laterality Date    APPENDECTOMY      BACK SURGERY      lumbar spine    HYSTERECTOMY      partial    JOINT REPLACEMENT Bilateral     Knees    KNEE ARTHROSCOPY Right 8/27/2015    Dr Roberts    lithtotrispy      LUMBAR SPINE SURGERY  10/2014    L4-L5 fusion     FAMILY HISTORY:   Family  History   Problem Relation Age of Onset    Thyroid disease Mother     Cancer Mother 55        lymphoma    Paget's disease of bone Mother     Psoriasis Father     Cancer Father 82        Bladder cancer    Diabetes Mellitus Brother     Heart disease Brother     Cancer Brother         prostate    Melanoma Brother     Bipolar disorder Daughter     Kidney disease Daughter         reflux    Psoriasis Maternal Grandmother     Diabetes Mellitus Brother     Heart disease Brother     Cancer Brother         prostate    Melanoma Brother     Diabetes Mellitus Brother     Heart disease Brother     Cancer Brother         prostate    Melanoma Brother     Lupus Neg Hx     Inflammatory bowel disease Neg Hx     Rheum arthritis Neg Hx      SOCIAL HISTORY:   Social History     Socioeconomic History    Marital status:    Tobacco Use    Smoking status: Never Smoker    Smokeless tobacco: Never Used    Tobacco comment: ; ; 3 children   Substance and Sexual Activity    Alcohol use: Yes     Comment: rarely    Drug use: No       MEDICATIONS:   Current Outpatient Medications:     acetaminophen (TYLENOL ORAL), Take by mouth., Disp: , Rfl:     adalimumab (HUMIRA PEN) 40 mg/0.8 mL PnKt, Inject subq q 2 weeks (Patient not taking: No sig reported), Disp: 2 pen, Rfl: 6    ascorbic acid, vitamin C, (VITAMIN C) 500 MG tablet, Take 500 mg by mouth once daily., Disp: , Rfl:     calcipotriene 0.005 % sclp soln, aaa qd- bid prn flare, Disp: 60 mL, Rfl: 6    calcipotriene-betamethasone (TACLONEX SCALP) external suspension, Apply topically once daily., Disp: 60 g, Rfl: 1    cholecalciferol, vitamin D3, (VITAMIN D3) 25 mcg (1,000 unit) capsule, Take 1,000 Units by mouth once daily., Disp: , Rfl:     clindamycin (CLEOCIN T) 1 % external solution, AAA bid, Disp: 60 mL, Rfl: 3    clobetasoL (CLODAN) 0.05 % shampoo, Apply to dry scalp 15 min prior to washing/rinsing qod, Disp: 1 each, Rfl:  3    clobetasoL (TEMOVATE) 0.05 % external solution, Use on scalp one - two times daily as needed for scaling or itching, Disp: 60 mL, Rfl: 3    COSENTYX PEN, 2 PENS, 150 mg/mL PnIj, Inject 300mg (2 pens) SQ q 4 weeks, Disp: 2 each, Rfl: 6    cyanocobalamin, vitamin B-12, (VITAMIN B-12 ORAL), Take by mouth., Disp: , Rfl:     cyclobenzaprine (FLEXERIL) 5 MG tablet, Take 1 tablet (5 mg total) by mouth daily as needed for Muscle spasms. Not sure of dosage, Disp: 30 tablet, Rfl: 0    diclofenac (VOLTAREN) 75 MG EC tablet, TAKE 1 TABLET(75 MG) BY MOUTH TWICE DAILY, Disp: 60 tablet, Rfl: 0    ENSTILAR 0.005-0.064 % Foam, AAA qday (Patient not taking: No sig reported), Disp: 60 g, Rfl: 3    ergocalciferol (ERGOCALCIFEROL) 50,000 unit Cap, , Disp: , Rfl: 1    lisinopriL (PRINIVIL,ZESTRIL) 20 MG tablet, Take 20 mg by mouth once daily., Disp: , Rfl:     multivitamin (THERAGRAN) per tablet, Take 1 tablet by mouth once daily., Disp: , Rfl:     mupirocin (BACTROBAN) 2 % ointment, APPLY EXTERNALLY TO THE AFFECTED AREA TWICE DAILY AS NEEDED, Disp: 22 g, Rfl: 0    ondansetron (ZOFRAN-ODT) 4 MG TbDL, Take 4 mg by mouth 2 (two) times daily as needed., Disp: , Rfl:     rosuvastatin (CRESTOR) 10 MG tablet, Take 10 mg by mouth once daily., Disp: , Rfl:     sertraline (ZOLOFT) 100 MG tablet, Take 100 mg by mouth once daily., Disp: , Rfl:     sumatriptan (IMITREX) 100 MG tablet, Take 100 mg by mouth., Disp: , Rfl:     traZODone (DESYREL) 50 MG tablet, Take 50 mg by mouth nightly as needed., Disp: , Rfl:     Current Facility-Administered Medications:     triamcinolone acetonide injection 10 mg, 10 mg, Intradermal, 1 time in Clinic/HOD, Ania Escobedo MD    triamcinolone acetonide injection 10 mg, 10 mg, Intradermal, 1 time in Clinic/Ania BOWEN MD  ALLERGIES: Review of patient's allergies indicates:  No Known Allergies    Review of Systems   Constitution: Negative. Negative for chills, fever and night sweats.    HENT: Negative for congestion and headaches.    Eyes: Negative for blurred vision, left vision loss and right vision loss.   Cardiovascular: Negative for chest pain and syncope.   Respiratory: Negative for cough and shortness of breath.    Endocrine: Negative for polydipsia, polyphagia and polyuria.   Hematologic/Lymphatic: Negative for bleeding problem. Does not bruise/bleed easily.   Skin: Negative for dry skin, itching and rash.   Musculoskeletal: Negative for falls and muscle weakness.   Gastrointestinal: Negative for abdominal pain and bowel incontinence.   Genitourinary: Negative for bladder incontinence and nocturia.   Neurological: Negative for disturbances in coordination, loss of balance and seizures.   Psychiatric/Behavioral: Negative for depression. The patient does not have insomnia.    Allergic/Immunologic: Negative for hives and persistent infections.     PHYSICAL EXAM:  GEN: A&Ox3, WD WN NAD  HEENT: WNL  CHEST: CTAB, no W/R/R  HEART: RRR, no M/R/G   ABD: Soft, NT ND, BS x4 QUADS  MS: Refer to previous note for detailed MS exam  NEURO: CN II-XII intact       The surgical consent was then reviewed with the patient, who agreed with all the contents of the consent form and it was signed. she was instructed to wait for a phone call from the anesthesia department prior to surgery to discuss past medical history, medications, and clearance. Also, informed she may be required to get additional testing per the anesthesia department prior to having surgery.     she was also instructed to present to Joint Boot Camp Class prior to surgery or her surgery may be cancelled or postponed.     Due to the serious nature of total joint infection and the high prevalence of community acquired MRSA, vancomycin will be used perioperatively.     PHYSICAL THERAPY:   She was also instructed regarding physical therapy and will begin POD # 1-3 at [facility]. Rx faxed.    POST OP CARE:instructions were reviewed  including care of the wound and dressing after surgery and when she can shower.     PAIN MANAGEMENT: Carleen Kincaid was also given a pain management regime, which includes the TENS unit given to her by myself along with the education required for its use. She was also instructed regarding the Polar ice unit that will be in place after surgery and her postoperative pain medications.     PAIN MEDICATION:  Norco 10/325mg 1 po q 4-6 hours prn pain  Phenergan 25 mg one p.o. q.4-6 hours p.r.n. nausea and vomiting.  Celebrex 200 mg BID  Robaxin 500mg TID PRN  Aspirin 325mg daily x 6 weeks for DVT prophylaxis starting on the evening after surgery.    Post op meds to be delivered bedside prior to discharge. Deliver to family if patient is in surgery at 5pm.     Patient denies history of seizures.     Patient will also use bilateral TEDs on lower extremities, SCDs during surgery, and early ambulation post-op. If the patient was previously taking 81mg baby aspirin, they were told to not take it will using the above stated aspirin and to restart the 81mg aspirin after completion of the aspirin dose.       Patient was also told to buy over the counter Prilosec medication and take it once daily for GI protection as long as they are taking NSAIDs or Aspirin.    DVT prophylaxis was discussed with the patient today including risk factors for developing DVTs and history of DVTs. The patient was asked if any specific recommendations were given from the doctor/s that did pre-operative surgical clearance. I have prescribed a mechanical DVT prophylaxis device, , for the above listed patient, to reduce the risk for clot formation and complications that can arise from a DVT. In my professional medical opinion, I consider this medically necessary and have prescribed the device for the purpose of postoperative treatment and rehabilitation. This can treat both the acute and chronic aspects of the inflammatory reaction that accompanies  trauma and surgery. Additionally, I am prescribing mechanical DVT prophylaxis because the patient will have restricted mobility post-operatively and is at high risk for DVT. Failure to approve this device will compromise the outcome of this patient's healing process.     Patient was asked if they were taking or using OCP pills or devices. If they answered yes, then they were instructed to stop using OCPs at this pre-operative appointment until 2 months post-op to help prevent DVT development. They understand that there are other forms of birth control that do not involve hormones. They expressed understanding that ignoring/not following this instruction could result in a DVT which could turn into a deadly pulmonary embolism.      The patient was told that narcotic pain medications may make them drowsy and instructions were given to not sign legal documents, drive or operate heavy machinery, cars, or equipment while under the influence of narcotic medications.     As there were no other questions to be asked, she was given my business card along with Sri Roberts MD business card if she has any questions or concerns prior to surger

## 2022-04-03 ENCOUNTER — LAB VISIT (OUTPATIENT)
Dept: FAMILY MEDICINE | Facility: CLINIC | Age: 62
End: 2022-04-03
Payer: COMMERCIAL

## 2022-04-03 DIAGNOSIS — Z96.652 STATUS POST TOTAL LEFT KNEE REPLACEMENT: ICD-10-CM

## 2022-04-03 PROCEDURE — U0003 INFECTIOUS AGENT DETECTION BY NUCLEIC ACID (DNA OR RNA); SEVERE ACUTE RESPIRATORY SYNDROME CORONAVIRUS 2 (SARS-COV-2) (CORONAVIRUS DISEASE [COVID-19]), AMPLIFIED PROBE TECHNIQUE, MAKING USE OF HIGH THROUGHPUT TECHNOLOGIES AS DESCRIBED BY CMS-2020-01-R: HCPCS | Performed by: ORTHOPAEDIC SURGERY

## 2022-04-03 PROCEDURE — U0005 INFEC AGEN DETEC AMPLI PROBE: HCPCS | Performed by: ORTHOPAEDIC SURGERY

## 2022-04-03 NOTE — PROGRESS NOTES
Carleen Kincaid presented to clinic for COVID-19 swab.   Carleen Kincaid verified x2, name and .   Carleen Kincaid instructed on what will be completed, asked if ever had COVID-19 swab.   Explained procedure to Carleen Kincaid.   Specimen obtained.   No questions or concerns voiced further at this time.   Carleen Kincaid left in satisfactory condition.

## 2022-04-04 ENCOUNTER — TELEPHONE (OUTPATIENT)
Dept: SPORTS MEDICINE | Facility: CLINIC | Age: 62
End: 2022-04-04
Payer: COMMERCIAL

## 2022-04-04 PROBLEM — F43.21 GRIEF AT LOSS OF CHILD: Status: ACTIVE | Noted: 2022-04-04

## 2022-04-04 PROBLEM — Z63.4 GRIEF AT LOSS OF CHILD: Status: ACTIVE | Noted: 2022-04-04

## 2022-04-04 LAB
SARS-COV-2 RNA RESP QL NAA+PROBE: NOT DETECTED
SARS-COV-2- CYCLE NUMBER: NORMAL

## 2022-04-04 NOTE — TELEPHONE ENCOUNTER
Called patient and explained that voltaren cannot be taken with ASA. She can take the celebrex 200mg BID x 6 weeks post op and then switch to Voltaren if celebrex is not effective. She cannot take ASA and voltaren together.    She was thankful for the callback and explanation.

## 2022-04-04 NOTE — TELEPHONE ENCOUNTER
Pt confirmed she received arrival time of 7:00am. She also requested the bedside delivery of Celebrex change to diclofenac.

## 2022-04-05 ENCOUNTER — ANESTHESIA (OUTPATIENT)
Dept: SURGERY | Facility: HOSPITAL | Age: 62
DRG: 468 | End: 2022-04-05
Payer: COMMERCIAL

## 2022-04-05 ENCOUNTER — HOSPITAL ENCOUNTER (INPATIENT)
Facility: HOSPITAL | Age: 62
LOS: 1 days | Discharge: HOME | DRG: 468 | End: 2022-04-06
Attending: ORTHOPAEDIC SURGERY | Admitting: ORTHOPAEDIC SURGERY
Payer: COMMERCIAL

## 2022-04-05 DIAGNOSIS — T84.093A FAILED TOTAL LEFT KNEE REPLACEMENT, INITIAL ENCOUNTER: ICD-10-CM

## 2022-04-05 DIAGNOSIS — T84.093D FAILED TOTAL KNEE, LEFT, SUBSEQUENT ENCOUNTER: ICD-10-CM

## 2022-04-05 DIAGNOSIS — I10 ESSENTIAL HYPERTENSION: ICD-10-CM

## 2022-04-05 DIAGNOSIS — M25.561 CHRONIC PAIN OF RIGHT KNEE: ICD-10-CM

## 2022-04-05 DIAGNOSIS — R53.83 FATIGUE, UNSPECIFIED TYPE: ICD-10-CM

## 2022-04-05 DIAGNOSIS — T84.84XA PAINFUL ORTHOPAEDIC HARDWARE: ICD-10-CM

## 2022-04-05 DIAGNOSIS — M17.12 LOCALIZED PRIMARY OSTEOARTHRITIS OF LEFT LOWER LEG: ICD-10-CM

## 2022-04-05 DIAGNOSIS — G89.29 CHRONIC PAIN OF RIGHT KNEE: ICD-10-CM

## 2022-04-05 DIAGNOSIS — M15.9 PRIMARY OSTEOARTHRITIS INVOLVING MULTIPLE JOINTS: ICD-10-CM

## 2022-04-05 DIAGNOSIS — Z96.653 HISTORY OF TOTAL BILATERAL KNEE REPLACEMENT: ICD-10-CM

## 2022-04-05 DIAGNOSIS — L40.50 PSORIATIC ARTHRITIS: ICD-10-CM

## 2022-04-05 PROCEDURE — 11000001 HC ACUTE MED/SURG PRIVATE ROOM

## 2022-04-05 PROCEDURE — 25000003 PHARM REV CODE 250: Performed by: PHYSICIAN ASSISTANT

## 2022-04-05 PROCEDURE — C1713 ANCHOR/SCREW BN/BN,TIS/BN: HCPCS | Performed by: ORTHOPAEDIC SURGERY

## 2022-04-05 PROCEDURE — 64448 NJX AA&/STRD FEM NRV NFS IMG: CPT | Performed by: ANESTHESIOLOGY

## 2022-04-05 PROCEDURE — 63600175 PHARM REV CODE 636 W HCPCS: Performed by: NURSE ANESTHETIST, CERTIFIED REGISTERED

## 2022-04-05 PROCEDURE — 64448 NJX AA&/STRD FEM NRV NFS IMG: CPT | Mod: 59,LT,, | Performed by: ANESTHESIOLOGY

## 2022-04-05 PROCEDURE — 76942 ECHO GUIDE FOR BIOPSY: CPT | Mod: 26,,, | Performed by: ANESTHESIOLOGY

## 2022-04-05 PROCEDURE — 63600175 PHARM REV CODE 636 W HCPCS: Performed by: ORTHOPAEDIC SURGERY

## 2022-04-05 PROCEDURE — 63600175 PHARM REV CODE 636 W HCPCS: Performed by: ANESTHESIOLOGY

## 2022-04-05 PROCEDURE — 64999 UNLISTED PX NERVOUS SYSTEM: CPT | Mod: ,,, | Performed by: ANESTHESIOLOGY

## 2022-04-05 PROCEDURE — 37000009 HC ANESTHESIA EA ADD 15 MINS: Performed by: ORTHOPAEDIC SURGERY

## 2022-04-05 PROCEDURE — 36000711: Performed by: ORTHOPAEDIC SURGERY

## 2022-04-05 PROCEDURE — 97116 GAIT TRAINING THERAPY: CPT

## 2022-04-05 PROCEDURE — 71000033 HC RECOVERY, INTIAL HOUR: Performed by: ORTHOPAEDIC SURGERY

## 2022-04-05 PROCEDURE — D9220A PRA ANESTHESIA: ICD-10-PCS | Mod: ANES,,, | Performed by: ANESTHESIOLOGY

## 2022-04-05 PROCEDURE — 63600175 PHARM REV CODE 636 W HCPCS: Performed by: PHYSICIAN ASSISTANT

## 2022-04-05 PROCEDURE — 94761 N-INVAS EAR/PLS OXIMETRY MLT: CPT

## 2022-04-05 PROCEDURE — 25000003 PHARM REV CODE 250: Performed by: ORTHOPAEDIC SURGERY

## 2022-04-05 PROCEDURE — 64445 NJX AA&/STRD SCIATIC NRV IMG: CPT | Performed by: ANESTHESIOLOGY

## 2022-04-05 PROCEDURE — 25000003 PHARM REV CODE 250: Performed by: NURSE ANESTHETIST, CERTIFIED REGISTERED

## 2022-04-05 PROCEDURE — 37000008 HC ANESTHESIA 1ST 15 MINUTES: Performed by: ORTHOPAEDIC SURGERY

## 2022-04-05 PROCEDURE — 88300 SURGICAL PATH GROSS: CPT | Performed by: PATHOLOGY

## 2022-04-05 PROCEDURE — 27487 PR REVISE KNEE JOINT REPLACE,ALL PARTS: ICD-10-PCS | Mod: 22,LT,, | Performed by: ORTHOPAEDIC SURGERY

## 2022-04-05 PROCEDURE — 99900035 HC TECH TIME PER 15 MIN (STAT)

## 2022-04-05 PROCEDURE — 88300 SURGICAL PATH GROSS: CPT | Mod: 26,,, | Performed by: PATHOLOGY

## 2022-04-05 PROCEDURE — 97535 SELF CARE MNGMENT TRAINING: CPT

## 2022-04-05 PROCEDURE — 71000039 HC RECOVERY, EACH ADD'L HOUR: Performed by: ORTHOPAEDIC SURGERY

## 2022-04-05 PROCEDURE — 76942 PR U/S GUIDANCE FOR NEEDLE GUIDANCE: ICD-10-PCS | Mod: 26,,, | Performed by: ANESTHESIOLOGY

## 2022-04-05 PROCEDURE — D9220A PRA ANESTHESIA: ICD-10-PCS | Mod: CRNA,,, | Performed by: NURSE ANESTHETIST, CERTIFIED REGISTERED

## 2022-04-05 PROCEDURE — D9220A PRA ANESTHESIA: Mod: CRNA,,, | Performed by: NURSE ANESTHETIST, CERTIFIED REGISTERED

## 2022-04-05 PROCEDURE — 27487 REVISE/REPLACE KNEE JOINT: CPT | Mod: 22,LT,, | Performed by: ORTHOPAEDIC SURGERY

## 2022-04-05 PROCEDURE — D9220A PRA ANESTHESIA: Mod: ANES,,, | Performed by: ANESTHESIOLOGY

## 2022-04-05 PROCEDURE — 25000003 PHARM REV CODE 250: Performed by: ANESTHESIOLOGY

## 2022-04-05 PROCEDURE — 64999 PR BLOCK, IPACK: ICD-10-PCS | Mod: ,,, | Performed by: ANESTHESIOLOGY

## 2022-04-05 PROCEDURE — 88300 PR  SURG PATH,GROSS,LEVEL I: ICD-10-PCS | Mod: 26,,, | Performed by: PATHOLOGY

## 2022-04-05 PROCEDURE — 27201423 OPTIME MED/SURG SUP & DEVICES STERILE SUPPLY: Performed by: ORTHOPAEDIC SURGERY

## 2022-04-05 PROCEDURE — 97165 OT EVAL LOW COMPLEX 30 MIN: CPT

## 2022-04-05 PROCEDURE — 36000710: Performed by: ORTHOPAEDIC SURGERY

## 2022-04-05 PROCEDURE — 64448 PR NERVE BLOCK INJ, ANES/STEROID, FEMORAL, CONT INFUSION, INCL IMAG GUIDANCE: ICD-10-PCS | Mod: 59,LT,, | Performed by: ANESTHESIOLOGY

## 2022-04-05 PROCEDURE — 97162 PT EVAL MOD COMPLEX 30 MIN: CPT

## 2022-04-05 PROCEDURE — C1776 JOINT DEVICE (IMPLANTABLE): HCPCS | Performed by: ORTHOPAEDIC SURGERY

## 2022-04-05 PROCEDURE — 27100025 HC TUBING, SET FLUID WARMER: Performed by: ANESTHESIOLOGY

## 2022-04-05 DEVICE — IMPLANTABLE DEVICE: Type: IMPLANTABLE DEVICE | Site: KNEE | Status: FUNCTIONAL

## 2022-04-05 DEVICE — CEMENT BONE W/GENT SIMPLEX HV: Type: IMPLANTABLE DEVICE | Site: KNEE | Status: FUNCTIONAL

## 2022-04-05 DEVICE — STEM ATTUNE PRESSFIT 16X60MM: Type: IMPLANTABLE DEVICE | Site: KNEE | Status: FUNCTIONAL

## 2022-04-05 RX ORDER — FENTANYL CITRATE 50 UG/ML
25-200 INJECTION, SOLUTION INTRAMUSCULAR; INTRAVENOUS
Status: DISCONTINUED | OUTPATIENT
Start: 2022-04-05 | End: 2022-04-05 | Stop reason: HOSPADM

## 2022-04-05 RX ORDER — SODIUM CHLORIDE 9 MG/ML
INJECTION, SOLUTION INTRAVENOUS
Status: COMPLETED | OUTPATIENT
Start: 2022-04-05 | End: 2022-04-05

## 2022-04-05 RX ORDER — SODIUM CHLORIDE 9 MG/ML
INJECTION, SOLUTION INTRAVENOUS CONTINUOUS
Status: DISCONTINUED | OUTPATIENT
Start: 2022-04-05 | End: 2022-04-06 | Stop reason: HOSPADM

## 2022-04-05 RX ORDER — VANCOMYCIN HYDROCHLORIDE 1 G/20ML
INJECTION, POWDER, LYOPHILIZED, FOR SOLUTION INTRAVENOUS
Status: DISCONTINUED | OUTPATIENT
Start: 2022-04-05 | End: 2022-04-05 | Stop reason: HOSPADM

## 2022-04-05 RX ORDER — BISACODYL 10 MG
10 SUPPOSITORY, RECTAL RECTAL EVERY 12 HOURS PRN
Status: DISCONTINUED | OUTPATIENT
Start: 2022-04-05 | End: 2022-04-06 | Stop reason: HOSPADM

## 2022-04-05 RX ORDER — TRANEXAMIC ACID 100 MG/ML
1000 INJECTION, SOLUTION INTRAVENOUS
Status: DISCONTINUED | OUTPATIENT
Start: 2022-04-05 | End: 2022-04-05 | Stop reason: HOSPADM

## 2022-04-05 RX ORDER — FAMOTIDINE 20 MG/1
20 TABLET, FILM COATED ORAL 2 TIMES DAILY
Status: DISCONTINUED | OUTPATIENT
Start: 2022-04-05 | End: 2022-04-06 | Stop reason: HOSPADM

## 2022-04-05 RX ORDER — PROCHLORPERAZINE EDISYLATE 5 MG/ML
5 INJECTION INTRAMUSCULAR; INTRAVENOUS EVERY 6 HOURS PRN
Status: DISCONTINUED | OUTPATIENT
Start: 2022-04-05 | End: 2022-04-06 | Stop reason: HOSPADM

## 2022-04-05 RX ORDER — HYDROMORPHONE HYDROCHLORIDE 1 MG/ML
0.2 INJECTION, SOLUTION INTRAMUSCULAR; INTRAVENOUS; SUBCUTANEOUS EVERY 5 MIN PRN
Status: DISCONTINUED | OUTPATIENT
Start: 2022-04-05 | End: 2022-04-05 | Stop reason: HOSPADM

## 2022-04-05 RX ORDER — FENTANYL CITRATE 50 UG/ML
INJECTION, SOLUTION INTRAMUSCULAR; INTRAVENOUS
Status: DISCONTINUED | OUTPATIENT
Start: 2022-04-05 | End: 2022-04-05

## 2022-04-05 RX ORDER — SODIUM CHLORIDE 0.9 % (FLUSH) 0.9 %
10 SYRINGE (ML) INJECTION
Status: DISCONTINUED | OUTPATIENT
Start: 2022-04-05 | End: 2022-04-06 | Stop reason: HOSPADM

## 2022-04-05 RX ORDER — TRANEXAMIC ACID 100 MG/ML
1000 INJECTION, SOLUTION INTRAVENOUS
Status: COMPLETED | OUTPATIENT
Start: 2022-04-05 | End: 2022-04-05

## 2022-04-05 RX ORDER — ONDANSETRON 2 MG/ML
INJECTION INTRAMUSCULAR; INTRAVENOUS
Status: DISCONTINUED | OUTPATIENT
Start: 2022-04-05 | End: 2022-04-05

## 2022-04-05 RX ORDER — CARBOXYMETHYLCELLULOSE SODIUM 5 MG/ML
SOLUTION/ DROPS OPHTHALMIC
Status: DISCONTINUED | OUTPATIENT
Start: 2022-04-05 | End: 2022-04-05

## 2022-04-05 RX ORDER — LIDOCAINE HYDROCHLORIDE 10 MG/ML
INJECTION, SOLUTION INTRAVENOUS
Status: DISCONTINUED | OUTPATIENT
Start: 2022-04-05 | End: 2022-04-05

## 2022-04-05 RX ORDER — ROPIVACAINE HYDROCHLORIDE 5 MG/ML
INJECTION, SOLUTION EPIDURAL; INFILTRATION; PERINEURAL
Status: COMPLETED | OUTPATIENT
Start: 2022-04-05 | End: 2022-04-05

## 2022-04-05 RX ORDER — ACETAMINOPHEN 500 MG
1000 TABLET ORAL
Status: COMPLETED | OUTPATIENT
Start: 2022-04-05 | End: 2022-04-05

## 2022-04-05 RX ORDER — FENTANYL CITRATE 50 UG/ML
25 INJECTION, SOLUTION INTRAMUSCULAR; INTRAVENOUS EVERY 5 MIN PRN
Status: COMPLETED | OUTPATIENT
Start: 2022-04-05 | End: 2022-04-05

## 2022-04-05 RX ORDER — ROCURONIUM BROMIDE 10 MG/ML
INJECTION, SOLUTION INTRAVENOUS
Status: DISCONTINUED | OUTPATIENT
Start: 2022-04-05 | End: 2022-04-05

## 2022-04-05 RX ORDER — CEFAZOLIN SODIUM/D5W 2 G/100 ML
2 PLASTIC BAG, INJECTION (ML) INTRAVENOUS
Status: COMPLETED | OUTPATIENT
Start: 2022-04-05 | End: 2022-04-06

## 2022-04-05 RX ORDER — KETAMINE HCL IN 0.9 % NACL 50 MG/5 ML
SYRINGE (ML) INTRAVENOUS
Status: DISCONTINUED | OUTPATIENT
Start: 2022-04-05 | End: 2022-04-05

## 2022-04-05 RX ORDER — OXYCODONE HYDROCHLORIDE 10 MG/1
10 TABLET ORAL
Status: DISCONTINUED | OUTPATIENT
Start: 2022-04-05 | End: 2022-04-06 | Stop reason: HOSPADM

## 2022-04-05 RX ORDER — ROPIVACAINE/EPI/CLONIDINE/KET 2.46-0.005
SYRINGE (ML) INJECTION ONCE
Status: DISCONTINUED | OUTPATIENT
Start: 2022-04-05 | End: 2022-04-05 | Stop reason: HOSPADM

## 2022-04-05 RX ORDER — PROPOFOL 10 MG/ML
VIAL (ML) INTRAVENOUS
Status: DISCONTINUED | OUTPATIENT
Start: 2022-04-05 | End: 2022-04-05

## 2022-04-05 RX ORDER — SODIUM CHLORIDE 0.9 % (FLUSH) 0.9 %
10 SYRINGE (ML) INJECTION
Status: DISCONTINUED | OUTPATIENT
Start: 2022-04-05 | End: 2022-04-05 | Stop reason: HOSPADM

## 2022-04-05 RX ORDER — ROPIVACAINE HYDROCHLORIDE 5 MG/ML
INJECTION, SOLUTION EPIDURAL; INFILTRATION; PERINEURAL
Status: COMPLETED
Start: 2022-04-05 | End: 2022-04-05

## 2022-04-05 RX ORDER — NEOSTIGMINE METHYLSULFATE 1 MG/ML
INJECTION, SOLUTION INTRAVENOUS
Status: DISCONTINUED | OUTPATIENT
Start: 2022-04-05 | End: 2022-04-05

## 2022-04-05 RX ORDER — LIDOCAINE HYDROCHLORIDE 10 MG/ML
1 INJECTION, SOLUTION EPIDURAL; INFILTRATION; INTRACAUDAL; PERINEURAL
Status: DISCONTINUED | OUTPATIENT
Start: 2022-04-05 | End: 2022-04-05 | Stop reason: HOSPADM

## 2022-04-05 RX ORDER — METHOCARBAMOL 500 MG/1
1000 TABLET, FILM COATED ORAL ONCE
Status: COMPLETED | OUTPATIENT
Start: 2022-04-05 | End: 2022-04-05

## 2022-04-05 RX ORDER — DEXAMETHASONE SODIUM PHOSPHATE 4 MG/ML
INJECTION, SOLUTION INTRA-ARTICULAR; INTRALESIONAL; INTRAMUSCULAR; INTRAVENOUS; SOFT TISSUE
Status: DISCONTINUED | OUTPATIENT
Start: 2022-04-05 | End: 2022-04-05

## 2022-04-05 RX ORDER — HALOPERIDOL 5 MG/ML
0.5 INJECTION INTRAMUSCULAR EVERY 10 MIN PRN
Status: DISCONTINUED | OUTPATIENT
Start: 2022-04-05 | End: 2022-04-05 | Stop reason: HOSPADM

## 2022-04-05 RX ORDER — AMOXICILLIN 250 MG
1 CAPSULE ORAL 2 TIMES DAILY
Status: DISCONTINUED | OUTPATIENT
Start: 2022-04-05 | End: 2022-04-06 | Stop reason: HOSPADM

## 2022-04-05 RX ORDER — ASPIRIN 81 MG/1
81 TABLET ORAL 2 TIMES DAILY
Status: DISCONTINUED | OUTPATIENT
Start: 2022-04-05 | End: 2022-04-06 | Stop reason: HOSPADM

## 2022-04-05 RX ORDER — METHOCARBAMOL 750 MG/1
750 TABLET, FILM COATED ORAL 3 TIMES DAILY
Status: DISCONTINUED | OUTPATIENT
Start: 2022-04-05 | End: 2022-04-06 | Stop reason: HOSPADM

## 2022-04-05 RX ORDER — CEFAZOLIN SODIUM 1 G/3ML
INJECTION, POWDER, FOR SOLUTION INTRAMUSCULAR; INTRAVENOUS
Status: DISCONTINUED | OUTPATIENT
Start: 2022-04-05 | End: 2022-04-05

## 2022-04-05 RX ORDER — PREGABALIN 75 MG/1
75 CAPSULE ORAL
Status: COMPLETED | OUTPATIENT
Start: 2022-04-05 | End: 2022-04-05

## 2022-04-05 RX ORDER — MUPIROCIN 20 MG/G
1 OINTMENT TOPICAL
Status: COMPLETED | OUTPATIENT
Start: 2022-04-05 | End: 2022-04-05

## 2022-04-05 RX ORDER — POLYETHYLENE GLYCOL 3350 17 G/17G
17 POWDER, FOR SOLUTION ORAL DAILY
Status: DISCONTINUED | OUTPATIENT
Start: 2022-04-05 | End: 2022-04-06 | Stop reason: HOSPADM

## 2022-04-05 RX ORDER — FAMOTIDINE 10 MG/ML
INJECTION INTRAVENOUS
Status: DISCONTINUED | OUTPATIENT
Start: 2022-04-05 | End: 2022-04-05

## 2022-04-05 RX ORDER — CELECOXIB 200 MG/1
400 CAPSULE ORAL
Status: COMPLETED | OUTPATIENT
Start: 2022-04-05 | End: 2022-04-05

## 2022-04-05 RX ORDER — METHOCARBAMOL 750 MG/1
750 TABLET, FILM COATED ORAL 3 TIMES DAILY
Status: DISCONTINUED | OUTPATIENT
Start: 2022-04-05 | End: 2022-04-05

## 2022-04-05 RX ORDER — ROPIVACAINE/EPI/CLONIDINE/KET 2.46-0.005
SYRINGE (ML) INJECTION
Status: DISCONTINUED | OUTPATIENT
Start: 2022-04-05 | End: 2022-04-05 | Stop reason: HOSPADM

## 2022-04-05 RX ORDER — NALOXONE HCL 0.4 MG/ML
0.02 VIAL (ML) INJECTION
Status: DISCONTINUED | OUTPATIENT
Start: 2022-04-05 | End: 2022-04-06 | Stop reason: HOSPADM

## 2022-04-05 RX ORDER — ROPIVACAINE HYDROCHLORIDE 2 MG/ML
INJECTION, SOLUTION EPIDURAL; INFILTRATION; PERINEURAL CONTINUOUS
Status: DISCONTINUED | OUTPATIENT
Start: 2022-04-05 | End: 2022-04-06 | Stop reason: HOSPADM

## 2022-04-05 RX ORDER — PREGABALIN 75 MG/1
75 CAPSULE ORAL NIGHTLY
Status: DISCONTINUED | OUTPATIENT
Start: 2022-04-05 | End: 2022-04-06 | Stop reason: HOSPADM

## 2022-04-05 RX ORDER — OXYCODONE HYDROCHLORIDE 5 MG/1
5 TABLET ORAL
Status: DISCONTINUED | OUTPATIENT
Start: 2022-04-05 | End: 2022-04-05 | Stop reason: HOSPADM

## 2022-04-05 RX ORDER — CEFAZOLIN SODIUM/WATER 2 G/20 ML
2 SYRINGE (ML) INTRAVENOUS
Status: DISCONTINUED | OUTPATIENT
Start: 2022-04-05 | End: 2022-04-05 | Stop reason: HOSPADM

## 2022-04-05 RX ORDER — ONDANSETRON 2 MG/ML
4 INJECTION INTRAMUSCULAR; INTRAVENOUS EVERY 8 HOURS PRN
Status: DISCONTINUED | OUTPATIENT
Start: 2022-04-05 | End: 2022-04-06 | Stop reason: HOSPADM

## 2022-04-05 RX ORDER — MIDAZOLAM HYDROCHLORIDE 1 MG/ML
.5-4 INJECTION INTRAMUSCULAR; INTRAVENOUS
Status: DISCONTINUED | OUTPATIENT
Start: 2022-04-05 | End: 2022-04-05 | Stop reason: HOSPADM

## 2022-04-05 RX ORDER — OXYCODONE HYDROCHLORIDE 5 MG/1
5 TABLET ORAL
Status: DISCONTINUED | OUTPATIENT
Start: 2022-04-05 | End: 2022-04-06 | Stop reason: HOSPADM

## 2022-04-05 RX ORDER — PHENYLEPHRINE HYDROCHLORIDE 10 MG/ML
INJECTION INTRAVENOUS
Status: DISCONTINUED | OUTPATIENT
Start: 2022-04-05 | End: 2022-04-05

## 2022-04-05 RX ORDER — ACETAMINOPHEN 500 MG
1000 TABLET ORAL EVERY 6 HOURS
Status: DISCONTINUED | OUTPATIENT
Start: 2022-04-05 | End: 2022-04-06 | Stop reason: HOSPADM

## 2022-04-05 RX ADMIN — GLYCOPYRROLATE 0.4 MG: 0.2 INJECTION, SOLUTION INTRAMUSCULAR; INTRAVENOUS at 11:04

## 2022-04-05 RX ADMIN — SODIUM CHLORIDE, SODIUM GLUCONATE, SODIUM ACETATE, POTASSIUM CHLORIDE, MAGNESIUM CHLORIDE, SODIUM PHOSPHATE, DIBASIC, AND POTASSIUM PHOSPHATE: .53; .5; .37; .037; .03; .012; .00082 INJECTION, SOLUTION INTRAVENOUS at 11:04

## 2022-04-05 RX ADMIN — FENTANYL CITRATE 25 MCG: 50 INJECTION INTRAMUSCULAR; INTRAVENOUS at 01:04

## 2022-04-05 RX ADMIN — PREGABALIN 75 MG: 75 CAPSULE ORAL at 08:04

## 2022-04-05 RX ADMIN — Medication 2 G: at 05:04

## 2022-04-05 RX ADMIN — HYDROMORPHONE HYDROCHLORIDE 0.2 MG: 1 INJECTION, SOLUTION INTRAMUSCULAR; INTRAVENOUS; SUBCUTANEOUS at 01:04

## 2022-04-05 RX ADMIN — FENTANYL CITRATE 50 MCG: 50 INJECTION, SOLUTION INTRAMUSCULAR; INTRAVENOUS at 12:04

## 2022-04-05 RX ADMIN — ACETAMINOPHEN 1000 MG: 500 TABLET ORAL at 01:04

## 2022-04-05 RX ADMIN — METHOCARBAMOL 750 MG: 750 TABLET ORAL at 08:04

## 2022-04-05 RX ADMIN — MUPIROCIN 1 G: 20 OINTMENT TOPICAL at 08:04

## 2022-04-05 RX ADMIN — OXYCODONE 5 MG: 5 TABLET ORAL at 12:04

## 2022-04-05 RX ADMIN — Medication: at 12:04

## 2022-04-05 RX ADMIN — ROPIVACAINE HYDROCHLORIDE 10 ML: 5 INJECTION, SOLUTION EPIDURAL; INFILTRATION; PERINEURAL at 08:04

## 2022-04-05 RX ADMIN — TRANEXAMIC ACID 1000 MG: 100 INJECTION, SOLUTION INTRAVENOUS at 09:04

## 2022-04-05 RX ADMIN — FENTANYL CITRATE 25 MCG: 50 INJECTION INTRAMUSCULAR; INTRAVENOUS at 12:04

## 2022-04-05 RX ADMIN — PHENYLEPHRINE HYDROCHLORIDE 100 MCG: 10 INJECTION INTRAVENOUS at 09:04

## 2022-04-05 RX ADMIN — CEFAZOLIN 2 G: 330 INJECTION, POWDER, FOR SOLUTION INTRAMUSCULAR; INTRAVENOUS at 09:04

## 2022-04-05 RX ADMIN — FAMOTIDINE 20 MG: 10 INJECTION, SOLUTION INTRAVENOUS at 09:04

## 2022-04-05 RX ADMIN — FENTANYL CITRATE 100 MCG: 50 INJECTION INTRAMUSCULAR; INTRAVENOUS at 08:04

## 2022-04-05 RX ADMIN — CARBOXYMETHYLCELLULOSE SODIUM 2 DROP: 5 SOLUTION/ DROPS OPHTHALMIC at 09:04

## 2022-04-05 RX ADMIN — FAMOTIDINE 20 MG: 20 TABLET, FILM COATED ORAL at 08:04

## 2022-04-05 RX ADMIN — DEXAMETHASONE SODIUM PHOSPHATE 8 MG: 4 INJECTION, SOLUTION INTRAMUSCULAR; INTRAVENOUS at 09:04

## 2022-04-05 RX ADMIN — MIDAZOLAM 2 MG: 1 INJECTION INTRAMUSCULAR; INTRAVENOUS at 08:04

## 2022-04-05 RX ADMIN — ROCURONIUM BROMIDE 50 MG: 10 INJECTION, SOLUTION INTRAVENOUS at 09:04

## 2022-04-05 RX ADMIN — ONDANSETRON 4 MG: 2 INJECTION, SOLUTION INTRAMUSCULAR; INTRAVENOUS at 09:04

## 2022-04-05 RX ADMIN — PHENYLEPHRINE HYDROCHLORIDE 200 MCG: 10 INJECTION INTRAVENOUS at 09:04

## 2022-04-05 RX ADMIN — SODIUM CHLORIDE: 9 INJECTION, SOLUTION INTRAVENOUS at 09:04

## 2022-04-05 RX ADMIN — NEOSTIGMINE METHYLSULFATE 4 MG: 1 INJECTION INTRAVENOUS at 11:04

## 2022-04-05 RX ADMIN — SODIUM CHLORIDE: 0.9 INJECTION, SOLUTION INTRAVENOUS at 08:04

## 2022-04-05 RX ADMIN — ASPIRIN 81 MG: 81 TABLET, COATED ORAL at 08:04

## 2022-04-05 RX ADMIN — TRANEXAMIC ACID 1000 MG: 100 INJECTION, SOLUTION INTRAVENOUS at 11:04

## 2022-04-05 RX ADMIN — METHOCARBAMOL TABLETS 1000 MG: 500 TABLET, COATED ORAL at 01:04

## 2022-04-05 RX ADMIN — CELECOXIB 400 MG: 200 CAPSULE ORAL at 08:04

## 2022-04-05 RX ADMIN — Medication 30 MG: at 09:04

## 2022-04-05 RX ADMIN — VANCOMYCIN HYDROCHLORIDE 1500 MG: 1.5 INJECTION, POWDER, LYOPHILIZED, FOR SOLUTION INTRAVENOUS at 08:04

## 2022-04-05 RX ADMIN — ACETAMINOPHEN 1000 MG: 500 TABLET ORAL at 07:04

## 2022-04-05 RX ADMIN — ACETAMINOPHEN 1000 MG: 500 TABLET ORAL at 08:04

## 2022-04-05 RX ADMIN — SENNOSIDES AND DOCUSATE SODIUM 1 TABLET: 50; 8.6 TABLET ORAL at 08:04

## 2022-04-05 RX ADMIN — PROPOFOL 200 MG: 10 INJECTION, EMULSION INTRAVENOUS at 09:04

## 2022-04-05 RX ADMIN — SODIUM CHLORIDE, SODIUM GLUCONATE, SODIUM ACETATE, POTASSIUM CHLORIDE, MAGNESIUM CHLORIDE, SODIUM PHOSPHATE, DIBASIC, AND POTASSIUM PHOSPHATE: .53; .5; .37; .037; .03; .012; .00082 INJECTION, SOLUTION INTRAVENOUS at 09:04

## 2022-04-05 RX ADMIN — VANCOMYCIN HYDROCHLORIDE 1500 MG: 1.5 INJECTION, POWDER, LYOPHILIZED, FOR SOLUTION INTRAVENOUS at 09:04

## 2022-04-05 RX ADMIN — LIDOCAINE HYDROCHLORIDE 100 MG: 10 INJECTION, SOLUTION INTRAVENOUS at 09:04

## 2022-04-05 RX ADMIN — SODIUM CHLORIDE: 0.9 INJECTION, SOLUTION INTRAVENOUS at 04:04

## 2022-04-05 RX ADMIN — FENTANYL CITRATE 100 MCG: 50 INJECTION, SOLUTION INTRAMUSCULAR; INTRAVENOUS at 09:04

## 2022-04-05 NOTE — NURSING TRANSFER
Nursing Transfer Note      4/5/2022     Reason patient is being transferred: Post Op Pain Control     Transfer To: 301 From: PP3    Transfer via bed    Transfer with N/A    Transported by JHONATHAN Najera    Medicines sent: N/A    Any special needs or follow-up needed: Pain Control     Chart send with patient: Yes    Notified: spouse at bedside    Patient reassessed at: 4/5/22,1545    Upon arrival to floor: patient oriented to room, call bell in reach and bed in lowest position. RN at bedside. All questions answered.

## 2022-04-05 NOTE — TRANSFER OF CARE
"Anesthesia Transfer of Care Note    Patient: Carleen Kincaid    Procedure(s) Performed: Procedure(s) (LRB):  REVISION, ARTHROPLASTY, KNEE (Left)    Patient location: PACU    Anesthesia Type: general    Transport from OR: Transported from OR on room air with adequate spontaneous ventilation. Transported from OR on 6-10 L/min O2 by face mask with adequate spontaneous ventilation    Post pain: adequate analgesia    Post assessment: no apparent anesthetic complications and tolerated procedure well    Post vital signs: stable    Level of consciousness: awake    Nausea/Vomiting: no nausea/vomiting    Complications: none    Transfer of care protocol was followed      Last vitals:   Visit Vitals  /65   Pulse 78   Temp 36.7 °C (98 °F) (Oral)   Resp 13   Ht 5' 0.5" (1.537 m)   Wt 83.9 kg (185 lb)   SpO2 100%   Breastfeeding No   BMI 35.54 kg/m²     "

## 2022-04-05 NOTE — DISCHARGE INSTRUCTIONS
[employer] CARE CUBE COLD THERAPY SYSTEM    The Polar Care Cube Cold Therapy System is simple and reliable. It is easy to use, compact design makes it great for home use. With the addition of ice and water, you will enjoy 6-8 hours of effortless cold therapy. Proper use requires an insulation barrier between the pad and the patient's skin.  Instructions on how to use the Polar Care Cube Cold Therapy System Below:

## 2022-04-05 NOTE — PLAN OF CARE
Patient tolerated PT session fair due to difficulty maintaining alertness level due to being sleepy. Patient ambulated 4 side steps x2 trials along edge of bed with RW and minimal assistance due to patient falling asleep. Patient needs an OPPT appointment scheduled.  present during PT session.       Problem: Physical Therapy  Goal: Physical Therapy Goal  Description: Goals to be met by: 2022    Patient will increase functional independence with mobility by performin. Supine to sit with supervision  2. Sit to stand transfer with Supervision  3. Gait x200 feet with Supervision using Rolling Walker  4. Ascend/Descend 1 curb step with Stand by assistance using Rolling Walker  5. Lower extremity exercise program x30 reps per handout, with supervision        Outcome: Ongoing, Progressing

## 2022-04-05 NOTE — PLAN OF CARE
Problem: Occupational Therapy  Goal: Occupational Therapy Goal  Description: Goals to be met by: 4/8/22    Patient will increase functional independence with ADLs by performing:    UE Dressing with Modified [place].  LE Dressing with Supervision.  Grooming while standing at sink with Modified [place].  Toileting from toilet with Modified [place] for hygiene and clothing management.   Toilet transfer to toilet with Modified [place].    Outcome: Ongoing, Progressing    OT evaluation with goals established. Patient completed bed mobility at contact guard assistance. She completed toilet transfer/task at contact guard assistance along with grooming task in standing.

## 2022-04-05 NOTE — ANESTHESIA PROCEDURE NOTES
Intubation    Date/Time: 4/5/2022 9:13 AM  Performed by: Noa Julio CRNA  Authorized by: Joe Thomas MD     Intubation:     Induction:  Intravenous    Intubated:  Postinduction    Mask Ventilation:  Easy mask    Attempts:  1    Attempted By:  CRNA    Method of Intubation:  Direct    Blade:  Triana 2    Laryngeal View Grade: Grade I - full view of cords      Difficult Airway Encountered?: No      Complications:  None    Airway Device:  Oral endotracheal tube    Airway Device Size:  7.0    Style/Cuff Inflation:  Cuffed    Inflation Amount (mL):  6    Tube secured:  21    Secured at:  The lips    Placement Verified By:  Capnometry    Findings Post-Intubation:  BS equal bilateral

## 2022-04-05 NOTE — PLAN OF CARE
Pre op complete. Questions answered. Resting comfortably. Call light in reach. Belongings given to . Pt has walker at home

## 2022-04-05 NOTE — ANESTHESIA PROCEDURE NOTES
Left adductor canal catheter    Patient location during procedure: pre-op   Block not for primary anesthetic.  Reason for block: at surgeon's request and post-op pain management   Post-op Pain Location: Left knee pain   Start time: 4/5/2022 8:45 AM  Timeout: 4/5/2022 8:40 AM   End time: 4/5/2022 8:55 AM    Staffing  Authorizing Provider: Joe Thomas MD  Performing Provider: Joe Thomas MD    Preanesthetic Checklist  Completed: patient identified, IV checked, site marked, risks and benefits discussed, surgical consent, monitors and equipment checked, pre-op evaluation and timeout performed  Peripheral Block  Patient position: supine  Prep: ChloraPrep and site prepped and draped  Patient monitoring: heart rate, cardiac monitor, continuous pulse ox, continuous capnometry and frequent blood pressure checks  Block type: adductor canal  Laterality: left  Injection technique: continuous  Needle  Needle type: Tuohy   Needle gauge: 17 G  Needle length: 3.5 in  Needle localization: anatomical landmarks and ultrasound guidance  Catheter type: spring wound  Catheter size: 19 G  Test dose: lidocaine 1.5% with Epi 1-to-200,000 and negative   -ultrasound image captured on disc.  Assessment  Injection assessment: negative aspiration, negative parasthesia and local visualized surrounding nerve  Paresthesia pain: none  Heart rate change: no  Slow fractionated injection: yes  Pain Tolerance: comfortable throughout block and no complaints  Medications:    Medications: ropivacaine (NAROPIN) injection 0.5% - Perineural   10 mL - 4/5/2022 8:55:00 AM    Additional Notes  VSS.  [facility] RN monitoring vitals throughout procedure.  Patient tolerated procedure well.     15 mL ropivacaine 0.25% w/ epi 1:200k

## 2022-04-05 NOTE — PATIENT INSTRUCTIONS
[address], [place]                                                                                          [phone]                   Postoperative Instructions for Knee Surgery                 Your Surgery Included:   Open               Arthroscopic   [] Ligament Repair       [] Diagnostic           [] ACL     [] PCL     [] MCL     [] PLLC      [] Synovectomy / Plica Removal [] Meniscal Cartilage Repair / Transplantation      [] Lysis of Adhesions / Manipulation [] Articular Cartilage Repair      [] Interval Release           [] Cartimax       [] OATS   [] KAMILLA      [] Meniscectomy           [] Osteochondral Allograft      [] Meniscal Cartilage Repair  [] Patellar Realignment       [] Debridement / Chondroplasty         [] Lateral Release   [] Ligament Repair      [] Articular Cartilage Repair          [] Extensor Mechanism             []   Microfracture  []  OATS         []  Cartilage Biopsy [] Tendon Repair          [] Ligament Reconstruction          [] Patella                  [] Quadriceps             []   ACL    []   PCL  [] High Tibial Osteotomy       [] Subchondroplasty  [x] Revision Joint Replacement         [] Amniox Arthrocentesis           [] Unicompartmental   [] Patellofemoral                  Call our office ([phone]) immediately or message through [facility] if you experience any of the following:       Excessive bleeding or pus like drainage at the incision site       Uncontrollable pain not relieved by pain medication       Excessive swelling or redness at the incision site       Fever above 101.5 degrees not controlled with Tylenol or Motrin       Shortness of Breath or severe calf pain       Any foul odor or blistering from the surgery site    FOR EMERGENCIES: [facility] is the best way to contact us. If on the weekend, page the  at [phone] who will direct your call appropriately.    1.   Pain Management: A cold therapy cuff, pain  medications, local injections, TENs unit, and in some cases, regional anesthesia injections are used to manage your post-operative pain. The decision to use each of these options is based on their risks and benefits.     Medications: You were given one or more of the following medication prescriptions during your preoperative appointment. Follow the instructions on the bottles.     Narcotic Medication (usually Percocet, Norco, or Tramadol): Begin taking the medication before your knee starts to hurt. Some patients do not like to take any medication, but if you wait until your pain is severe before taking, you will be very uncomfortable for several hours waiting for the narcotic to work. Always take with food.     Nausea / Vomiting: For this issue, we prescribe Phenergan or Zofran, use this medication as directed as needed for nausea.     Cold Therapy: You may have been sent home with a [email or web address] Care® cold therapy unit and wrap for your knee. Fill with ice and water to the indicated fill line. You can use 20-30 minutes on then off, several times a day. This will help relieve pain and control swelling. Do not sleep with on.     Regional Anesthesia Injections (Blocks): You may have been given a regional nerve block/catheter either before or after surgery. This may make your entire leg numb for 2-5 days.                           * Proceed with caution when bearing weight on your leg.     2.   Diet: Eat a bland diet for the first day after surgery. Progress your diet as tolerated. Constipation may occur with Narcotic usage. We recommend Colace 100mg twice a day while taking narcotics.    3.   Activity: Limit your activity during the first 48 hours, keep your leg elevated with pillows under your heel. After the first 48 hours at home, increase your activity level based on your symptoms.    4.   Dressing: (c) The soft, bulky dressing will be removed on the 3rd day after surgery. The Aquacel dressing (tan, long adhesive  "bandage) will remain on until the 1st post operative appointment. Do not remove. It is normal for some blood to be seen on the dressings. It is also normal for you to see apparent bruising on the skin around your incisions. If you are concerned by the drainage or the appearance of your wound site, you can send a picture via [facility].    5.   Shower: (c) You may shower on the 3rd day after surgery. The Aquacel bandage is to be covered with saran wrap before showering. Do not submerge limb in any water for 4 weeks or until incisions completely healed.  Do not get bandage wet.    6.   Your procedure did not require a post-operative brace.    7.   Your procedure did not require a Continuous Passive Motion (CPM) device.    8.   Weight Bearing: You may have been sent home with crutches. If instructed (see below), use these crutches at all times unless at complete rest.      [] Non-weight bearing for     0 weeks (you may touch your toes to the floor)      [] Partial weight bearing for  0 weeks    [] 25% Body Weight   [] 50% Body Weight      [x] Full weight bearing            [x]  NOW    []  after 0 weeks     9.  Knee Exercises: Begin these exercises the first day after surgery in order to help you regain your motion and strength. You may do the following marked exercises:     [x] Quad Sets - Begin activating your quadriceps muscle by driving your          knee downward into full knee extension while seated on a table or bed   with a towel rolled and propped under your heel     [x] Straight Leg Raise (SLR) - While erwin your quadriceps muscle, lift     your fully extended leg to the level of your non-operative knee (as shown)     [x] Heel Slides - With the knee straight, slide your heel slowly toward your   buttocks, hold at the endpoint for 10-15 seconds, then slowly straighten     [x] Ankle pumps - With your knee straight, move your ankle in a "pumping"    fashion to activate your calf and leg muscles      10.  " Physical Therapy: Physical therapy is an essential component to your recovery from surgery. Your physical therapy will start in 1 days.    FIRST POSTOPERATIVE VISIT: As scheduled.

## 2022-04-05 NOTE — BRIEF OP NOTE
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]    Estimated Blood Loss: 200 mL         Specimens:   Specimen (24h ago, onward)             Start     Ordered    04/05/22 1138  Specimen to Pathology, Surgery Orthopedics  Once        Comments: Pre-op Diagnosis: Failed total left knee replacement, 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    DISPOSITION: Home or Self Care    FINAL DIAGNOSIS:  <principal problem not specified>    FOLLOWUP: In clinic    DISCHARGE INSTRUCTIONS:  No discharge procedures on file.

## 2022-04-05 NOTE — OP NOTE
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     CO-SURGEON:  Jeovany Jauregui M.D. - Resident     FIRST ASSISTANT:  Светлана Agarwal PA-C-assistant.       PREOPERATIVE DIAGNOSIS:  Failed Left total knee replacement.     POSTOPERATIVE DIAGNOSIS:  Failed Left total knee replacement.     PROCEDURES PERFORMED:  Left complex revision total knee replacement.     ANESTHESIA:  Adductor block with catheter, concomitant general with LMA and local BLOSSOM 50 cc      FLUIDS IN THE CASE:  2000 mL LR.     ESTIMATED BLOOD LOSS:  200 mL.     URINE OUTPUT:  0     TOURNIQUET TIME:  23 minutes.     COMPLICATIONS:  None.     IMPLANTS UTILIZED:  Hardy cement with gentamicin was utilized x 2,   Tibial components: LEFT ATTUNE metaphyseal sleeve porous 28 mm, ATTUNE tibial insert rotating platform size 5, 10 mm component, Tibial tray rotating platform MBT revision size 5 cemented component, ATTUNE fluted 14 mm x 60 mm tibial component     Femoral components: ATTUNE size 5 LEFT Cemented  ATTUNE fluted 18 x 60 mm stem, Femoral sleeve full porous 30mm component  +4 mm medial, lateral posterior and distal femoral augments       INDICATIONS FOR OPERATIVE PROCEDURE:  Carleen Kincaid is a 61-year-old female with history of Left knee pain  and pathology.  She was noted to have significant   problems in the area of concern following previous ConforMIS implant placement;   she had significant loss of the tibial control with subsidence of the tibial   component and radiographic assessments demonstrating loosened component with   cystic formation along the proximal medial tibial region.  There were difficulties with aggressive loads as well as instability of the knee, the patient was deemed to be an appropriate candidate for operative Intervention. Preoperative CT scan and aspiration with synovasure and cultures were negative.     Complexity of case was increased based on the revision nature of procedure and   change in overall bony anatomy secondary to previous operative intervention.     DESCRIPTION OF PROCEDURE:  The patient was brought into the Operating Room,   placed in supine position.  Upon application of adductor block in the   preoperative hold area, the patient underwent general sedation and placement of   LMA to stabilize the airway.  The patient was given the appropriate dose of   antibiotics based on body weight.  Time-out was utilized to verify Left side as   the operative site.  Both upper extremities were placed in comfortable   position.  The right leg was carefully padded along the heel and peroneal nerve   regions.  The left leg was then prepped and draped in a sterile fashion with   ChloraPrep material with a bump under the left hip and lateral post for   intraoperative positioning as well as a popliteal post along the left side of   the table.  Ioban was placed over the knee.  Medial based incision was carried   down through the skin down the fat and fascia.  Flaps were then raised   superiorly, inferiorly, medially and laterally along the area of concern.  We   then exposed the medial portion of the knee and performed a medial subvastus   approach to the knee.  We performed significant scar release along the   intra-articular  portion of the knee along the patellofemoral component and they   were allowed to perform an anterior interval release and then subluxed the   patella laterally and took the knee into flexion.  We then used osteotomes to   carefully remove the well cemented femoral component.  We then assessed the   tibial component, which demonstrated significant loosening of the tibial   component with cystic formation along the proximal medial tibial region, this   was removed.  We removed the granulation tissue along the cystic region of the   proximal tibia and then retained cement along the lateral aspect of the proximal   tibia.     We exposed the proximal tibia and then reamed centrally along the tibia to size   diameter of 14 mm and also reamed for the ATTUNE trial sleeve with the conical   We then contoured our cut, removing a small portion of the   bone laterally, but leaving most of the bone medially. With the canal exposed,   we placed a size 5 trial component along the proximal tibia; this most normalized   the patient's anatomy from the medial to lateral cortical rims of the bone.  With the trial sleeve, 28 mm and the 14 mm stem of 60 mm in length, we then applied the trial component. A trial keel was applied.     We turned our attention proximally to the femoral region where we reamed   centrally up to a diameter of 16 mm.  We used the conical reamer in the distal  Femoral region as well and the placed the trial broach. We then created posterior condylar cuts medially and laterally as well as chamfer cuts and needed to augment the distal femoral region with an 4 mm augments medially and laterally, the posterior condylar regions bilaterally needed to be augmented with a 4 mm augment.  Medially and laterally, we needed to augment with a 4-mm distal augments.      As a result, we made a box cut for a the ATTUNE femoral implant removing the remnants of the PCL structures as well as remnants of bone structures  posteriorly.  Care was  taken to stay clear off the neurovascular structures.      At this point, we turned used the esmarch to exsanguinate the limb and then raised the   Tourniquet. We removed the trial components and left them assembled as guides to assemble the actual components. On the back table with the 4 mm distal augments, the 4 mm medial/lateral distal augments and bilateral posterior augments of 4 mm.  Once again, a 16 mm diameter x 60 mm length stem was applied with a 30 mm porous coat trial sleeve.  We tapped this into position and assessed for stability with a 10 mm insert and felt that this demonstrated excellent stability with good tracking of the patella with full extension and normal varus and valgus stability in particular.      We then assembled our actual components on the back table, mixed cement, dried the   surface and then impacted bone graft into the proximal medial tibial region and   the cystic area using cancellous bone chips.  Cement was applied to the   undersurface of the tibial component taking care to stay clear of the porous   portion of the sleeve and we impacted it in position.  We then repeated this   process proximally for the femoral component once again staying clear of the   porous coated portion of the sleeve.  Excellent stability was achieved, cement   was hardened.  Extruded cement was removed.  We then trialed once again   demonstrated a 10 mm rotating platform more stabilized the patient's knee.  As a  result, we then removed the trial and placed the actual liner into the area of   concern with excellent stability achieved.  Tourniquet was released.  Bleeding   sites were stabilized with Aquamantys probe.  We then placed additional BLOSSOM   mixture in the posterior aspect of the capsule structure intracapsularly along   the tissues.  A total of 50 mL BLOSSOM mixture 21-gauge needle.       Synovial layer was closed with a series of #1 Vicryl sutures placed in    figure-of-eight fashion.  See the parapatellar tendon region was closed with a   series of #1 Vicryl placed in figure-of-eight fashion.  We then closed the   subvastus parapatellar tendon region as well as the VMO fascia with a #1   Stratafix suture placed in a running fashion.  Subcutaneous tissues were closed   with 3-0 Vicryls placed in inverted fashion.  Skin was closed with running 3-0   Monocryl sutures placed in subcuticular fashion along with application of   Dermabond ointment and an aquacel bandage.  We applied gauze, ABD pads, cast   padding and long-leg SUSIE hose stocking and cooling unit.  The patient was   allowed to recover from the anesthetic and was extubated and was taken to   Recovery Room in stable condition.  At the completion of the case, all   instrument and sponge counts were correct.     NOTE:  Dr. Sri Roberts was present for the key portions of the procedure and did   perform the key parts of the procedure.     PHYSICAL THERAPY:  Begin physical therapy on postop day # 0 while in the   hospital, with weightbearing as tolerated status along the left leg.     Outpatient therapy should be initiated in 3 to 5 days following discharge.     WEIGHTBEARING STATUS:  Full weightbearing as tolerated on the right leg with   walker or crutches.     Full ROM As tolerated, ankle pumps and heel slides as tolerated      Significant Surgical Tasks Conducted by the Assistant(s), if Applicable: preparation and closure    Estimated Blood Loss (EBL): 200 mL           Implants:   Implant Name Type Inv. Item Serial No.  Lot No. LRB No. Used Action   CEMENT BONE W/GENT SIMPLEX HV - [id number]  CEMENT BONE W/GENT SIMPLEX HV  Tasted Menu. [id number] Left 2 Implanted   SYS KNEE EPAK PIN ATTUNE - [id number]  SYS KNEE EPAK PIN ATTUNE  [employer] [id number] Left 1 Implanted and Explanted   SLEEVE ATTUNE FEM COATED 29MM - [id number]  SLEEVE ATTUNE FEM COATED 29MM  SYNTHES [id number] Left 1 Implanted   Revision  CRS Femoral Size 5 Left Cemented    [employer] [id number] Left 1 Implanted   STEM ATTUNE PRESSFIT [id number] - [id number]  STEM ATTUNE PRESSFIT [id number]  SYNTHES [id number] Left 1 Implanted   SLEEVE ATTUNE FEM COATED 30MM - [id number]  SLEEVE ATTUNE FEM COATED 30MM  [employer] [id number] Left 1 Implanted   Attune Knee System Revision Posterior Femoral Augment 4mm cemented    [employer] [id number] Left 2 Implanted   Revision Distal Femoral Augment 4mm Cemented    [employer] [id number] Left 1 Implanted   Revision Distal Femoral Augment    [employer] [id number] Left 1 Implanted   Revision Tibial Base Rotating Platform size 5 cemented    [employer] [id number] Left 1 Implanted   Revision Pressfit Stem [id number]    [employer] [id number] Left 1 Implanted   Revision CRS Rotating Platofrm Insert 10mm    [employer] 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    Attestation: I was present and scrubbed for the key portions of the procedure.    Discharge Note    OUTCOME: Patient tolerated treatment/procedure well without complication and is now ready for discharge.    DISPOSITION: Admitted as an Inpatient    FINAL DIAGNOSIS:  Failed total left knee replacement    FOLLOWUP: In clinic    DISCHARGE INSTRUCTIONS:  No discharge procedures on file.

## 2022-04-05 NOTE — PLAN OF CARE
Problem: Adult Inpatient Plan of Care  Goal: Plan of Care Review  4/5/2022 1637 by Denise Jackson RN  Outcome: Ongoing, Progressing  Flowsheets (Taken 4/5/2022 1637)  Plan of Care Reviewed With: patient     Problem: Adult Inpatient Plan of Care  Goal: Patient-Specific Goal (Individualized)  4/5/2022 1637 by Denise Jackson RN  Outcome: Ongoing, Progressing  Flowsheets (Taken 4/5/2022 1637)  Anxieties, Fears or Concerns: General  Individualized Care Needs: Keep  to date with plan of care  Patient-Specific Goals (Include Timeframe): Pain control     Problem: Pain Acute  Goal: Acceptable Pain Control and Functional Ability  Intervention: Develop Pain Management Plan  Flowsheets (Taken 4/5/2022 1637)  Pain Management Interventions:   care clustered   breathing exercises utilized     Problem: Adjustment to Surgery (Knee Arthroplasty)  Goal: Optimal Coping  Intervention: Support Psychosocial Response to Surgery and Mobility Changes  Flowsheets (Taken 4/5/2022 1637)  Supportive Measures: active listening utilized     Problem: Pain (Knee Arthroplasty)  Goal: Acceptable Pain Control  Intervention: Prevent or Manage Pain  Flowsheets (Taken 4/5/2022 1637)  Pain Management Interventions:   care clustered   breathing exercises utilized      PT received AAO x 4. VSS. Bed locked and in lowest position. Oriented to room and callbell.  IV intact and infusing free of redness and irritation. Fall precautions maintained. Non skid socks on while out of bed. Pt instructed to call for assistance, skin integrity maintained, polar ice maintained . No other complaints or concerns, will continue to follow careplan. Callbell placed within reach and use encouraged.

## 2022-04-05 NOTE — PT/OT/SLP EVAL
Physical Therapy Evaluation and Treatment     Patient Name:  Carleen Kincaid   MRN:  [id number]    Recommendations:     Discharge Recommendations:  outpatient PT   Discharge Equipment Recommendations: walker, rolling   Barriers to discharge: difficulty maintaining alertness level to actively participate in PT session     Assessment:     Carleen Kincaid is a 61 y.o. female admitted with a medical diagnosis of Failed total left knee replacement.  She presents with the following impairments/functional limitations:  weakness, impaired functional mobilty, gait instability, impaired balance, decreased lower extremity function, pain, decreased ROM, impaired skin, orthopedic precautions. Patient tolerated PT session fair due to difficulty maintaining alertness level due to being sleepy. Patient ambulated 4 side steps x2 trials along edge of bed with RW and minimal assistance due to patient falling asleep. Patient needs an OPPT appointment scheduled.  present during PT session.     Rehab Prognosis: Good; patient would benefit from acute skilled PT services to address these deficits and reach maximum level of function.    Recent Surgery: Procedure(s) (LRB):  REVISION, ARTHROPLASTY, KNEE (Left) Day of Surgery    Plan:     During this hospitalization, patient to be seen daily to address the identified rehab impairments via gait training, therapeutic activities, therapeutic exercises and progress toward the following goals:    · Plan of Care Expires:  04/08/22    Subjective     Chief Complaint: Left knee pain. Sleepy.   Patient/Family Comments/goals: To walk without pain.   Pain/Comfort:  · Pain Rating 1:  (yes but did not rate with number)  · Location - Side 1: Left  · Location 1: knee  · Pain Addressed 1: Pre-medicate for activity, Reposition, Distraction    Patients cultural, spiritual, Christian conflicts given the current situation:  n/a    Living Environment:  Patient lives in a [facility] with no steps to enter. Prior to  admission, patients level of function was modified independent with rollator for functional mobility. Equipment used at home: wheelchair, rollator. Upon discharge, patient will have assistance from .    Objective:     Communicated with RN prior to session.  Patient found supine with perineural catheter, cryotherapy, FCD, SCD, blood pressure cuff, telemetry (Nimbus) upon PT entry to room.  present in room.     Upon PT entry in the room the patient was found sleeping. Per , the patient fell asleep mid bite of sandwich awhile ago. PT woke patient up and she stated that she had to finish chewing because she still had a bite of sandwich in her mouth.    General Precautions: Standard, fall   Orthopedic Precautions:LLE weight bearing as tolerated   Braces: N/A    Exams:  · Cognitive Exam:  Patient is oriented to Person, Place, Time and Situation  · Gross Motor Coordination:  WFL  · Sensation:    · -       Intact  · RLE ROM: WFL  · RLE Strength: WFL  · LLE ROM: WFL except limited at knee due to pain  · LLE Strength: appears WFL but did not formally assess due to pain and recent surgery     Functional Mobility:  · Bed Mobility:     · Scooting: contact guard assistance  · Supine to Sit: contact guard assistance  · Sit to Supine: contact guard assistance  · Transfers:     · Sit to Stand:  minimal assistance with rolling walker x2 from edge of bed  · Gait:  Patient ambulated 4 side steps along edge of bed x2 trials with Rolling Walker and minimal assistance using 3-point gait. Patient demonstrated decreased ivan and decreased step length during gait due to pain, decreased ROM and decreased strength. Patient needed constant verbal cues to stay awake while taking side steps.     Therapeutic Activities and Exercises:  Patient falling asleep while sitting on edge of bed and standing. RN notified.     Patient and  educated in:  -PT role and POC  -safety with transfers including hand placement  -gait  sequencing and RW management  -OOB activity to maximize recovery including ambulating with nursing staff assistance and RW while in the hospital       AM-PAC 6 CLICK MOBILITY  Total Score:17     Patient left supine with all lines intact, call button in reach and RN and  present.    GOALS:   Multidisciplinary Problems     Physical Therapy Goals        Problem: Physical Therapy    Goal Priority Disciplines Outcome Goal Variances Interventions   Physical Therapy Goal     PT, PT/OT Ongoing, Progressing     Description: Goals to be met by: 2022    Patient will increase functional independence with mobility by performin. Supine to sit with supervision  2. Sit to stand transfer with Supervision  3. Gait x200 feet with Supervision using Rolling Walker  4. Ascend/Descend 1 curb step with Stand by assistance using Rolling Walker  5. Lower extremity exercise program x30 reps per handout, with supervision                         History:     Past Medical History:   Diagnosis Date    Anxiety     Degenerative disc disease, cervical     Depression     General anesthetics causing adverse effect in therapeutic use     NO DEMEROL    Hyperlipidemia     Hypertension     Internal derangement of right knee 2017    Joint pain 2014    Kidney stones     Knee pain 2017    Psoriasis 1983    Right knee pain 2015    Stroke     retinal stroke       Past Surgical History:   Procedure Laterality Date    APPENDECTOMY      HYSTERECTOMY      partial    JOINT REPLACEMENT Bilateral     Knees    KNEE ARTHROSCOPY Right 2015    Dr Roberts    lithtotrispy      LUMBAR SPINE SURGERY  10/2014    L4-L5 fusion       Time Tracking:     PT Received On: 22  PT Start Time: 1443     PT Stop Time: 1504  PT Total Time (min): 21 min     Billable Minutes: Evaluation 10 and Gait Training 11      2022

## 2022-04-05 NOTE — PT/OT/SLP EVAL
"Occupational Therapy   Evaluation    Name: Carleen Kincaid  MRN: [id number]  Admitting Diagnosis:  Failed total left knee replacement Day of Surgery    Recommendations:     Discharge Recommendations: home  Discharge Equipment Recommendations:  walker, rolling  Barriers to discharge:  None    Assessment:     Carleen Kincaid is a 61 y.o. female with a medical diagnosis of Failed total left knee replacement.  She presents s/p left revision TKA.Patient completed bed mobility at contact guard assistance. She completed toilet transfer/task at contact guard assistance along with grooming task in standing. Patient would benefit from skilled OT needs s/p left revision TKA to increase independence with ADLs and ADL transfers.  Performance deficits affecting function: weakness, impaired functional mobilty, gait instability, impaired balance, impaired self care skills, decreased lower extremity function, decreased ROM.      Rehab Prognosis: Good; patient would benefit from acute skilled OT services to address these deficits and reach maximum level of function.       Plan:     Patient to be seen daily to address the above listed problems via self-care/home management, therapeutic activities, therapeutic exercises  · Plan of Care Expires: 04/08/22  · Plan of Care Reviewed with: patient, spouse    Subjective     Chief Complaint: "pain in knee"   Patient/Family Comments/goals: "walk in grocery store"     Occupational Profile:  Living Environment: Patient lives with their spouse in 1 level home with 0 steps to enter and has a walk in shower   Previous level of function: independent with ADLs, previous right and left TKA  Roles and Routines: : desk work    Equipment Used at Home:  rollator, wheelchair  Assistance upon Discharge:      Pain/Comfort:  · Pain Rating 1: 5/10  · Location - Side 1: Left  · Location - Orientation 1: generalized  · Location 1: knee  · Pain Addressed 1: Pre-medicate for activity, " Reposition, Distraction    Patients cultural, spiritual, Christianity conflicts given the current situation: no    Objective:     Communicated with: Nursing prior to session.  Patient found supine with perineural catheter, cryotherapy, FCD, SCD, peripheral IV (Nimbus) upon OT entry to room.    General Precautions: Standard, fall   Orthopedic Precautions:LLE weight bearing as tolerated   Braces: N/A     Occupational Performance:    Bed Mobility:    · Patient completed Scooting/Bridging with contact guard assistance  · Patient completed Supine to Sit with contact guard assistance    Functional Mobility/Transfers:  · Patient completed Sit <> Stand Transfer with contact guard assistance  with  rolling walker   · Patient completed Toilet Transfer Step Transfer technique with contact guard assistance with  rolling walker   · Patient completed chair transfer with step transfer technique with contact guard assistance with rolling walker   · Functional Mobility: patient ambulated to/from bathroom with use of rolling walker at contact guard assistance     Activities of Daily Living:  · Grooming: contact guard assistance standing at sink to wash hands   · Toileting: contact guard assistance completed on bedside commode placed over toilet     Cognitive/Visual Perceptual:  Cognitive/Psychosocial Skills:     -       Oriented to: Person, Place and Time   -       Follows Commands/attention:Follows multistep  commands    Physical Exam:  Upper Extremity Range of Motion:     -       Right Upper Extremity: WFL  -       Left Upper Extremity: WFL  Upper Extremity Strength:    -       Right Upper Extremity: WFL  -       Left Upper Extremity: WFL    AMPAC 6 Click ADL:  AMPAC Total Score: 19    Treatment & Education:  Patient educated on hand placement for transfers    Patient educated on rolling walker placement for ADLs  Patient educated on [place] ice use   Patient educated on role OT and plan of care s/p surgery at [facility],  white board updated as needed       Patient left up in chair with all lines intact, call button in reach, RN notified and  present    GOALS:   Multidisciplinary Problems     Occupational Therapy Goals        Problem: Occupational Therapy    Goal Priority Disciplines Outcome Interventions   Occupational Therapy Goal     OT, PT/OT Ongoing, Progressing    Description: Goals to be met by: 4/8/22    Patient will increase functional independence with ADLs by performing:    UE Dressing with Modified [place].  LE Dressing with Supervision.  Grooming while standing at sink with Modified [place].  Toileting from toilet with Modified [place] for hygiene and clothing management.   Toilet transfer to toilet with Modified [place].                     History:     Past Medical History:   Diagnosis Date    Anxiety     Degenerative disc disease, cervical     Depression     General anesthetics causing adverse effect in therapeutic use     NO DEMEROL    Hyperlipidemia     Hypertension     Internal derangement of right knee 2/13/2017    Joint pain 11/12/2014    Kidney stones     Knee pain 2/13/2017    Psoriasis 1983    Right knee pain 11/16/2015    Stroke     retinal stroke       Past Surgical History:   Procedure Laterality Date    APPENDECTOMY      HYSTERECTOMY      partial    JOINT REPLACEMENT Bilateral     Knees    KNEE ARTHROSCOPY Right 8/27/2015    Dr Roberts    lithtotrispy      LUMBAR SPINE SURGERY  10/2014    L4-L5 fusion       Time Tracking:     OT Date of Treatment: 04/05/22  OT Start Time: 1622  OT Stop Time: 1644  OT Total Time (min): 22 min    Billable Minutes:Evaluation 10  Self Care/Home Management 12    Yue Atkinson OT  4/5/2022

## 2022-04-05 NOTE — ANESTHESIA PROCEDURE NOTES
Left IPACK single shot    Patient location during procedure: pre-op   Block not for primary anesthetic.  Reason for block: at surgeon's request and post-op pain management   Post-op Pain Location: Left knee pain   Start time: 4/5/2022 8:55 AM  Timeout: 4/5/2022 8:40 AM   End time: 4/5/2022 8:57 AM    Staffing  Authorizing Provider: Joe Thomas MD  Performing Provider: Joe Thomas MD    Preanesthetic Checklist  Completed: patient identified, IV checked, site marked, risks and benefits discussed, surgical consent, monitors and equipment checked, pre-op evaluation and timeout performed  Peripheral Block  Patient position: supine  Prep: ChloraPrep  Patient monitoring: heart rate, cardiac monitor, continuous pulse ox, continuous capnometry and frequent blood pressure checks  Block type: popliteal  Laterality: left  Injection technique: single shot  Needle  Needle type: Stimuplex   Needle gauge: 21 G  Needle length: 4 in  Needle localization: anatomical landmarks and ultrasound guidance   -ultrasound image captured on disc.  Assessment  Injection assessment: negative aspiration, negative parasthesia and local visualized surrounding nerve  Paresthesia pain: none  Heart rate change: no  Slow fractionated injection: yes  Pain Tolerance: comfortable throughout block and no complaints  Medications:    Medications: ropivacaine (NAROPIN) injection 0.5% - Perineural   10 mL - 4/5/2022 8:57:00 AM    Additional Notes  VSS.  [facility] RN monitoring vitals throughout procedure.  Patient tolerated procedure well.     20 mL ropivacaine 0.25% w/ epi 1:200k

## 2022-04-06 VITALS
SYSTOLIC BLOOD PRESSURE: 133 MMHG | RESPIRATION RATE: 17 BRPM | HEART RATE: 81 BPM | BODY MASS INDEX: 34.93 KG/M2 | WEIGHT: 185 LBS | HEIGHT: 61 IN | OXYGEN SATURATION: 100 % | TEMPERATURE: 99 F | DIASTOLIC BLOOD PRESSURE: 67 MMHG

## 2022-04-06 PROCEDURE — 25000003 PHARM REV CODE 250: Performed by: PHYSICIAN ASSISTANT

## 2022-04-06 PROCEDURE — 25000003 PHARM REV CODE 250: Performed by: ANESTHESIOLOGY

## 2022-04-06 PROCEDURE — 99232 SBSQ HOSP IP/OBS MODERATE 35: CPT | Mod: GC,,, | Performed by: ANESTHESIOLOGY

## 2022-04-06 PROCEDURE — 97530 THERAPEUTIC ACTIVITIES: CPT

## 2022-04-06 PROCEDURE — 99900035 HC TECH TIME PER 15 MIN (STAT)

## 2022-04-06 PROCEDURE — 97116 GAIT TRAINING THERAPY: CPT

## 2022-04-06 PROCEDURE — 99232 PR SUBSEQUENT HOSPITAL CARE,LEVL II: ICD-10-PCS | Mod: GC,,, | Performed by: ANESTHESIOLOGY

## 2022-04-06 PROCEDURE — 94761 N-INVAS EAR/PLS OXIMETRY MLT: CPT

## 2022-04-06 PROCEDURE — 97110 THERAPEUTIC EXERCISES: CPT

## 2022-04-06 PROCEDURE — 97535 SELF CARE MNGMENT TRAINING: CPT

## 2022-04-06 RX ADMIN — ACETAMINOPHEN 1000 MG: 500 TABLET ORAL at 10:04

## 2022-04-06 RX ADMIN — Medication 2 G: at 01:04

## 2022-04-06 RX ADMIN — OXYCODONE 5 MG: 5 TABLET ORAL at 04:04

## 2022-04-06 RX ADMIN — FAMOTIDINE 20 MG: 20 TABLET, FILM COATED ORAL at 10:04

## 2022-04-06 RX ADMIN — ACETAMINOPHEN 1000 MG: 500 TABLET ORAL at 01:04

## 2022-04-06 RX ADMIN — ASPIRIN 81 MG: 81 TABLET, COATED ORAL at 10:04

## 2022-04-06 RX ADMIN — METHOCARBAMOL 750 MG: 750 TABLET ORAL at 10:04

## 2022-04-06 RX ADMIN — SODIUM CHLORIDE: 0.9 INJECTION, SOLUTION INTRAVENOUS at 12:04

## 2022-04-06 RX ADMIN — POLYETHYLENE GLYCOL 3350 17 G: 17 POWDER, FOR SOLUTION ORAL at 10:04

## 2022-04-06 RX ADMIN — OXYCODONE 5 MG: 5 TABLET ORAL at 10:04

## 2022-04-06 RX ADMIN — SENNOSIDES AND DOCUSATE SODIUM 1 TABLET: 50; 8.6 TABLET ORAL at 10:04

## 2022-04-06 NOTE — PLAN OF CARE
[facility]  Discharge Assessment    Primary Care Provider: Francis Rodriguez MD     Discharge Assessment (most recent)     BRIEF DISCHARGE ASSESSMENT - 04/05/22 1510        Discharge Planning    Assessment Type Discharge Planning Brief Assessment     Resource/Environmental Concerns none     Support Systems Spouse/significant other     Equipment Currently Used at Home wheelchair;rollator     Current Living Arrangements home/apartment/condo     Patient/Family Anticipates Transition to home with family     Patient/Family Anticipated Services at Transition rehabilitation services   [facility] PT    DME Needed Upon Discharge  walker, rolling     Discharge Plan A Home with family     Discharge Plan B Home with family               Living Environment:  Patient lives in a [facility] with no steps to enter. Prior to admission, patients level of function was modified independent with rollator for functional mobility. Equipment used at home: wheelchair, rollator. Upon discharge, patient will have assistance from

## 2022-04-06 NOTE — PROGRESS NOTES
"[facility])  Orthopedics  Progress Note    Patient Name: Carleen Kincaid  MRN: [id number]  Admission Date: 4/5/2022  Hospital Length of Stay: 1 days  Attending Provider: Sri Roberts MD  Primary Care Provider: Francis Rodriguez MD  Follow-up For: Procedure(s) (LRB):  REVISION, ARTHROPLASTY, KNEE (Left)    Post-Operative Day: 1 Day Post-Op  Subjective:     Principal Problem:Failed total left knee replacement    Principal Orthopedic Problem: Same    Interval History: Patient seen and examined at bedside.  No acute events overnight.  Pain controlled.       Review of patient's allergies indicates:  No Known Allergies    Current Facility-Administered Medications   Medication    0.9%  NaCl infusion    acetaminophen tablet 1,000 mg    aspirin EC tablet 81 mg    bisacodyL suppository 10 mg    famotidine tablet 20 mg    methocarbamoL tablet 750 mg    naloxone 0.4 mg/mL injection 0.02 mg    ondansetron injection 4 mg    oxyCODONE immediate release tablet 5 mg    oxyCODONE immediate release tablet Tab 10 mg    polyethylene glycol packet 17 g    pregabalin capsule 75 mg    prochlorperazine injection Soln 5 mg    ropivacaine 0.2% [facility] PainPRO Pump infusion 500 ML    senna-docusate 8.6-50 mg per tablet 1 tablet    sodium chloride 0.9% flush 10 mL     Objective:     Vital Signs (Most Recent):  Temp: 98.4 °F (36.9 °C) (04/06/22 0403)  Pulse: 82 (04/06/22 0403)  Resp: 18 (04/06/22 0445)  BP: 102/61 (04/06/22 0403)  SpO2: 97 % (04/06/22 0403) Vital Signs (24h Range):  Temp:  [96.5 °F (35.8 °C)-98.4 °F (36.9 °C)] 98.4 °F (36.9 °C)  Pulse:  [71-87] 82  Resp:  [3-55] 18  SpO2:  [91 %-100 %] 97 %  BP: ()/(49-94) 102/61     Weight: 83.9 kg (185 lb)  Height: 5' 0.5" (153.7 cm)  Body mass index is 35.54 kg/m².      Intake/Output Summary (Last 24 hours) at 4/6/2022 0632  Last data filed at 4/6/2022 0600  Gross per 24 hour   Intake 5938.33 ml   Output 1900 ml   Net 4038.33 ml       Ortho/SPM " Exam    Significant Labs: CBC: No results for input(s): WBC, HGB, HCT, PLT in the last 48 hours.  All pertinent labs within the past 24 hours have been reviewed.    Significant Imaging: X-Ray: I have reviewed all pertinent results/findings and my personal findings are:  Post op revision TKA in good position     Assessment/Plan:     * Failed total left knee replacement  Carleen Kincaid is a 61 y.o. female POD1 s/p L TKA revision      - Weight bearing status: WBAT  - Pain control: Per APS  - Antibiotics: casey op  - DVT Prophylaxis: ASA , SCD's at all times when not ambulating.  - PT/OT  - Dispo: likely dc today                   Jeovany Jauregui MD  Orthopedics  [facility] ([facility])

## 2022-04-06 NOTE — ANESTHESIA POSTPROCEDURE EVALUATION
Anesthesia Post Evaluation    Patient: Carleen Kincaid    Procedure(s) Performed: Procedure(s) (LRB):  REVISION, ARTHROPLASTY, KNEE (Left)    Final Anesthesia Type: general  The surgery is a total joint replacement and the reasoning for not using regional anesthesia is (patient with lumbar hardware and preferred a general anesthetic)    Patient location during evaluation: PACU  Patient participation: Yes- Able to Participate  Level of consciousness: awake and alert  Post-procedure vital signs: reviewed and stable  Pain management: adequate  Airway patency: patent    PONV status at discharge: No PONV  Anesthetic complications: no      Cardiovascular status: blood pressure returned to baseline  Respiratory status: unassisted  Hydration status: euvolemic  Follow-up not needed.          Vitals Value Taken Time   /67 04/06/22 1129   Temp 37.4 °C (99.3 °F) 04/06/22 1129   Pulse 81 04/06/22 1129   Resp 17 04/06/22 1129   SpO2 100 % 04/06/22 1129         Event Time   Out of Recovery 15:56:10         Pain/Wallace Score: Pain Rating Prior to Med Admin: 5 (4/6/2022 10:03 AM)  Pain Rating Post Med Admin: 5 (4/6/2022  5:45 AM)  Wallace Score: 9 (4/5/2022  3:15 PM)

## 2022-04-06 NOTE — ASSESSMENT & PLAN NOTE
Carleen Kincaid is a 61 y.o. female POD1 s/p L TKA revision      - Weight bearing status: WBAT  - Pain control: Per APS  - Antibiotics: casey op  - DVT Prophylaxis: ASA , SCD's at all times when not ambulating.  - PT/OT  - Dispo: likely dc today

## 2022-04-06 NOTE — PLAN OF CARE
Problem: Occupational Therapy  Goal: Occupational Therapy Goal  Description: Goals to be met by: 4/8/22    Patient will increase functional independence with ADLs by performing:    UE Dressing with Modified [place].  LE Dressing with Supervision.  Grooming while standing at sink with Modified [place].  Toileting from toilet with Modified [place] for hygiene and clothing management.   Toilet transfer to toilet with Modified [place].    Outcome: Met    OT goals met for discharge home

## 2022-04-06 NOTE — PROGRESS NOTES
Acute Pain Service and Perioperative Surgical Home Progress Note    HPI  Carleen Kincaid is a 61 y.o., female, status post left total knee arthroplasty.  No acute events overnight.    Interval history      Surgery:  Procedure(s) (LRB):  REVISION, ARTHROPLASTY, KNEE (Left)    Post Op Day #: 1    Catheter type: Perineural Adductor Canal    Infusion type: Ropivacaine 0.2%, with a 7cc automatic bolus every 3 hours, combined with a 5 cc patient controlled bolus available [id number]    Problem List:    Active Hospital Problems    Diagnosis  POA    *Failed total left knee replacement [T84.459C]  Not Applicable      Resolved Hospital Problems   No resolved problems to display.       Subjective:       General appearance of alert, oriented, no complaints   Pain with rest: 2    Numbers   Pain with movement: 2    Numbers   Side Effects    1. Pruritis No    2. Nausea No    3. Motor Blockade Yes, 0=Ability to raise lower extremities off bed    4. Sedation Yes, 1=awake and alert    Schedule Medications:    acetaminophen  1,000 mg Oral Q6H    aspirin  81 mg Oral BID    famotidine  20 mg Oral BID    methocarbamoL  750 mg Oral TID    polyethylene glycol  17 g Oral Daily    pregabalin  75 mg Oral QHS    senna-docusate 8.6-50 mg  1 tablet Oral BID        Continuous Infusions:   sodium chloride 0.9% 150 mL/hr at 04/06/22 0032    ropivacaine (PF) 2 mg/ml (0.2%)          PRN Medications:  bisacodyL, naloxone, ondansetron, oxyCODONE, oxyCODONE, prochlorperazine, sodium chloride 0.9%       Antibiotics:  Antibiotics (From admission, onward)            None             Objective:     Catheter site clean, dry, intact          Vital Signs (Most Recent):  Temp: 98.4 °F (36.9 °C) (04/06/22 0403)  Pulse: 82 (04/06/22 0403)  Resp: 18 (04/06/22 0445)  BP: 102/61 (04/06/22 0403)  SpO2: 97 % (04/06/22 0403) Vital Signs Range (Last 24H):  Temp:  [96.5 °F (35.8 °C)-98.4 °F (36.9 °C)]   Pulse:  [71-87]   Resp:  [3-55]   BP: ()/(49-94)    SpO2:  [91 %-100 %]          I & O (Last 24H):    Intake/Output Summary (Last 24 hours) at 4/6/2022 0529  Last data filed at 4/6/2022 0445  Gross per 24 hour   Intake 3838.33 ml   Output 1900 ml   Net 1938.33 ml       Physical Exam:    GA: Alert, comfortable, no acute distress.   Pulmonary: Clear to auscultation A/P/L. No wheezing, crackles, or rhonchi.  Cardiac: RRR S1 & S2 w/o rubs/murmurs/gallops.   Abdominal:Bowel sounds present. No tenderness to palpation or distension. No appreciable hepatosplenomegaly.   Skin: No jaundice, rashes, or visible lesions.  M/S: DF/PF/EHL         Laboratory:  CBC: No results for input(s): WBC, RBC, HGB, HCT, PLT, MCV, MCH, MCHC in the last 72 hours.    BMP: No results for input(s): NA, K, CO2, CL, BUN, CREATININE, GLU, MG, PHOS, CALCIUM in the last 72 hours.    No results for input(s): PT, INR, PROTIME, APTT in the last 72 hours.      Anticoagulant dose Asa 81mg at 2053.    Assessment:         Pain control adequate    Plan:     1) Pain:    Adductor canal perineural catheter in place and infusing. Good level. Multimodal pain regimen with acetaminophen, Celebrex, Lyrica, and prn oxycodone given  Will continue to monitor. Plan to discharge with Nimbus pain pump on POD #1.   2) HTN:  Well controled overnight.    3) FEN/GI: Tolerating a regular diet   4) Dispo: Pt working well with PT/OT. Case management and SW for placement. Plan for discharge POD #1.        Evaluator Mica Tidwell NP

## 2022-04-06 NOTE — NURSING
[facility] Pump teaching done w/patient & patients spouse.  Instructed to remove on day 5, Sunday, April 10 at 12 noon or when pump alarms infusion complete. Demonstrated and explained how to remove catheter.  Pt and pts spouse verbalized understanding.  All questions answered. [facility] Pump contract signed, home care instxn pamphlet, home care supplies provided to patient, placed in discharge folder. Encouraged to contact anesthesia using # on brochure with any questions/concerns re: pain, side effects.. Instructed to call [facility] for any pump related issues. Informed that pt/fmly will receive follow up calls.

## 2022-04-06 NOTE — NURSING
Provided patient with pain scale card. Patient/family educated on the use of pain scale card. Patient/family educated on s/s of side effects of new medications. Patient/family verbalized understanding.  at bedside.

## 2022-04-06 NOTE — PT/OT/SLP PROGRESS
"Occupational Therapy   Treatment/Discharge    Name: Carleen Kincaid  MRN: [id number]  Admitting Diagnosis:  Failed total left knee replacement  1 Day Post-Op    Recommendations:     Discharge Recommendations: home  Discharge Equipment Recommendations:  walker, rolling  Barriers to discharge:  None    Assessment:     Carleen Kincaid is a 61 y.o. female with a medical diagnosis of Failed total left knee replacement.  Patient is s/p left TKA revision. She completed lower body dressing at supervision and further ADLs at modified independence. She is appropriate for discharge home. Performance deficits affecting function are weakness, impaired functional mobilty, gait instability, impaired balance, impaired self care skills, decreased lower extremity function, decreased ROM.     Rehab Prognosis:  Good; patient would benefit from acute skilled OT services to address these deficits and reach maximum level of function.       Plan:     · DC from OT     Subjective     "I finished PT but having some pain"     Pain/Comfort:  · Pain Rating 1: 8/10  · Location - Side 1: Left  · Location - Orientation 1: generalized  · Location 1: knee  · Pain Addressed 1: Pre-medicate for activity, Reposition, Distraction    Objective:     Communicated with: RN prior to session.  Patient found up in chair with perineural catheter (Nimbus) upon OT entry to room.    General Precautions: Standard, fall   Orthopedic Precautions:LLE weight bearing as tolerated   Braces: N/A  Respiratory Status: Room air     Occupational Performance:     Functional Mobility/Transfers:  · Patient completed Sit <> Stand Transfer with modified independence  with  rolling walker   · Functional Mobility: patient ambulated to/from bathroom with use of rolling walker at supervision     Activities of Daily Living:  · Grooming: modified independence standing at sink to brush teeth   · Upper Body Dressing: modified independence sitting in chair to don overhead shirt   · Lower " Body Dressing: supervision sitting in chair to don underwear/pants, doff/bibiana socks and don shoes       [facility] 6 Click ADL: 23    Treatment & Education:  -Patient educated to dress surgical side first and further dressing techniques s/p surgery   Patient educated on hand placement for transfers   -Patient educated on rolling walker placement for ADLs  -Patient educated on proper foot wear s/p surgery   -Patient educated on car transfers s/p surgery  -Patient educated on [place] use  -Patient educated on role OT and plan of care s/p surgery at [facility], white board updated as needed     Patient left up in chair with call button in reach, RN notified,  present and Nimbus intactEducation:      GOALS:   Multidisciplinary Problems     Occupational Therapy Goals     Not on file          Multidisciplinary Problems (Resolved)        Problem: Occupational Therapy    Goal Priority Disciplines Outcome Interventions   Occupational Therapy Goal   (Resolved)     OT, PT/OT Met    Description: Goals to be met by: 4/8/22    Patient will increase functional independence with ADLs by performing:    UE Dressing with Modified [place].  LE Dressing with Supervision.  Grooming while standing at sink with Modified [place].  Toileting from toilet with Modified [place] for hygiene and clothing management.   Toilet transfer to toilet with Modified [place].                     Time Tracking:     OT Date of Treatment: 04/06/22  OT Start Time: 0952  OT Stop Time: 1015  OT Total Time (min): 23 min    Billable Minutes:Self Care/Home Management 23               4/6/2022

## 2022-04-06 NOTE — DISCHARGE SUMMARY
"[facility] ([facility])  Orthopedics  Discharge Summary      Patient Name: Carleen Kincaid  MRN: [id number]  Admission Date: 4/5/2022  Hospital Length of Stay: 1 days  Discharge Date and Time: 4/6/2022 11:35 AM  Attending Physician: No att. providers found   Discharging Provider: Jeovany Jauregui MD  Primary Care Provider: Francis Rodrigeuz MD    HPI: See H&P    Procedure(s) (LRB):  REVISION, ARTHROPLASTY, KNEE (Left)      Hospital Course: Patient presented for above procedure.  Tolerated it well and was discharged home POD1 after voiding, tolerating diet, ambulating, pain controlled.  Patient was informed about signs and symptoms of compartment syndrome and if any of these should present then he should immediately call us back and come to the ED.  Discharge instructions, follow-up appointment, and med rec are below.          Significant Diagnostic Studies: Labs: CBC No results for input(s): WBC, HGB, HCT, PLT in the last 48 hours.    Pending Diagnostic Studies:     Procedure Component Value Units Date/Time    Specimen to Pathology, Surgery Orthopedics [[id number]] Collected: 04/05/22 1142    Order Status: Sent Lab Status: In process Updated: 04/05/22 1327        Final Active Diagnoses:    Diagnosis Date Noted POA    PRINCIPAL PROBLEM:  Failed total left knee replacement [T84.093A] 03/09/2022 Not Applicable      Problems Resolved During this Admission:      Discharged Condition: good    Disposition: Home or Self Care    Follow Up:    Patient Instructions:      WALKER FOR HOME USE     Order Specific Question Answer Comments   Type of Walker: Adult (5'4"-6'6")    With wheels? Yes    Height: 5' 0.5" (1.537 m)    Weight: 83.9 kg (185 lb)    Length of need (1-99 months): 99    Does patient have medical equipment at home? rollator    Does patient have medical equipment at home? wheelchair    Please check all that apply: Patient's condition impairs ambulation.      Activity as tolerated     Sponge bath only until " clinic visit     Keep surgical extremity elevated when not ambulating     Lifting restrictions   Order Comments: No strenuous exercise or lifting of > 10 lbs     Weight bearing as tolerated     No driving, operating heavy equipment or signing legal documents while taking pain medication     Leave dressing on - Keep it clean, dry, and intact until clinic visit   Order Comments: Do not remove surgical dressing for 2 weeks post-op. This will be done only by MD at initial post-op visit. If dressing is completely saturated, replace with identical dressing - silver-impregnated hydrocolloid dressing.     Do not get dressings wet. Do not shower.     If dressing continues to be saturated or there are signs of infection, please call Ortho Clinic [phone] for further instructions and to make appt to be seen.     On POD1 remove ace wrap and cotton padding. Leave Aquacel bandage on until 2  week post op visit in clinic     Call MD for:  temperature >100.4     Call MD for:  persistent nausea and vomiting     Call MD for:  severe uncontrolled pain     Call MD for:  difficulty breathing, headache or visual disturbances     Call MD for:  redness, tenderness, or signs of infection (pain, swelling, redness, odor or green/yellow discharge around incision site)     Call MD for:  hives     Call MD for:  persistent dizziness or light-headedness     Call MD for:  extreme fatigue     Medications:  Reconciled Home Medications:      Medication List      CHANGE how you take these medications    clobetasoL 0.05 % external solution  Commonly known as: TEMOVATE  Use on scalp one - two times daily as needed for scaling or itching  What changed: Another medication with the same name was removed. Continue taking this medication, and follow the directions you see here.        CONTINUE taking these medications    ascorbic acid (vitamin C) 500 MG tablet  Commonly known as: VITAMIN C  Take 500 mg by mouth once daily.     aspirin 325 MG EC  tablet  Commonly known as: ECOTRIN  Take 1 tablet (325 mg total) by mouth once daily.     calcipotriene 0.005 % sclp soln  aaa qd- bid prn flare     calcipotriene-betamethasone external suspension  Commonly known as: TACLONEX SCALP  Apply topically once daily.     celecoxib 200 MG capsule  Commonly known as: CeleBREX  Take 1 capsule (200 mg total) by mouth 2 (two) times daily.     cholecalciferol (vitamin D3) 25 mcg (1,000 unit) capsule  Commonly known as: VITAMIN D3  Take 1,000 Units by mouth once daily.     ergocalciferol 50,000 unit Cap  Commonly known as: ERGOCALCIFEROL     HYDROcodone-acetaminophen  mg per tablet  Commonly known as: NORCO  Take 1 tablet by mouth every 6 (six) hours as needed for Pain.     lisinopriL 20 MG tablet  Commonly known as: PRINIVIL,ZESTRIL  Take 20 mg by mouth once daily.     methocarbamoL 500 MG Tab  Commonly known as: ROBAXIN  Take 1 tablet (500 mg total) by mouth 3 (three) times daily. for 10 days     multivitamin per tablet  Commonly known as: THERAGRAN  Take 1 tablet by mouth once daily.     promethazine 25 MG tablet  Commonly known as: PHENERGAN  Take 1 tablet (25 mg total) by mouth every 6 (six) hours as needed for Nausea.     rosuvastatin 10 MG tablet  Commonly known as: CRESTOR  Take 10 mg by mouth once daily.     sertraline 100 MG tablet  Commonly known as: ZOLOFT  Take 100 mg by mouth once daily.     sumatriptan 100 MG tablet  Commonly known as: IMITREX  Take 100 mg by mouth.     traZODone 50 MG tablet  Commonly known as: DESYREL  Take 50 mg by mouth nightly as needed.     TYLENOL ORAL  Take by mouth.     VITAMIN B-12 ORAL  Take by mouth.        STOP taking these medications    clindamycin 1 % external solution  Commonly known as: CLEOCIN T     COSENTYX PEN (2 PENS) 150 mg/mL Pnij  Generic drug: secukinumab     diclofenac 75 MG EC tablet  Commonly known as: VOLTAREN     HUMIRA PEN Pnkt injection  Generic drug: adalimumab     mupirocin 2 % ointment  Commonly known as:  BACTROBAN     ondansetron 4 MG Tbdl  Commonly known as: ZOFRAN-ODT            Jeovany Jauregui MD  Orthopedics  [place] [facility])

## 2022-04-06 NOTE — PLAN OF CARE
Plan of care reviewed with patient; verbalized understanding.   Medications reviewed and administered as ordered.  Rounding for safety and patient care per policy.   Safety precautions maintained. Patient working appropriately with PT/OT.  DME at bedside.  Call light within reach, bed wheels locked, bed in lowest position, side rails ^x2, safety maintained. NADN, Will continue monitor.

## 2022-04-06 NOTE — PLAN OF CARE
Problem: Adult Inpatient Plan of Care  Goal: Plan of Care Review  Outcome: Ongoing, Progressing     Problem: Pain Acute  Goal: Acceptable Pain Control and Functional Ability  Outcome: Ongoing, Progressing     Problem: Bleeding (Knee Arthroplasty)  Goal: Absence of Bleeding  Outcome: Ongoing, Progressing     Problem: Bowel Motility Impaired (Knee Arthroplasty)  Goal: Effective Bowel Elimination  Outcome: Ongoing, Progressing     Problem: Functional Ability Impaired (Knee Arthroplasty)  Goal: Optimal Functional Ability  Outcome: Ongoing, Progressing     Problem: Infection (Knee Arthroplasty)  Goal: Absence of Infection Signs and Symptoms  Outcome: Ongoing, Progressing     Problem: Pain (Knee Arthroplasty)  Goal: Acceptable Pain Control  Outcome: Ongoing, Progressing     Problem: Postoperative Nausea and Vomiting (Knee Arthroplasty)  Goal: Nausea and Vomiting Relief  Outcome: Ongoing, Progressing     Problem: Postoperative Urinary Retention (Knee Arthroplasty)  Goal: Effective Urinary Elimination  Outcome: Ongoing, Progressing     Plan of care reviewed with patient; verbalized understanding.   Medications and possible side effects reviewed and administered as ordered. Voiding without difficulty. Purposeful rounding completed.  Safety precautions maintained. Patient working appropriately with PT/OT.  Call light within reach, bed wheels locked, bed in lowest position, side rails up x2, safety maintained.  slept at bedside.

## 2022-04-06 NOTE — PT/OT/SLP PROGRESS
"Physical Therapy Treatment and Discharge     Patient Name:  Carleen Kincaid   MRN:  [id number]    Recommendations:     Discharge Recommendations:  outpatient PT   Discharge Equipment Recommendations: walker, rolling   Barriers to discharge: None    Assessment:     Carleen Kincaid is a 61 y.o. female admitted with a medical diagnosis of Failed total left knee replacement. She presents with the following impairments/functional limitations:  weakness, impaired functional mobilty, gait instability, impaired balance, decreased lower extremity function, pain, decreased ROM, impaired skin, orthopedic precautions. Patient tolerated PT session well. Patient ambulated 150ft with RW and supervision. No LOB or SOB noted. Patient ascended/descended 6" curb step with RW and contact guard assistance. Patient performed L LE therex x10 reps. Patient needs an OPPT appointment on and team is aware. Patient ready to discharge home from PT standpoint.     Rehab Prognosis: Good; patient would benefit from acute skilled PT services to address these deficits and reach maximum level of function.    Recent Surgery: Procedure(s) (LRB):  REVISION, ARTHROPLASTY, KNEE (Left) 1 Day Post-Op    Plan:     During this hospitalization, patient to be seen daily to address the identified rehab impairments via gait training, therapeutic activities, therapeutic exercises and progress toward the following goals:    · Plan of Care Expires:  04/08/22    Subjective     Chief Complaint: Left knee pain.   Patient/Family Comments/goals: To walk without pain.   Pain/Comfort:  · Pain Rating 1:  (yes but did not rate with number)  · Location - Side 1: Left  · Location 1: knee  · Pain Addressed 1: Pre-medicate for activity, Reposition, Distraction      Objective:     Communicated with RN prior to session.  Patient found up in chair with perineural catheter, cryotherapy (Nimbus) upon PT entry to room.  present in room.     General Precautions: Standard, fall " "  Orthopedic Precautions:LLE weight bearing as tolerated   Braces: N/A     Functional Mobility:  · Mat Mobility:     · Supine to Sit: supervision  · Sit to Supine: supervision  · Transfers:     · Sit to Stand:  supervision with rolling walker x1 from bedside chair and x1 from mat   · Gait: Patient ambulated 150ft with Rolling Walker and supervision  using 3-point gait. Patient demonstrated decreased ivan and decreased step length during gait due to pain, decreased ROM, and decreased strength.  · Stairs:  Patient ascended/descended 6" curb step with RW and contact guard assistance.      AM-PAC 6 CLICK MOBILITY  Turning over in bed (including adjusting bedclothes, sheets and blankets)?: 4  Sitting down on and standing up from a chair with arms (e.g., wheelchair, bedside commode, etc.): 4  Moving from lying on back to sitting on the side of the bed?: 4  Moving to and from a bed to a chair (including a wheelchair)?: 4  Need to walk in hospital room?: 4  Climbing 3-5 steps with a railing?: 3  Basic Mobility Total Score: 23       Therapeutic Activities and Exercises:  Patient educated in and performed L LE exercises x10 reps for ankle pumps, quad set, glute set, SAQ over bolster, heel slides, hip abd/add, SLR, and LAQ.    Patient and  educated in:  -PT role and POC  -safety with transfers including hand placement  -gait sequencing and RW management  -OOB activity to maximize recovery including ambulating at home to prevent DVT   -car transfer  -curb training  -HEP for therex at home with handout provided       Patient left up in chair with all lines intact, call button in reach, RN notified, and  present.    GOALS:   Multidisciplinary Problems     Physical Therapy Goals        Problem: Physical Therapy    Goal Priority Disciplines Outcome Goal Variances Interventions   Physical Therapy Goal     PT, PT/OT Ongoing, Progressing     Description: Goals to be met by: 4/8/2022    Patient will increase functional " independence with mobility by performin. Supine to sit with supervision  2. Sit to stand transfer with Supervision  3. Gait x200 feet with Supervision using Rolling Walker  4. Ascend/Descend 1 curb step with Stand by assistance using Rolling Walker  5. Lower extremity exercise program x30 reps per handout, with supervision                         Time Tracking:     PT Received On: 22  PT Start Time: 858     PT Stop Time: 936  PT Total Time (min): 38 min     Billable Minutes: Gait Training 15, Therapeutic Activity 8, and Therapeutic Exercise 15    Treatment Type: Treatment  PT/PTA: PT       2022

## 2022-04-07 ENCOUNTER — OFFICE VISIT (OUTPATIENT)
Dept: SPORTS MEDICINE | Facility: CLINIC | Age: 62
End: 2022-04-07
Payer: COMMERCIAL

## 2022-04-07 DIAGNOSIS — Z96.652 S/P REVISION OF TOTAL KNEE, LEFT: Primary | ICD-10-CM

## 2022-04-07 PROCEDURE — 1160F PR REVIEW ALL MEDS BY PRESCRIBER/CLIN PHARMACIST DOCUMENTED: ICD-10-PCS | Mod: CPTII,95,, | Performed by: PHYSICIAN ASSISTANT

## 2022-04-07 PROCEDURE — 1159F MED LIST DOCD IN RCRD: CPT | Mod: CPTII,95,, | Performed by: PHYSICIAN ASSISTANT

## 2022-04-07 PROCEDURE — 99024 PR POST-OP FOLLOW-UP VISIT: ICD-10-PCS | Mod: 95,,, | Performed by: PHYSICIAN ASSISTANT

## 2022-04-07 PROCEDURE — 4010F ACE/ARB THERAPY RXD/TAKEN: CPT | Mod: CPTII,95,, | Performed by: PHYSICIAN ASSISTANT

## 2022-04-07 PROCEDURE — 4010F PR ACE/ARB THEARPY RXD/TAKEN: ICD-10-PCS | Mod: CPTII,95,, | Performed by: PHYSICIAN ASSISTANT

## 2022-04-07 PROCEDURE — 99024 POSTOP FOLLOW-UP VISIT: CPT | Mod: 95,,, | Performed by: PHYSICIAN ASSISTANT

## 2022-04-07 PROCEDURE — 1160F RVW MEDS BY RX/DR IN RCRD: CPT | Mod: CPTII,95,, | Performed by: PHYSICIAN ASSISTANT

## 2022-04-07 PROCEDURE — 1159F PR MEDICATION LIST DOCUMENTED IN MEDICAL RECORD: ICD-10-PCS | Mod: CPTII,95,, | Performed by: PHYSICIAN ASSISTANT

## 2022-04-07 NOTE — PROGRESS NOTES
I called the patient today regarding her surgery with Dr. Sri Roberts. The patient had a left revision TKA on 4/5/22.    Pain Scale: 6 / 10  Taking Norco 10mg every 6 hours prn pain.    Any issues with Fever: No.    Any issues with medications (specifically DVT prophylaxis): No.  Taking ASA 325mg once a day.    Any issues with bowel movements:  Passing peterson: yes                                                                 Urination: yes                                                                 Constipation: No.   No issues with the above.    Completing at home exercises: Yes:     Any concerns regarding their dressing/bandage:  No.    Patient confirmed first OP-PT appointment:  Friday, 4/8/22  at 10:30 at [facility] in [place], [place].    Any other concerns: Yes:    She would like to know when she can resume the Cosentyx for psoriasis post-operatively. Dr. Roberts recommended holding off of this medication for 4 weeks post-op.      The patient was informed that if they have any urgent issues with their bandage, medications or any other health concerns regarding their surgery to call the 24/7 Orthopedic Post-op Hot Line at [phone]. The patient was reminded that if they have any chest pain or shortness of breath to call 911 or go to the ER.    The patient verbalized understanding and does not have any other questions

## 2022-04-07 NOTE — ANESTHESIA POST-OP PAIN MANAGEMENT
Acute Pain Service Progress Note    4/7/2022 1353    Unable to reach patient for [facility] follow up. Voicemail left on patient's cell number encouraging to contact anesthesia at [phone]/ after hours at [phone] or [facility] 24/7 support line at [phone] for any questions or concerns about the nerve block or infusion pump. Will continue to follow up.

## 2022-04-08 ENCOUNTER — SPECIALTY PHARMACY (OUTPATIENT)
Dept: PHARMACY | Facility: CLINIC | Age: 62
End: 2022-04-08
Payer: COMMERCIAL

## 2022-04-08 DIAGNOSIS — L40.0 PSORIASIS VULGARIS: ICD-10-CM

## 2022-04-08 DIAGNOSIS — Z79.899 LONG-TERM USE OF HIGH-RISK MEDICATION: ICD-10-CM

## 2022-04-08 NOTE — TELEPHONE ENCOUNTER
Patient called to check the status of her Cosentyx. Cosentyx was d/c'd in error post operation. Patient understands she is to start Cosentyx two weeks after surgery as long as the wound heals without any complication. Patient had the procedure on 4/4 and plans to start on 4/19. Sending request for active prescriptions.    Cosentyx prior auth submitted via [facility]. Key: [id number]. Routing to GI/Derm team for follow up.

## 2022-04-10 NOTE — ANESTHESIA POST-OP PAIN MANAGEMENT
Acute Pain Service Progress Note    Attempted to call Carleen Kincaid at 12:13 PM on 04/10/2022 regarding the PNC and Nimbus pump and the remind them that the PNC should be removed today.  Voicemail was left encouraging the patient to call with any problems or questions.              Jeovany Tirado MD   Fellow  Regional Anesthesiology and Acute Pain Medicine  [facility]

## 2022-04-10 NOTE — ADDENDUM NOTE
Addendum  created 04/10/22 1213 by Jeovany Tirado MD    Clinical Note Signed, Intraprocedure Event edited

## 2022-04-11 RX ORDER — SECUKINUMAB 150 MG/ML
INJECTION SUBCUTANEOUS
Qty: 10 EACH | Refills: 0 | Status: SHIPPED | OUTPATIENT
Start: 2022-04-11

## 2022-04-11 RX ORDER — SECUKINUMAB 150 MG/ML
INJECTION SUBCUTANEOUS
Qty: 2 EACH | Refills: 6 | Status: SHIPPED | OUTPATIENT
Start: 2022-04-11 | End: 2023-01-17 | Stop reason: SDUPTHER

## 2022-04-11 NOTE — TELEPHONE ENCOUNTER
Cosentyx approved from 4/11/22 - 4/11/23. [facility] out of network. Called [employer] at [phone]. Rep Una MONTGOMERY, member services who verified patient locked into [facility].     Called patient. Patient verified she has filled with [facility] in the past and has their contact information. Obtained copay card information with patient's permission.     ID: [id number]  [facility] [id number]  BIN [id number]  PCN [id number]    No further questions or concerns. Routing Rx. Closing referral.

## 2022-04-12 LAB — FUNGUS SPEC CULT: NORMAL

## 2022-04-13 LAB
FINAL PATHOLOGIC DIAGNOSIS: NORMAL
Lab: NORMAL

## 2022-04-22 ENCOUNTER — HOSPITAL ENCOUNTER (OUTPATIENT)
Dept: RADIOLOGY | Facility: HOSPITAL | Age: 62
Discharge: HOME | End: 2022-04-22
Attending: PHYSICIAN ASSISTANT
Payer: COMMERCIAL

## 2022-04-22 ENCOUNTER — OFFICE VISIT (OUTPATIENT)
Dept: SPORTS MEDICINE | Facility: CLINIC | Age: 62
End: 2022-04-22
Payer: COMMERCIAL

## 2022-04-22 VITALS
HEIGHT: 61 IN | WEIGHT: 185 LBS | HEART RATE: 90 BPM | SYSTOLIC BLOOD PRESSURE: 154 MMHG | RESPIRATION RATE: 14 BRPM | DIASTOLIC BLOOD PRESSURE: 94 MMHG | BODY MASS INDEX: 34.93 KG/M2

## 2022-04-22 DIAGNOSIS — Z96.652 S/P REVISION OF TOTAL KNEE, LEFT: ICD-10-CM

## 2022-04-22 DIAGNOSIS — T84.84XA PAINFUL ORTHOPAEDIC HARDWARE: ICD-10-CM

## 2022-04-22 DIAGNOSIS — T84.093D FAILED TOTAL KNEE, LEFT, SUBSEQUENT ENCOUNTER: ICD-10-CM

## 2022-04-22 DIAGNOSIS — R60.0 EDEMA OF LEFT LOWER EXTREMITY: ICD-10-CM

## 2022-04-22 DIAGNOSIS — Z96.652 S/P REVISION OF TOTAL KNEE, LEFT: Primary | ICD-10-CM

## 2022-04-22 PROCEDURE — 1159F PR MEDICATION LIST DOCUMENTED IN MEDICAL RECORD: ICD-10-PCS | Mod: CPTII,S$GLB,, | Performed by: PHYSICIAN ASSISTANT

## 2022-04-22 PROCEDURE — 3077F PR MOST RECENT SYSTOLIC BLOOD PRESSURE >= 140 MM HG: ICD-10-PCS | Mod: CPTII,S$GLB,, | Performed by: PHYSICIAN ASSISTANT

## 2022-04-22 PROCEDURE — 4010F ACE/ARB THERAPY RXD/TAKEN: CPT | Mod: CPTII,S$GLB,, | Performed by: PHYSICIAN ASSISTANT

## 2022-04-22 PROCEDURE — 99024 PR POST-OP FOLLOW-UP VISIT: ICD-10-PCS | Mod: S$GLB,,, | Performed by: PHYSICIAN ASSISTANT

## 2022-04-22 PROCEDURE — 1160F RVW MEDS BY RX/DR IN RCRD: CPT | Mod: CPTII,S$GLB,, | Performed by: PHYSICIAN ASSISTANT

## 2022-04-22 PROCEDURE — 73560 XR KNEE 1 OR 2 VIEW LEFT: ICD-10-PCS | Mod: 26,LT,, | Performed by: RADIOLOGY

## 2022-04-22 PROCEDURE — 4010F PR ACE/ARB THEARPY RXD/TAKEN: ICD-10-PCS | Mod: CPTII,S$GLB,, | Performed by: PHYSICIAN ASSISTANT

## 2022-04-22 PROCEDURE — 99024 POSTOP FOLLOW-UP VISIT: CPT | Mod: S$GLB,,, | Performed by: PHYSICIAN ASSISTANT

## 2022-04-22 PROCEDURE — 73560 X-RAY EXAM OF KNEE 1 OR 2: CPT | Mod: 26,LT,, | Performed by: RADIOLOGY

## 2022-04-22 PROCEDURE — 1159F MED LIST DOCD IN RCRD: CPT | Mod: CPTII,S$GLB,, | Performed by: PHYSICIAN ASSISTANT

## 2022-04-22 PROCEDURE — 73560 X-RAY EXAM OF KNEE 1 OR 2: CPT | Mod: TC,LT

## 2022-04-22 PROCEDURE — 99999 PR PBB SHADOW E&M-EST. PATIENT-LVL V: ICD-10-PCS | Mod: PBBFAC,,, | Performed by: PHYSICIAN ASSISTANT

## 2022-04-22 PROCEDURE — 99999 PR PBB SHADOW E&M-EST. PATIENT-LVL V: CPT | Mod: PBBFAC,,, | Performed by: PHYSICIAN ASSISTANT

## 2022-04-22 PROCEDURE — 3080F DIAST BP >= 90 MM HG: CPT | Mod: CPTII,S$GLB,, | Performed by: PHYSICIAN ASSISTANT

## 2022-04-22 PROCEDURE — 3008F BODY MASS INDEX DOCD: CPT | Mod: CPTII,S$GLB,, | Performed by: PHYSICIAN ASSISTANT

## 2022-04-22 PROCEDURE — 3008F PR BODY MASS INDEX (BMI) DOCUMENTED: ICD-10-PCS | Mod: CPTII,S$GLB,, | Performed by: PHYSICIAN ASSISTANT

## 2022-04-22 PROCEDURE — 3080F PR MOST RECENT DIASTOLIC BLOOD PRESSURE >= 90 MM HG: ICD-10-PCS | Mod: CPTII,S$GLB,, | Performed by: PHYSICIAN ASSISTANT

## 2022-04-22 PROCEDURE — 3077F SYST BP >= 140 MM HG: CPT | Mod: CPTII,S$GLB,, | Performed by: PHYSICIAN ASSISTANT

## 2022-04-22 PROCEDURE — 1160F PR REVIEW ALL MEDS BY PRESCRIBER/CLIN PHARMACIST DOCUMENTED: ICD-10-PCS | Mod: CPTII,S$GLB,, | Performed by: PHYSICIAN ASSISTANT

## 2022-04-22 RX ORDER — METHOCARBAMOL 500 MG/1
500 TABLET, FILM COATED ORAL 3 TIMES DAILY PRN
Qty: 40 TABLET | Refills: 0 | Status: SHIPPED | OUTPATIENT
Start: 2022-04-22

## 2022-04-22 RX ORDER — HYDROCODONE BITARTRATE AND ACETAMINOPHEN 10; 325 MG/1; MG/1
1 TABLET ORAL EVERY 6 HOURS PRN
Qty: 28 TABLET | Refills: 0 | Status: ON HOLD | OUTPATIENT
Start: 2022-04-22 | End: 2022-07-13

## 2022-04-26 ENCOUNTER — PATIENT MESSAGE (OUTPATIENT)
Dept: SPORTS MEDICINE | Facility: CLINIC | Age: 62
End: 2022-04-26
Payer: COMMERCIAL

## 2022-04-28 LAB
ACID FAST MOD KINY STN SPEC: NORMAL
MYCOBACTERIUM SPEC QL CULT: NORMAL

## 2022-05-16 ENCOUNTER — PATIENT MESSAGE (OUTPATIENT)
Dept: SPORTS MEDICINE | Facility: CLINIC | Age: 62
End: 2022-05-16
Payer: COMMERCIAL

## 2022-05-16 NOTE — PROGRESS NOTES
Subjective:          Chief Complaint: Carleen Kincaid is a 61 y.o. female who had concerns including Post-op Evaluation of the Left Knee.    Carleen Kincaid presents in clinic today for a 7 week post-op evaluation s/p procedure below.   Pt has no complaints at this time. She is doing PT at [facility] and progressing as scheduled. She is no longer taking pain medication. Needs a refill on Robaxin.    DATE OF PROCEDURE:  04/05/22     ATTENDING SURGEON:  Sri Roberts M.D.     CO-SURGEON:  Jeovany Jauregui M.D. - Resident     FIRST ASSISTANT:  Светлана Agarwal PA-C-assistant.        PREOPERATIVE DIAGNOSIS:  Failed Left total knee replacement.     POSTOPERATIVE DIAGNOSIS:  Failed Left total knee replacement.     PROCEDURES PERFORMED:  Left complex revision total knee replacement.    DATE OF PROCEDURE:  12/12/2017     ATTENDING SURGEON:  Sri Roberts M.D.     CO-SURGEON:  Santiago Qiu M.D.     The complexity of the case required co-surgeon to help with the case. A separate operative report will be dictated by Dr Sri Roberts.     FIRST ASSISTANT:  SMA Sreekanth-assistant.     SECOND ASSISTANT:  Jossue Irene M.D.     PREOPERATIVE DIAGNOSIS:  Failed right total knee replacement.     POSTOPERATIVE DIAGNOSIS:  Failed right total knee replacement.     PROCEDURES PERFORMED:  Right complex revision total knee replacement.      Review of Systems   Constitutional: Negative for chills and fever.   HENT: Negative for congestion and sore throat.    Eyes: Negative for discharge and double vision.   Cardiovascular: Negative for chest pain, palpitations and syncope.   Respiratory: Negative for cough and shortness of breath.    Endocrine: Negative for cold intolerance and heat intolerance.   Skin: Negative for dry skin and rash.   Musculoskeletal: Positive for joint pain and joint swelling. Negative for arthritis, back pain, falls, gout, muscle cramps, muscle weakness, myalgias, neck pain and stiffness.    Gastrointestinal: Negative for abdominal pain, nausea and vomiting.   Neurological: Negative for focal weakness, numbness and paresthesias.                   Objective:        General: Carleen is well-developed, well-nourished, appears stated age, in no acute distress, alert and oriented to time, place and person.     General    Nursing note and vitals reviewed.  Constitutional: She is oriented to person, place, and time. She appears well-developed and well-nourished. No distress.   Eyes: Conjunctivae and EOM are normal. Pupils are equal, round, and reactive to light.   Neck: Neck supple. No JVD present.   Cardiovascular: Normal heart sounds and intact distal pulses.    No murmur heard.  Pulmonary/Chest: Effort normal and breath sounds normal. No respiratory distress.   Abdominal: Soft. Bowel sounds are normal. She exhibits no distension. There is no abdominal tenderness.   Neurological: She is alert and oriented to person, place, and time. Coordination normal.   Psychiatric: She has a normal mood and affect. Her behavior is normal. Judgment and thought content normal.     General Musculoskeletal Exam   Gait: antalgic       Right Knee Exam     Inspection   Erythema: absent  Scars: absent  Swelling: absent  Effusion: absent  Deformity: absent  Bruising: absent    Range of Motion   Extension: 0   Flexion: 130     Other   Sensation: normal    Left Knee Exam     Inspection   Erythema: absent  Scars: present  Swelling: present  Effusion: absent  Deformity: absent  Bruising: absent    Tenderness   The patient tender to palpation of the condyle.    Range of Motion   Extension: 0   Flexion: 110     Other   Sensation: normal    Comments:  Incision clean/dry/intact  No sign of infection  Mild swelling  Compartments soft  Neurovascular status intact in extremity      Muscle Strength   Right Lower Extremity   Hip Abduction: 5/5   Quadriceps:  5/5   Hamstrin/5   Left Lower Extremity   Hip Abduction: 5/5   Quadriceps:  4/5    Hamstrin/5     Vascular Exam     Right Pulses  Dorsalis Pedis:      2+  Posterior Tibial:      2+        Left Pulses  Dorsalis Pedis:      2+  Posterior Tibial:      2+        Edema  Right Lower Leg: absent  Left Lower Leg: absent      Radiographic Findings: 22  Bilateral knees:  FINDINGS:  Knee ortho bilateral with flexion.     Four views bilateral knees.     Right: There is a TKA in place good alignment no complication.     Left: The TKA in place good alignment no complication.        Assessment:       Encounter Diagnoses   Name Primary?    S/P revision of total knee, left Yes    Muscle spasm of left lower extremity           Plan:       1. IKDC, SF-12 and KOOS was not filled out today in clinic.     RTC in 6 weeks with Dr. Sri Roberts or Светлана Agarwal PA-C for 3 months post-op. Patient will fill out IKDC, SF-12 and KOOS on return.    2. Medications: Refills of the following Rx were sent to patients preferred Pharmacy:  Continue voltaren 75mg BID. Refilled Robaxin 500mg to take every 8 hours prn muscle spasms    3. Physical Therapy: Continue/Begin: Continue at [facility]. Follow PT per protocol                             [facility] patient questionnaires have been collected today.

## 2022-05-18 ENCOUNTER — HOSPITAL ENCOUNTER (OUTPATIENT)
Dept: RADIOLOGY | Facility: HOSPITAL | Age: 62
Discharge: HOME | End: 2022-05-18
Attending: PHYSICIAN ASSISTANT
Payer: COMMERCIAL

## 2022-05-18 ENCOUNTER — HOSPITAL ENCOUNTER (OUTPATIENT)
Dept: RADIOLOGY | Facility: HOSPITAL | Age: 62
Discharge: HOME | End: 2022-05-18
Attending: SPECIALIST
Payer: COMMERCIAL

## 2022-05-18 ENCOUNTER — OFFICE VISIT (OUTPATIENT)
Dept: NEUROSURGERY | Facility: CLINIC | Age: 62
End: 2022-05-18
Payer: COMMERCIAL

## 2022-05-18 VITALS
BODY MASS INDEX: 34.96 KG/M2 | SYSTOLIC BLOOD PRESSURE: 130 MMHG | HEART RATE: 86 BPM | DIASTOLIC BLOOD PRESSURE: 83 MMHG | WEIGHT: 185 LBS

## 2022-05-18 DIAGNOSIS — M54.16 LUMBAR RADICULOPATHY: ICD-10-CM

## 2022-05-18 DIAGNOSIS — N20.0 KIDNEY STONE: ICD-10-CM

## 2022-05-18 DIAGNOSIS — M54.16 LUMBAR RADICULOPATHY: Primary | ICD-10-CM

## 2022-05-18 DIAGNOSIS — M54.50 LOW BACK PAIN, UNSPECIFIED BACK PAIN LATERALITY, UNSPECIFIED CHRONICITY, UNSPECIFIED WHETHER SCIATICA PRESENT: ICD-10-CM

## 2022-05-18 DIAGNOSIS — N20.0 KIDNEY STONE: Primary | ICD-10-CM

## 2022-05-18 DIAGNOSIS — Z98.1 S/P LUMBAR FUSION: ICD-10-CM

## 2022-05-18 PROCEDURE — 74018 RADEX ABDOMEN 1 VIEW: CPT | Mod: 26,,, | Performed by: RADIOLOGY

## 2022-05-18 PROCEDURE — 1160F PR REVIEW ALL MEDS BY PRESCRIBER/CLIN PHARMACIST DOCUMENTED: ICD-10-PCS | Mod: CPTII,S$GLB,, | Performed by: NURSE PRACTITIONER

## 2022-05-18 PROCEDURE — 99999 PR PBB SHADOW E&M-EST. PATIENT-LVL IV: ICD-10-PCS | Mod: PBBFAC,,, | Performed by: NURSE PRACTITIONER

## 2022-05-18 PROCEDURE — 3075F SYST BP GE 130 - 139MM HG: CPT | Mod: CPTII,S$GLB,, | Performed by: NURSE PRACTITIONER

## 2022-05-18 PROCEDURE — 74018 RADEX ABDOMEN 1 VIEW: CPT | Mod: TC

## 2022-05-18 PROCEDURE — 1159F PR MEDICATION LIST DOCUMENTED IN MEDICAL RECORD: ICD-10-PCS | Mod: CPTII,S$GLB,, | Performed by: NURSE PRACTITIONER

## 2022-05-18 PROCEDURE — 99213 OFFICE O/P EST LOW 20 MIN: CPT | Mod: S$GLB,,, | Performed by: NURSE PRACTITIONER

## 2022-05-18 PROCEDURE — 3075F PR MOST RECENT SYSTOLIC BLOOD PRESS GE 130-139MM HG: ICD-10-PCS | Mod: CPTII,S$GLB,, | Performed by: NURSE PRACTITIONER

## 2022-05-18 PROCEDURE — 99213 PR OFFICE/OUTPT VISIT, EST, LEVL III, 20-29 MIN: ICD-10-PCS | Mod: S$GLB,,, | Performed by: NURSE PRACTITIONER

## 2022-05-18 PROCEDURE — 72148 MRI LUMBAR SPINE W/O DYE: CPT | Mod: 26,,, | Performed by: RADIOLOGY

## 2022-05-18 PROCEDURE — 3008F PR BODY MASS INDEX (BMI) DOCUMENTED: ICD-10-PCS | Mod: CPTII,S$GLB,, | Performed by: NURSE PRACTITIONER

## 2022-05-18 PROCEDURE — 1159F MED LIST DOCD IN RCRD: CPT | Mod: CPTII,S$GLB,, | Performed by: NURSE PRACTITIONER

## 2022-05-18 PROCEDURE — 1160F RVW MEDS BY RX/DR IN RCRD: CPT | Mod: CPTII,S$GLB,, | Performed by: NURSE PRACTITIONER

## 2022-05-18 PROCEDURE — 3008F BODY MASS INDEX DOCD: CPT | Mod: CPTII,S$GLB,, | Performed by: NURSE PRACTITIONER

## 2022-05-18 PROCEDURE — 3079F PR MOST RECENT DIASTOLIC BLOOD PRESSURE 80-89 MM HG: ICD-10-PCS | Mod: CPTII,S$GLB,, | Performed by: NURSE PRACTITIONER

## 2022-05-18 PROCEDURE — 99999 PR PBB SHADOW E&M-EST. PATIENT-LVL IV: CPT | Mod: PBBFAC,,, | Performed by: NURSE PRACTITIONER

## 2022-05-18 PROCEDURE — 3079F DIAST BP 80-89 MM HG: CPT | Mod: CPTII,S$GLB,, | Performed by: NURSE PRACTITIONER

## 2022-05-18 PROCEDURE — 72148 MRI LUMBAR SPINE W/O DYE: CPT | Mod: TC

## 2022-05-18 PROCEDURE — 74018 XR KUB: ICD-10-PCS | Mod: 26,,, | Performed by: RADIOLOGY

## 2022-05-18 PROCEDURE — 4010F PR ACE/ARB THEARPY RXD/TAKEN: ICD-10-PCS | Mod: CPTII,S$GLB,, | Performed by: NURSE PRACTITIONER

## 2022-05-18 PROCEDURE — 72148 MRI LUMBAR SPINE WITHOUT CONTRAST: ICD-10-PCS | Mod: 26,,, | Performed by: RADIOLOGY

## 2022-05-18 PROCEDURE — 4010F ACE/ARB THERAPY RXD/TAKEN: CPT | Mod: CPTII,S$GLB,, | Performed by: NURSE PRACTITIONER

## 2022-05-18 RX ORDER — NITROFURANTOIN 25; 75 MG/1; MG/1
100 CAPSULE ORAL 2 TIMES DAILY
Status: ON HOLD | COMMUNITY
Start: 2022-05-15 | End: 2022-07-13

## 2022-05-18 RX ORDER — DICLOFENAC SODIUM 75 MG/1
75 TABLET, DELAYED RELEASE ORAL 2 TIMES DAILY
COMMUNITY
Start: 2022-05-13

## 2022-05-23 ENCOUNTER — OFFICE VISIT (OUTPATIENT)
Dept: SPORTS MEDICINE | Facility: CLINIC | Age: 62
End: 2022-05-23
Payer: COMMERCIAL

## 2022-05-23 ENCOUNTER — HOSPITAL ENCOUNTER (OUTPATIENT)
Dept: RADIOLOGY | Facility: HOSPITAL | Age: 62
Discharge: HOME | End: 2022-05-23
Attending: ORTHOPAEDIC SURGERY
Payer: COMMERCIAL

## 2022-05-23 VITALS
BODY MASS INDEX: 34.74 KG/M2 | HEIGHT: 61 IN | DIASTOLIC BLOOD PRESSURE: 86 MMHG | HEART RATE: 78 BPM | WEIGHT: 184 LBS | SYSTOLIC BLOOD PRESSURE: 126 MMHG

## 2022-05-23 DIAGNOSIS — M25.562 PAIN IN BOTH KNEES, UNSPECIFIED CHRONICITY: ICD-10-CM

## 2022-05-23 DIAGNOSIS — M25.561 PAIN IN BOTH KNEES, UNSPECIFIED CHRONICITY: ICD-10-CM

## 2022-05-23 DIAGNOSIS — Z96.652 S/P REVISION OF TOTAL KNEE, LEFT: Primary | ICD-10-CM

## 2022-05-23 DIAGNOSIS — M62.838 MUSCLE SPASM OF LEFT LOWER EXTREMITY: ICD-10-CM

## 2022-05-23 PROCEDURE — 3074F PR MOST RECENT SYSTOLIC BLOOD PRESSURE < 130 MM HG: ICD-10-PCS | Mod: CPTII,S$GLB,, | Performed by: ORTHOPAEDIC SURGERY

## 2022-05-23 PROCEDURE — 99024 POSTOP FOLLOW-UP VISIT: CPT | Mod: S$GLB,,, | Performed by: ORTHOPAEDIC SURGERY

## 2022-05-23 PROCEDURE — 3008F PR BODY MASS INDEX (BMI) DOCUMENTED: ICD-10-PCS | Mod: CPTII,S$GLB,, | Performed by: ORTHOPAEDIC SURGERY

## 2022-05-23 PROCEDURE — 1160F RVW MEDS BY RX/DR IN RCRD: CPT | Mod: CPTII,S$GLB,, | Performed by: ORTHOPAEDIC SURGERY

## 2022-05-23 PROCEDURE — 73564 X-RAY EXAM KNEE 4 OR MORE: CPT | Mod: TC,50

## 2022-05-23 PROCEDURE — 3074F SYST BP LT 130 MM HG: CPT | Mod: CPTII,S$GLB,, | Performed by: ORTHOPAEDIC SURGERY

## 2022-05-23 PROCEDURE — 73564 X-RAY EXAM KNEE 4 OR MORE: CPT | Mod: 26,,, | Performed by: RADIOLOGY

## 2022-05-23 PROCEDURE — 99999 PR PBB SHADOW E&M-EST. PATIENT-LVL V: CPT | Mod: PBBFAC,,, | Performed by: ORTHOPAEDIC SURGERY

## 2022-05-23 PROCEDURE — 99999 PR PBB SHADOW E&M-EST. PATIENT-LVL V: ICD-10-PCS | Mod: PBBFAC,,, | Performed by: ORTHOPAEDIC SURGERY

## 2022-05-23 PROCEDURE — 1160F PR REVIEW ALL MEDS BY PRESCRIBER/CLIN PHARMACIST DOCUMENTED: ICD-10-PCS | Mod: CPTII,S$GLB,, | Performed by: ORTHOPAEDIC SURGERY

## 2022-05-23 PROCEDURE — 3079F DIAST BP 80-89 MM HG: CPT | Mod: CPTII,S$GLB,, | Performed by: ORTHOPAEDIC SURGERY

## 2022-05-23 PROCEDURE — 3008F BODY MASS INDEX DOCD: CPT | Mod: CPTII,S$GLB,, | Performed by: ORTHOPAEDIC SURGERY

## 2022-05-23 PROCEDURE — 99024 PR POST-OP FOLLOW-UP VISIT: ICD-10-PCS | Mod: S$GLB,,, | Performed by: ORTHOPAEDIC SURGERY

## 2022-05-23 PROCEDURE — 4010F ACE/ARB THERAPY RXD/TAKEN: CPT | Mod: CPTII,S$GLB,, | Performed by: ORTHOPAEDIC SURGERY

## 2022-05-23 PROCEDURE — 1159F MED LIST DOCD IN RCRD: CPT | Mod: CPTII,S$GLB,, | Performed by: ORTHOPAEDIC SURGERY

## 2022-05-23 PROCEDURE — 73564 XR KNEE ORTHO BILAT WITH FLEXION: ICD-10-PCS | Mod: 26,,, | Performed by: RADIOLOGY

## 2022-05-23 PROCEDURE — 4010F PR ACE/ARB THEARPY RXD/TAKEN: ICD-10-PCS | Mod: CPTII,S$GLB,, | Performed by: ORTHOPAEDIC SURGERY

## 2022-05-23 PROCEDURE — 1159F PR MEDICATION LIST DOCUMENTED IN MEDICAL RECORD: ICD-10-PCS | Mod: CPTII,S$GLB,, | Performed by: ORTHOPAEDIC SURGERY

## 2022-05-23 PROCEDURE — 3079F PR MOST RECENT DIASTOLIC BLOOD PRESSURE 80-89 MM HG: ICD-10-PCS | Mod: CPTII,S$GLB,, | Performed by: ORTHOPAEDIC SURGERY

## 2022-05-23 RX ORDER — METHOCARBAMOL 500 MG/1
500 TABLET, FILM COATED ORAL 3 TIMES DAILY
Qty: 90 TABLET | Refills: 1 | Status: SHIPPED | OUTPATIENT
Start: 2022-05-23 | End: 2022-06-22

## 2022-05-25 ENCOUNTER — TELEPHONE (OUTPATIENT)
Dept: PAIN MEDICINE | Facility: CLINIC | Age: 62
End: 2022-05-25
Payer: COMMERCIAL

## 2022-05-25 DIAGNOSIS — M51.36 DDD (DEGENERATIVE DISC DISEASE), LUMBAR: Primary | ICD-10-CM

## 2022-05-25 NOTE — TELEPHONE ENCOUNTER
We anshul canales         ----- Message -----   From: Linda Blankenship LPN   Sent: 2022   7:10 AM CDT   To: Shaka Caldwell MD   Subject: FW: Order for BERE EDMOND                     Ok to schedule?       Kirill    Rosi    ----- Message -----   From: Ac Ritter NP   Sent: 2022   9:59 PM CDT   To: [facility] Pain Management Schedulers   Subject: Order for BERE EDMOND                            Patient Name: BERE EDMOND([id number])   Sex: Female   : 1960        PCP: TY MARX     Center: [facility] CENTRAL BILLING OFFICE       Types of orders made on 2022: Procedure Request      Order Date:2022   Ordering User:AC RITTER [[id number]]   Encounter Provider:Ac Ritter NP [[id number]]   Authorizing Pr   ovider: Ac Ritter NP [[id number]]   Supervising Provider:ANJEL MORSE [[id number]]   Type of Supervision:Collaborating Physician   Department:[facility] NEUROSURGERY 8TH FLOOR[[id number]]      Common Order Information   Procedure -> Epidural Injection (specify level) Cmt: L3-4      Order Specific Information   Order: Procedure Order to Pain Management [Custom: [id number]]  Order #:           [id number]: 1 FUTURE     Priority:    NRoutine  Class: Clinic Performed     Future Order Information       Expires on:2023            Expected by:2022                    Associated Diagnoses       M54.16 Lumbar radiculopathy       Z98.1 S/P lumbar fusion       Facility Name: [facility]               Priority: Routine  Class: Clinic Performed     Future Order Information       Expires on:2023            Expected by:2022                    Associated Diagnoses       M54.16 Lumbar radiculopathy       Z98.1 S/P lumbar fusion       Procedure -> Epidural Injection (specify level) Cmt: L3-4          Facility Name: [facility]

## 2022-05-25 NOTE — PROGRESS NOTES
Neurosurgery  Established Patient    SUBJECTIVE:     History of Present Illness (per Марина Hanna PA-C note): Carleen Kincaid is a 61 y.o. female with past history of neurogenic claudication s/p L4-5 posterior fusion in 2014 who was referred to me by Merrill Godinez PA-C with Orthopedics. She has an extensive orthopedic history, including bilateral knee replacements with most recently failed left knee replacement. She is scheduled for a left knee arthroplasty revision in early April. She is here for complaints of returned lower back pain as well as leg pain that began about 2 years ago. She initially underwent an L4-5 posterior fusion for neurogenic claudication and spondylolisthesis in 2014 by Dr. Richard Sanchez in [place], [place]. Her symptoms improved post operatively. About 2 years ago she had return of her lower back pain and pain that radiates down the posterior aspect of bilateral legs. Pain in the legs is exacerbated by walking for long periods. It is relieved by leaning forward and sitting. She endorses several episodes of loss of control of her bowels and bladder in the past month. She denies any focal weakness or saddle anesthesia. She has not tried physical therapy or pain management     Interval Hx 5/18/22: After the last appointment with Марина Hanna, it was recommended that the patient obtain a MRI to evaluate her chronic back pain. The patient is being seen today in clinic to review the image findings. States that some of her symptoms have improved since her recent left knee arthroplasty. She is able to stand and walk for longer periods. She is doing PT. She does continue to have pain in the low back.     Review of patient's allergies indicates:  No Known Allergies    Current Outpatient Medications   Medication Sig Dispense Refill    acetaminophen (TYLENOL ORAL) Take by mouth.      ascorbic acid, vitamin C, (VITAMIN C) 500 MG tablet Take 500 mg by mouth once daily.      calcipotriene  0.005 % sclp soln aaa qd- bid prn flare 60 mL 6    calcipotriene-betamethasone (TACLONEX SCALP) external suspension Apply topically once daily. 60 g 1    cholecalciferol, vitamin D3, (VITAMIN D3) 25 mcg (1,000 unit) capsule Take 1,000 Units by mouth once daily.      clobetasoL (TEMOVATE) 0.05 % external solution Use on scalp one - two times daily as needed for scaling or itching 60 mL 3    COSENTYX PEN, 2 PENS, 150 mg/mL PnIj Inject 300mg (2 pens) SQ q 4 weeks 2 each 6    COSENTYX PEN, 2 PENS, 150 mg/mL PnIj Inject 300mg SQ qweek x 5 weeks then inject 300mg SQ q 4 weeks 10 each 0    cyanocobalamin, vitamin B-12, (VITAMIN B-12 ORAL) Take by mouth.      ergocalciferol (ERGOCALCIFEROL) 50,000 unit Cap   1    HYDROcodone-acetaminophen (NORCO)  mg per tablet Take 1 tablet by mouth every 6 (six) hours as needed for Pain. 28 tablet 0    lisinopriL (PRINIVIL,ZESTRIL) 20 MG tablet Take 20 mg by mouth once daily.      methocarbamoL (ROBAXIN) 500 MG Tab Take 1 tablet (500 mg total) by mouth 3 (three) times daily as needed (muscle spasms). 40 tablet 0    multivitamin (THERAGRAN) per tablet Take 1 tablet by mouth once daily.      nitrofurantoin, macrocrystal-monohydrate, (MACROBID) 100 MG capsule Take 100 mg by mouth 2 (two) times a day.      rosuvastatin (CRESTOR) 10 MG tablet Take 10 mg by mouth once daily.      sertraline (ZOLOFT) 100 MG tablet Take 100 mg by mouth once daily.      sumatriptan (IMITREX) 100 MG tablet Take 100 mg by mouth.      traZODone (DESYREL) 50 MG tablet Take 50 mg by mouth nightly as needed.      aspirin (ECOTRIN) 325 MG EC tablet Take 1 tablet (325 mg total) by mouth once daily. 42 tablet 0    celecoxib (CELEBREX) 200 MG capsule Take 1 capsule (200 mg total) by mouth 2 (two) times daily. 60 capsule 2    diclofenac (VOLTAREN) 75 MG EC tablet Take 75 mg by mouth 2 (two) times daily.      methocarbamoL (ROBAXIN) 500 MG Tab Take 1 tablet (500 mg total) by mouth 3 (three) times  daily. 90 tablet 1     No current facility-administered medications for this visit.       Past Medical History:   Diagnosis Date    Anxiety     Degenerative disc disease, cervical     Depression     General anesthetics causing adverse effect in therapeutic use     NO DEMEROL    Hyperlipidemia     Hypertension     Internal derangement of right knee 2/13/2017    Joint pain 11/12/2014    Kidney stones     Knee pain 2/13/2017    Psoriasis 1983    Right knee pain 11/16/2015    Stroke     retinal stroke     Past Surgical History:   Procedure Laterality Date    APPENDECTOMY      HYSTERECTOMY      partial    JOINT REPLACEMENT Bilateral     Knees    KNEE ARTHROSCOPY Right 8/27/2015    Dr Roberts    lithtotrispy      LUMBAR SPINE SURGERY  10/2014    L4-L5 fusion    REVISION OF KNEE ARTHROPLASTY Left 4/5/2022    Procedure: REVISION, ARTHROPLASTY, KNEE;  Surgeon: Sri Roberts MD;  [facility];  Service: Orthopedics;  Laterality: Left;  general, regional w catheter, adductor, epidural, spinal, ester 50cc     Family History     Problem Relation (Age of Onset)    Bipolar disorder Daughter    Cancer Mother (55), Father (82), Brother, Brother, Brother    Diabetes Mellitus Brother, Brother, Brother    Heart disease Brother, Brother, Brother    Kidney disease Daughter    Melanoma Brother, Brother, Brother    Paget's disease of bone Mother    Psoriasis Father, Maternal Grandmother    Thyroid disease Mother        Social History     Socioeconomic History    Marital status:      Spouse name: Jose    Number of children: 3   Tobacco Use    Smoking status: Never Smoker    Smokeless tobacco: Never Used    Tobacco comment: ; ; 3 children   Substance and Sexual Activity    Alcohol use: Yes     Comment: rarely    Drug use: No   Social History Narrative    Stairs-        Review of Systems   Constitutional: Negative for activity change, appetite change and fever.   HENT:  Negative for ear discharge.    Eyes: Negative for pain and visual disturbance.   Respiratory: Negative for cough, chest tightness and shortness of breath.    Cardiovascular: Negative for chest pain and palpitations.   Gastrointestinal: Negative for abdominal distention and abdominal pain.   Endocrine: Negative for polydipsia, polyphagia and polyuria.   Musculoskeletal: Positive for arthralgias, back pain and myalgias. Negative for neck pain.   Skin: Negative for color change and wound.   Neurological: Negative for dizziness, weakness, numbness and headaches.   Psychiatric/Behavioral: Negative for behavioral problems, confusion and dysphoric mood.       OBJECTIVE:     Vital Signs  Pulse: 86  BP: 130/83  Pain Score:   4  Weight: 83.9 kg (185 lb)  Body mass index is 34.96 kg/m².    Neurosurgery Physical Exam  General: well developed, well nourished, no distress.   Head: normocephalic, atraumatic  Neurologic: Alert and oriented. Thought content appropriate.  GCS: Motor: 6/Verbal: 5/Eyes: 4 GCS Total: 15  Mental Status: Awake, Alert, Oriented x 4  Language: No aphasia  Speech: No dysarthria  Cranial nerves: face symmetric, tongue midline, CN II-XII grossly intact.   Eyes: pupils equal, round, reactive to light with accomodation, EOMI.   Pulmonary: normal respirations, no signs of respiratory distress  Abdomen: soft, non-distended  Skin: Skin is warm, dry and intact.  Sensory: intact to light touch throughout  Motor Strength:Moves all extremities spontaneously with good tone.    Strength  Deltoids Triceps Biceps Wrist Extension Wrist Flexion Hand    Upper: R 5/5 5/5 5/5 5/5 5/5 5/5    L 5/5 5/5 5/5 5/5 5/5 5/5     HF KE KF DF PF EHL   Lower: R 5/5 5/5 5/5 5/5 5/5 5/5    L 4+/5 4+/5 4+/5 5/5 5/5 5/5     Cerebellar:   Gait stable, fluid.      Diagnostic Results:  I have personally reviewed the MRI lumbar spine dated 5/18/22. The imaging shows Postsurgical changes of L4-L5 posterior fusion and decompressive laminectomy.  Degenerative changes, most advanced at L3-L4, resulting in moderate to severe right neural foraminal narrowing.    ASSESSMENT/PLAN:   Carleen Kincaid is a 61 y.o. female seen today in clinic to review the image findings. States that some of her symptoms have improved since her recent left knee arthroplasty. She is able to stand and walk for longer periods. She is doing PT. We discussed that she might benefit from an injection at L3-4. She was agreeable. I have placed the order. I would like the patient to follow-up in clinic with Марина Hanna in 8 weeks after the injection to discuss progress with pain. I have encouraged her to contact the clinic with any questions, concerns, or adverse clinical changes. She verbalized understanding.     CIRA Yip-FELISHA  Neurosurgery  [facility].     Note dictated with voice recognition software, please excuse any grammatical errors.

## 2022-06-10 ENCOUNTER — PATIENT MESSAGE (OUTPATIENT)
Dept: DERMATOLOGY | Facility: CLINIC | Age: 62
End: 2022-06-10
Payer: COMMERCIAL

## 2022-06-16 ENCOUNTER — TELEPHONE (OUTPATIENT)
Dept: PAIN MEDICINE | Facility: CLINIC | Age: 62
End: 2022-06-16
Payer: COMMERCIAL

## 2022-06-16 NOTE — TELEPHONE ENCOUNTER
Spoke with pt and she states that she wants to make sure epidural injection is local and not systemic and she has psoriatic arthritis recently started cosentyx and wants to make sure there is no contraindication. please advise.

## 2022-06-16 NOTE — TELEPHONE ENCOUNTER
Spoke with pt advised of MD message. She has already spoke with other md she lewis wish to switch procedure dates from 06/21 to 07/13 I have informed surgery

## 2022-06-16 NOTE — TELEPHONE ENCOUNTER
----- Message from Consuelo Rodriguez sent at 6/16/2022 10:58 AM CDT -----  Contact: Patient  Type: Patient Call Back         Who called: Patient         What is the request in detail: requesting to speak regarding her procedure; requesting to resched for first aprt of July; btwn 11-15 of July; needs to know if injection is systemic or local; please advise         Can the clinic reply by [facility]? yes         Would the patient rather a call back or a response via [facility]? call         Best call back number: [phone]         Additional Information:            Thank You

## 2022-06-28 NOTE — H&P (VIEW-ONLY)
Subjective:          Chief Complaint: Carleen Kincaid is a 61 y.o. female who had concerns including Post-op Evaluation of the Left Knee.    Carleen Kincaid presents to our clinic today for post operative evaluation of the below revision TKA procedure. She is now 2.5 months post op    Carleen Kincaid presents in clinic today for a 7 week post-op evaluation s/p procedure below.   Pt has no complaints at this time. She is doing PT at [facility] and progressing as scheduled. She is no longer taking pain medication. Needs a refill on Robaxin.    DATE OF PROCEDURE:  04/05/22     ATTENDING SURGEON:  Sri Roberts M.D.     CO-SURGEON:  Jeovany Jauregui M.D. - Resident     FIRST ASSISTANT:  Светлана Agarwal PA-C-assistant.        PREOPERATIVE DIAGNOSIS:  Failed Left total knee replacement.     POSTOPERATIVE DIAGNOSIS:  Failed Left total knee replacement.     PROCEDURES PERFORMED:  Left complex revision total knee replacement.    DATE OF PROCEDURE:  12/12/2017     ATTENDING SURGEON:  Sri Roberts M.D.     CO-SURGEON:  Santiago Qiu M.D.     The complexity of the case required co-surgeon to help with the case. A separate operative report will be dictated by Dr Sri Roberts.     FIRST ASSISTANT:  SMA Sreekanth-assistant.     SECOND ASSISTANT:  Jossue Irene M.D.     PREOPERATIVE DIAGNOSIS:  Failed right total knee replacement.     POSTOPERATIVE DIAGNOSIS:  Failed right total knee replacement.     PROCEDURES PERFORMED:  Right complex revision total knee replacement.      Review of Systems   Constitutional: Negative for chills and fever.   HENT: Negative for congestion and sore throat.    Eyes: Negative for discharge and double vision.   Cardiovascular: Negative for chest pain, palpitations and syncope.   Respiratory: Negative for cough and shortness of breath.    Endocrine: Negative for cold intolerance and heat intolerance.   Skin: Negative for dry skin and rash.   Musculoskeletal: Positive for joint pain and joint  swelling. Negative for arthritis, back pain, falls, gout, muscle cramps, muscle weakness, myalgias, neck pain and stiffness.   Gastrointestinal: Negative for abdominal pain, nausea and vomiting.   Neurological: Negative for focal weakness, numbness and paresthesias.                   Objective:        General: Carleen is well-developed, well-nourished, appears stated age, in no acute distress, alert and oriented to time, place and person.     General    Nursing note and vitals reviewed.  Constitutional: She is oriented to person, place, and time. She appears well-developed and well-nourished. No distress.   HENT:   Mouth/Throat: No oropharyngeal exudate.   Eyes: Conjunctivae and EOM are normal. Pupils are equal, round, and reactive to light. Right eye exhibits no discharge. Left eye exhibits no discharge.   Neck: Neck supple. No JVD present.   Cardiovascular: Normal heart sounds and intact distal pulses.    No murmur heard.  Pulmonary/Chest: Effort normal and breath sounds normal. No respiratory distress.   Abdominal: Soft. Bowel sounds are normal. She exhibits no distension. There is no abdominal tenderness.   Neurological: She is alert and oriented to person, place, and time. She has normal reflexes. No cranial nerve deficit. Coordination normal.   Psychiatric: She has a normal mood and affect. Her behavior is normal. Judgment and thought content normal.     General Musculoskeletal Exam   Gait: normal       Right Knee Exam     Inspection   Erythema: absent  Scars: present  Swelling: absent  Effusion: absent  Deformity: absent  Bruising: absent    Tenderness   The patient is experiencing no tenderness.     Range of Motion   Extension: 0   Flexion: 130     Tests   Meniscus   Sonal:  Medial - negative Lateral - negative  Ligament Examination Lachman: normal (-1 to 2mm) PCL-Posterior Drawer: normal (0 to 2mm)     MCL - Valgus: normal (0 to 2mm)  LCL - Varus: normalPivot Shift: normal (Equal)Reverse Pivot Shift:  normal (Equal)Dial Test at 30 degrees: normal (< 5 degrees)Dial Test at 90 degrees: normal (< 5 degrees)  Posterior Sag Test: negative  Posterolateral Corner: stable  Patella   Patellar apprehension: negative  Passive Patellar Tilt: neutral  Patellar Tracking: normal  Patellar Glide (quadrants): Lateral - 1   Medial - 2  Q-Angle at 90 degrees: normal  Patellar Grind: negative  J-Sign: none    Other   Meniscal Cyst: absent  Popliteal (Baker's) Cyst: absent  Sensation: normal    Left Knee Exam     Inspection   Erythema: absent  Scars: present  Swelling: absent  Effusion: absent  Deformity: absent  Bruising: absent    Tenderness   The patient is experiencing no tenderness.     Range of Motion   Extension: 0   Flexion: 130     Tests   Meniscus   Sonal:  Medial - negative Lateral - negative  Stability Lachman: normal (-1 to 2mm) PCL-Posterior Drawer: normal (0 to 2mm)  MCL - Valgus: normal (0 to 2mm)  LCL - Varus: normal (0 to 2mm)Pivot Shift: normal (Equal)Reverse Pivot Shift: normal (Equal)Dial Test at 30 degrees: normal (< 5 degrees)Dial Test at 90 degrees: normal (< 5 degrees)  Posterior Sag Test: negative  Posterolateral Corner: stable  Patella   Patellar apprehension: negative  Passive Patellar Tilt: neutral  Patellar Tracking: normal  Patellar Glide (Quadrants): Lateral - 1 Medial - 2  Q-Angle at 90 degrees: normal  Patellar Grind: negative  J-Sign: J sign absent    Other   Meniscal Cyst: absent  Popliteal (Baker's) Cyst: absent  Sensation: normal    Comments:  Incision clean/dry/intact  No sign of infection  Mild swelling  Compartments soft  Neurovascular status intact in extremity      Right Hip Exam     Tests   Julio Cesar: negative  Left Hip Exam     Tests   Julio Cesar: negative          Muscle Strength   Right Lower Extremity   Hip Abduction: 5/5   Quadriceps:  5/5   Hamstrin/5   Left Lower Extremity   Hip Abduction: 5/5   Quadriceps:  4/5   Hamstrin/5     Reflexes     Left Side  Achilles:  2+  Quadriceps:   2+    Right Side   Achilles:  2+  Quadriceps:  2+    Vascular Exam     Right Pulses  Dorsalis Pedis:      2+  Posterior Tibial:      2+        Left Pulses  Dorsalis Pedis:      2+  Posterior Tibial:      2+        Edema  Right Lower Leg: absent  Left Lower Leg: absent      Radiographic Findings: 5/23/22  Bilateral knees:  FINDINGS:  Knee ortho bilateral with flexion.     Four views bilateral knees.     Right: There is a TKA in place good alignment no complication.     Left: The TKA in place good alignment no complication.    X-ray Knee Ortho Bilateral with Flexion  Narrative: EXAMINATION:  XR KNEE ORTHO BILAT WITH FLEXION    CLINICAL HISTORY:  Pain in left knee    TECHNIQUE:  AP standing of both knees, PA flexion standing views of both knees, and Merchant views of both knees were performed.  Lateral views of both knees were also performed.    COMPARISON:  No 05/23/2022 ne    FINDINGS:  Bilateral knee arthroplasties are identified.  The position and alignment is satisfactory and unchanged as compared to the previous study  Impression: See above    Electronically signed by: Shaka Johnson MD  Date:    06/29/2022  Time:    11:01            Assessment:       Encounter Diagnoses   Name Primary?    Left knee pain, unspecified chronicity Yes    History of total bilateral knee replacement     Primary osteoarthritis involving multiple joints     BMI 35.0-35.9,adult           Plan:       1. IKDC, SF-12 and KOOS was filled out today in clinic.     RTC in 3 months with Dr. Sri Roberts Patient will fill out IKDC, SF-12 and KOOS on return.    2. Medications: Refills of the following Rx were sent to patients preferred Pharmacy:  methocarbamoL (ROBAXIN) 500 MG Tab    3. Physical Therapy: Continue/Begin: Continue at [facility] in [place], can continue with scheduled visits then transition to home program      4. HEP: N/A    5. Procedures/Procedural Planning:   N/A    6. DME: N/A    7. Work/Sport Status: retired    8. Visit Summary:  Patient doing well, return in 3 months for repeat xrays and evaluation                                   [facility] patient questionnaires have been collected today.

## 2022-06-28 NOTE — PROGRESS NOTES
Subjective:          Chief Complaint: Carleen Kincaid is a 61 y.o. female who had concerns including Post-op Evaluation of the Left Knee.    Carleen Kincaid presents to our clinic today for post operative evaluation of the below revision TKA procedure. She is now 2.5 months post op    Carleen Kincaid presents in clinic today for a 7 week post-op evaluation s/p procedure below.   Pt has no complaints at this time. She is doing PT at [facility] and progressing as scheduled. She is no longer taking pain medication. Needs a refill on Robaxin.    DATE OF PROCEDURE:  04/05/22     ATTENDING SURGEON:  Sri Roberts M.D.     CO-SURGEON:  Jeovany Jauregui M.D. - Resident     FIRST ASSISTANT:  Светлана Agarwal PA-C-assistant.        PREOPERATIVE DIAGNOSIS:  Failed Left total knee replacement.     POSTOPERATIVE DIAGNOSIS:  Failed Left total knee replacement.     PROCEDURES PERFORMED:  Left complex revision total knee replacement.    DATE OF PROCEDURE:  12/12/2017     ATTENDING SURGEON:  Sri Roberts M.D.     CO-SURGEON:  Santiago Qiu M.D.     The complexity of the case required co-surgeon to help with the case. A separate operative report will be dictated by Dr Sri Roberts.     FIRST ASSISTANT:  SMA Sreekanth-assistant.     SECOND ASSISTANT:  Jossue Irene M.D.     PREOPERATIVE DIAGNOSIS:  Failed right total knee replacement.     POSTOPERATIVE DIAGNOSIS:  Failed right total knee replacement.     PROCEDURES PERFORMED:  Right complex revision total knee replacement.      Review of Systems   Constitutional: Negative for chills and fever.   HENT: Negative for congestion and sore throat.    Eyes: Negative for discharge and double vision.   Cardiovascular: Negative for chest pain, palpitations and syncope.   Respiratory: Negative for cough and shortness of breath.    Endocrine: Negative for cold intolerance and heat intolerance.   Skin: Negative for dry skin and rash.   Musculoskeletal: Positive for joint pain and joint  swelling. Negative for arthritis, back pain, falls, gout, muscle cramps, muscle weakness, myalgias, neck pain and stiffness.   Gastrointestinal: Negative for abdominal pain, nausea and vomiting.   Neurological: Negative for focal weakness, numbness and paresthesias.                   Objective:        General: Carleen is well-developed, well-nourished, appears stated age, in no acute distress, alert and oriented to time, place and person.     General    Nursing note and vitals reviewed.  Constitutional: She is oriented to person, place, and time. She appears well-developed and well-nourished. No distress.   HENT:   Mouth/Throat: No oropharyngeal exudate.   Eyes: Conjunctivae and EOM are normal. Pupils are equal, round, and reactive to light. Right eye exhibits no discharge. Left eye exhibits no discharge.   Neck: Neck supple. No JVD present.   Cardiovascular: Normal heart sounds and intact distal pulses.    No murmur heard.  Pulmonary/Chest: Effort normal and breath sounds normal. No respiratory distress.   Abdominal: Soft. Bowel sounds are normal. She exhibits no distension. There is no abdominal tenderness.   Neurological: She is alert and oriented to person, place, and time. She has normal reflexes. No cranial nerve deficit. Coordination normal.   Psychiatric: She has a normal mood and affect. Her behavior is normal. Judgment and thought content normal.     General Musculoskeletal Exam   Gait: normal       Right Knee Exam     Inspection   Erythema: absent  Scars: present  Swelling: absent  Effusion: absent  Deformity: absent  Bruising: absent    Tenderness   The patient is experiencing no tenderness.     Range of Motion   Extension: 0   Flexion: 130     Tests   Meniscus   Sonal:  Medial - negative Lateral - negative  Ligament Examination Lachman: normal (-1 to 2mm) PCL-Posterior Drawer: normal (0 to 2mm)     MCL - Valgus: normal (0 to 2mm)  LCL - Varus: normalPivot Shift: normal (Equal)Reverse Pivot Shift:  normal (Equal)Dial Test at 30 degrees: normal (< 5 degrees)Dial Test at 90 degrees: normal (< 5 degrees)  Posterior Sag Test: negative  Posterolateral Corner: stable  Patella   Patellar apprehension: negative  Passive Patellar Tilt: neutral  Patellar Tracking: normal  Patellar Glide (quadrants): Lateral - 1   Medial - 2  Q-Angle at 90 degrees: normal  Patellar Grind: negative  J-Sign: none    Other   Meniscal Cyst: absent  Popliteal (Baker's) Cyst: absent  Sensation: normal    Left Knee Exam     Inspection   Erythema: absent  Scars: present  Swelling: absent  Effusion: absent  Deformity: absent  Bruising: absent    Tenderness   The patient is experiencing no tenderness.     Range of Motion   Extension: 0   Flexion: 130     Tests   Meniscus   Sonal:  Medial - negative Lateral - negative  Stability Lachman: normal (-1 to 2mm) PCL-Posterior Drawer: normal (0 to 2mm)  MCL - Valgus: normal (0 to 2mm)  LCL - Varus: normal (0 to 2mm)Pivot Shift: normal (Equal)Reverse Pivot Shift: normal (Equal)Dial Test at 30 degrees: normal (< 5 degrees)Dial Test at 90 degrees: normal (< 5 degrees)  Posterior Sag Test: negative  Posterolateral Corner: stable  Patella   Patellar apprehension: negative  Passive Patellar Tilt: neutral  Patellar Tracking: normal  Patellar Glide (Quadrants): Lateral - 1 Medial - 2  Q-Angle at 90 degrees: normal  Patellar Grind: negative  J-Sign: J sign absent    Other   Meniscal Cyst: absent  Popliteal (Baker's) Cyst: absent  Sensation: normal    Comments:  Incision clean/dry/intact  No sign of infection  Mild swelling  Compartments soft  Neurovascular status intact in extremity      Right Hip Exam     Tests   Julio Cesar: negative  Left Hip Exam     Tests   Julio Cesar: negative          Muscle Strength   Right Lower Extremity   Hip Abduction: 5/5   Quadriceps:  5/5   Hamstrin/5   Left Lower Extremity   Hip Abduction: 5/5   Quadriceps:  4/5   Hamstrin/5     Reflexes     Left Side  Achilles:  2+  Quadriceps:   2+    Right Side   Achilles:  2+  Quadriceps:  2+    Vascular Exam     Right Pulses  Dorsalis Pedis:      2+  Posterior Tibial:      2+        Left Pulses  Dorsalis Pedis:      2+  Posterior Tibial:      2+        Edema  Right Lower Leg: absent  Left Lower Leg: absent      Radiographic Findings: 5/23/22  Bilateral knees:  FINDINGS:  Knee ortho bilateral with flexion.     Four views bilateral knees.     Right: There is a TKA in place good alignment no complication.     Left: The TKA in place good alignment no complication.    X-ray Knee Ortho Bilateral with Flexion  Narrative: EXAMINATION:  XR KNEE ORTHO BILAT WITH FLEXION    CLINICAL HISTORY:  Pain in left knee    TECHNIQUE:  AP standing of both knees, PA flexion standing views of both knees, and Merchant views of both knees were performed.  Lateral views of both knees were also performed.    COMPARISON:  No 05/23/2022 ne    FINDINGS:  Bilateral knee arthroplasties are identified.  The position and alignment is satisfactory and unchanged as compared to the previous study  Impression: See above    Electronically signed by: Shaka Johnson MD  Date:    06/29/2022  Time:    11:01            Assessment:       Encounter Diagnoses   Name Primary?    Left knee pain, unspecified chronicity Yes    History of total bilateral knee replacement     Primary osteoarthritis involving multiple joints     BMI 35.0-35.9,adult           Plan:       1. IKDC, SF-12 and KOOS was filled out today in clinic.     RTC in 3 months with Dr. Sri Roberts Patient will fill out IKDC, SF-12 and KOOS on return.    2. Medications: Refills of the following Rx were sent to patients preferred Pharmacy:  methocarbamoL (ROBAXIN) 500 MG Tab    3. Physical Therapy: Continue/Begin: Continue at [facility] in [place], can continue with scheduled visits then transition to home program      4. HEP: N/A    5. Procedures/Procedural Planning:   N/A    6. DME: N/A    7. Work/Sport Status: retired    8. Visit Summary:  Patient doing well, return in 3 months for repeat xrays and evaluation                                   [facility] patient questionnaires have been collected today.

## 2022-06-29 ENCOUNTER — OFFICE VISIT (OUTPATIENT)
Dept: SPORTS MEDICINE | Facility: CLINIC | Age: 62
End: 2022-06-29
Payer: COMMERCIAL

## 2022-06-29 ENCOUNTER — HOSPITAL ENCOUNTER (OUTPATIENT)
Dept: RADIOLOGY | Facility: HOSPITAL | Age: 62
Discharge: HOME | End: 2022-06-29
Attending: ORTHOPAEDIC SURGERY
Payer: COMMERCIAL

## 2022-06-29 VITALS
HEIGHT: 62 IN | DIASTOLIC BLOOD PRESSURE: 94 MMHG | BODY MASS INDEX: 33.24 KG/M2 | WEIGHT: 180.63 LBS | HEART RATE: 80 BPM | SYSTOLIC BLOOD PRESSURE: 148 MMHG | TEMPERATURE: 98 F

## 2022-06-29 DIAGNOSIS — M25.562 LEFT KNEE PAIN, UNSPECIFIED CHRONICITY: ICD-10-CM

## 2022-06-29 DIAGNOSIS — M25.562 LEFT KNEE PAIN, UNSPECIFIED CHRONICITY: Primary | ICD-10-CM

## 2022-06-29 DIAGNOSIS — Z96.653 HISTORY OF TOTAL BILATERAL KNEE REPLACEMENT: ICD-10-CM

## 2022-06-29 DIAGNOSIS — M15.9 PRIMARY OSTEOARTHRITIS INVOLVING MULTIPLE JOINTS: ICD-10-CM

## 2022-06-29 PROBLEM — G89.29 CHRONIC PAIN OF RIGHT KNEE: Status: RESOLVED | Noted: 2017-12-12 | Resolved: 2022-06-29

## 2022-06-29 PROBLEM — T84.093A FAILED TOTAL LEFT KNEE REPLACEMENT: Status: RESOLVED | Noted: 2022-03-09 | Resolved: 2022-06-29

## 2022-06-29 PROBLEM — M25.561 CHRONIC PAIN OF RIGHT KNEE: Status: RESOLVED | Noted: 2017-12-12 | Resolved: 2022-06-29

## 2022-06-29 PROCEDURE — 3077F PR MOST RECENT SYSTOLIC BLOOD PRESSURE >= 140 MM HG: ICD-10-PCS | Mod: CPTII,S$GLB,, | Performed by: ORTHOPAEDIC SURGERY

## 2022-06-29 PROCEDURE — 99999 PR PBB SHADOW E&M-EST. PATIENT-LVL III: ICD-10-PCS | Mod: PBBFAC,,, | Performed by: ORTHOPAEDIC SURGERY

## 2022-06-29 PROCEDURE — 99024 PR POST-OP FOLLOW-UP VISIT: ICD-10-PCS | Mod: S$GLB,,, | Performed by: ORTHOPAEDIC SURGERY

## 2022-06-29 PROCEDURE — 4010F PR ACE/ARB THEARPY RXD/TAKEN: ICD-10-PCS | Mod: CPTII,S$GLB,, | Performed by: ORTHOPAEDIC SURGERY

## 2022-06-29 PROCEDURE — 4010F ACE/ARB THERAPY RXD/TAKEN: CPT | Mod: CPTII,S$GLB,, | Performed by: ORTHOPAEDIC SURGERY

## 2022-06-29 PROCEDURE — 1159F MED LIST DOCD IN RCRD: CPT | Mod: CPTII,S$GLB,, | Performed by: ORTHOPAEDIC SURGERY

## 2022-06-29 PROCEDURE — 99999 PR PBB SHADOW E&M-EST. PATIENT-LVL III: CPT | Mod: PBBFAC,,, | Performed by: ORTHOPAEDIC SURGERY

## 2022-06-29 PROCEDURE — 3077F SYST BP >= 140 MM HG: CPT | Mod: CPTII,S$GLB,, | Performed by: ORTHOPAEDIC SURGERY

## 2022-06-29 PROCEDURE — 99024 POSTOP FOLLOW-UP VISIT: CPT | Mod: S$GLB,,, | Performed by: ORTHOPAEDIC SURGERY

## 2022-06-29 PROCEDURE — 3080F PR MOST RECENT DIASTOLIC BLOOD PRESSURE >= 90 MM HG: ICD-10-PCS | Mod: CPTII,S$GLB,, | Performed by: ORTHOPAEDIC SURGERY

## 2022-06-29 PROCEDURE — 73564 X-RAY EXAM KNEE 4 OR MORE: CPT | Mod: TC,50

## 2022-06-29 PROCEDURE — 1159F PR MEDICATION LIST DOCUMENTED IN MEDICAL RECORD: ICD-10-PCS | Mod: CPTII,S$GLB,, | Performed by: ORTHOPAEDIC SURGERY

## 2022-06-29 PROCEDURE — 3008F BODY MASS INDEX DOCD: CPT | Mod: CPTII,S$GLB,, | Performed by: ORTHOPAEDIC SURGERY

## 2022-06-29 PROCEDURE — 73564 XR KNEE ORTHO BILAT WITH FLEXION: ICD-10-PCS | Mod: 26,,, | Performed by: RADIOLOGY

## 2022-06-29 PROCEDURE — 1160F RVW MEDS BY RX/DR IN RCRD: CPT | Mod: CPTII,S$GLB,, | Performed by: ORTHOPAEDIC SURGERY

## 2022-06-29 PROCEDURE — 73564 X-RAY EXAM KNEE 4 OR MORE: CPT | Mod: 26,,, | Performed by: RADIOLOGY

## 2022-06-29 PROCEDURE — 1160F PR REVIEW ALL MEDS BY PRESCRIBER/CLIN PHARMACIST DOCUMENTED: ICD-10-PCS | Mod: CPTII,S$GLB,, | Performed by: ORTHOPAEDIC SURGERY

## 2022-06-29 PROCEDURE — 3008F PR BODY MASS INDEX (BMI) DOCUMENTED: ICD-10-PCS | Mod: CPTII,S$GLB,, | Performed by: ORTHOPAEDIC SURGERY

## 2022-06-29 PROCEDURE — 3080F DIAST BP >= 90 MM HG: CPT | Mod: CPTII,S$GLB,, | Performed by: ORTHOPAEDIC SURGERY

## 2022-06-29 RX ORDER — AMOXICILLIN 875 MG/1
875 TABLET, FILM COATED ORAL 2 TIMES DAILY
Status: ON HOLD | COMMUNITY
Start: 2022-06-23 | End: 2022-07-13

## 2022-07-11 ENCOUNTER — PATIENT MESSAGE (OUTPATIENT)
Dept: SURGERY | Facility: AMBULARY SURGERY CENTER | Age: 62
End: 2022-07-11
Payer: COMMERCIAL

## 2022-07-11 ENCOUNTER — TELEPHONE (OUTPATIENT)
Dept: SURGERY | Facility: AMBULARY SURGERY CENTER | Age: 62
End: 2022-07-11
Payer: COMMERCIAL

## 2022-07-11 NOTE — TELEPHONE ENCOUNTER
Pt called regarding pre op arrival time and instructions. Pt states she has covid vaccine card and will bring day of procedure. Also, pt elected to have procedure done as local only, without sedation, so original scheduled date of Wed 7/13 kept. Instructions and arrival time given.

## 2022-07-12 NOTE — DISCHARGE INSTRUCTIONS
Before leaving, please make sure you have all your personal belongings such as glasses, purses, wallets, keys, cell phones, jewelry, jackets etc    Pain injection instructions:     This procedure may take a couple weeks to relieve pain  You may get some pain relief from the local anesthetic initally.   Steroids can have side effects of flushed face or nervous feeling.    No driving for 24 hrs.   Activity as tolerated- gradually increase activities.  Dont lift over 10 lbs for 24 hrs   No heat at injection sites for 2 full days. No heating pads, hot tubs, saunas, or swimming in any body of water or pool for 2 full days.  Use ice pack for mild swelling and for comfort , apply for 20 minutes, remove for 20 minute intervals. No direct contact of ice itself  to skin.  May shower today if not drowsy.  Do not allow shower water to beat on injections site(s) for 2 full days. No tub baths for two full days.      Resume Aspirin, Plavix, or Coumadin the day after the procedure unless otherwise instructed.   If diabetic,monitor your glucose carefully as steroids can increase your glucose level    Seek immediate medical help for:   Severe increase in your usual pain or appearance of new pain.  Prolonged (more than 8 hours) or increasing weakness or numbness in the legs or arms.   .    Fever above 100.4 degrees F ,Drainage,redness,active bleeding, or increased swelling at the injection site.  Headache, shortness of breath, chest pain, or breathing problems.    After Surgery:  Always be aware that any surgery can cause these symptoms:    Pain- Medication can be prescribed for pain to decrease your pain but may not completely take your pain away. Over the Counter pain medicine my be enough and you can always use Ice and rest to help ease pain.    Bleeding- a little bleeding after a surgery is usually within normal.  If there is a lot of blood you need to notify your MD.  Emergency treatments of bleeding are cold application,  elevation of the bleeding site and compression.    Infection- Infection after surgery is NOT a normal occurrence.  Signs of infection are fever, swelling, hot to touch the incision.  If this occurs notify your MD immediately.    Nausea- this can be common after a surgery especially if you have had anesthesia medicine or are taking pain medicine.  Steroids have a side effect of nausea sometimes. Staying on clear liquids, bland foods, gingerale, or over the counter anti nausea medicines can help.  If you vomit more than once, notify your MD.  Anti Nausea medicines can be prescribed.

## 2022-07-13 ENCOUNTER — HOSPITAL ENCOUNTER (OUTPATIENT)
Facility: AMBULARY SURGERY CENTER | Age: 62
Discharge: HOME | End: 2022-07-13
Attending: ANESTHESIOLOGY | Admitting: ANESTHESIOLOGY
Payer: COMMERCIAL

## 2022-07-13 DIAGNOSIS — M54.16 LUMBAR RADICULITIS: ICD-10-CM

## 2022-07-13 PROCEDURE — 62323 NJX INTERLAMINAR LMBR/SAC: CPT | Mod: ,,, | Performed by: ANESTHESIOLOGY

## 2022-07-13 PROCEDURE — 62323 PR INJ LUMBAR/SACRAL, W/IMAGING GUIDANCE: ICD-10-PCS | Mod: ,,, | Performed by: ANESTHESIOLOGY

## 2022-07-13 PROCEDURE — 62323 NJX INTERLAMINAR LMBR/SAC: CPT | Performed by: ANESTHESIOLOGY

## 2022-07-13 RX ORDER — SODIUM CHLORIDE 0.9 % (FLUSH) 0.9 %
SYRINGE (ML) INJECTION
Status: DISCONTINUED | OUTPATIENT
Start: 2022-07-13 | End: 2022-07-13 | Stop reason: HOSPADM

## 2022-07-13 RX ORDER — DEXAMETHASONE SODIUM PHOSPHATE 10 MG/ML
INJECTION INTRAMUSCULAR; INTRAVENOUS
Status: DISCONTINUED | OUTPATIENT
Start: 2022-07-13 | End: 2022-07-13 | Stop reason: HOSPADM

## 2022-07-13 RX ORDER — LIDOCAINE HYDROCHLORIDE 10 MG/ML
INJECTION, SOLUTION EPIDURAL; INFILTRATION; INTRACAUDAL; PERINEURAL
Status: DISCONTINUED | OUTPATIENT
Start: 2022-07-13 | End: 2022-07-13 | Stop reason: HOSPADM

## 2022-07-13 RX ORDER — SODIUM CHLORIDE, SODIUM LACTATE, POTASSIUM CHLORIDE, CALCIUM CHLORIDE 600; 310; 30; 20 MG/100ML; MG/100ML; MG/100ML; MG/100ML
INJECTION, SOLUTION INTRAVENOUS ONCE AS NEEDED
Status: DISCONTINUED | OUTPATIENT
Start: 2022-07-13 | End: 2022-07-13 | Stop reason: HOSPADM

## 2022-07-13 NOTE — OP NOTE
PROCEDURE DATE: 7/13/2022    Procedure:   Interlaminar epidural steroid injection at L3-4 under fluoroscopic guidance.    Diagnosis: lUMBAR radiculitis  pOSTOP DIAGNOSIS: sAME    Physician: Shaka Caldwell M.D.    Medications injected:10 mg dexamethasone with 4 ml of preservative free NaCl    Local anesthetic injected:    Lidocaine 1% 2 ml total    Sedation Medications: None    Estimated blood loss:  None    Complications:  none    Technique:  Time-out taken to identify patient and procedure prior to starting the procedure.  With the patient laying in a prone position, the area was prepped and draped in the usual sterile fashion using ChloraPrep and a fenestrated drape.  After determining the target level with an AP fluoroscopic view, local anesthetic was given using a 25-gauge 1.5 inch needle by raising a wheal and then infiltrating toward the interlaminar entry space.  A 3.5inch 20-gauge Touhy needle was introduced under AP fluoroscopic guidance to the interlaminar space of L3-4. Once the trajectory was established, the needle was visualized in the lateral view and advanced using loss of resistance technique. Once in the desired position, 1ml contrast was injected to confirm placement and there was no vascular uptake nor intrathecal spread.  The medication was then injected slowly. The patient tolerated the procedure well.      The patient was monitored after the procedure.   They were given post-procedure and discharge instructions to follow at home.  The patient was discharged in a stable condition.

## 2022-07-13 NOTE — PLAN OF CARE
Pt received from OR via stretcher from local only procedure. Pt denies pain, nausea, dizziness. Placed on bedside monitors. VS stable. Pt refusing food/drink. Reviewed discharge instructions with pt. Pt up out of bed to dress. Escorted to car via self ambulation. Pt driving self home

## 2022-07-15 VITALS
OXYGEN SATURATION: 98 % | RESPIRATION RATE: 20 BRPM | HEIGHT: 61 IN | BODY MASS INDEX: 34.74 KG/M2 | DIASTOLIC BLOOD PRESSURE: 86 MMHG | WEIGHT: 184 LBS | HEART RATE: 66 BPM | SYSTOLIC BLOOD PRESSURE: 142 MMHG | TEMPERATURE: 98 F

## 2022-10-18 ENCOUNTER — TELEPHONE (OUTPATIENT)
Dept: NEUROSURGERY | Facility: CLINIC | Age: 62
End: 2022-10-18
Payer: COMMERCIAL

## 2022-10-18 NOTE — TELEPHONE ENCOUNTER
Attempted to contact patient , however she was not available .   Lvm requesting a call back due to last chart note was a recommendation for an MRI before f/u . ( I do not see one in Epic --depending if she had one or not may have to reschedule.

## 2023-01-17 DIAGNOSIS — L40.0 PSORIASIS VULGARIS: ICD-10-CM

## 2023-01-17 DIAGNOSIS — Z79.899 LONG-TERM USE OF HIGH-RISK MEDICATION: ICD-10-CM

## 2023-01-17 RX ORDER — SECUKINUMAB 150 MG/ML
INJECTION SUBCUTANEOUS
Qty: 2 EACH | Refills: 2 | Status: SHIPPED | OUTPATIENT
Start: 2023-01-17 | End: 2023-04-03 | Stop reason: SDUPTHER

## 2023-01-18 ENCOUNTER — PATIENT MESSAGE (OUTPATIENT)
Dept: DERMATOLOGY | Facility: CLINIC | Age: 63
End: 2023-01-18
Payer: COMMERCIAL

## 2023-02-09 ENCOUNTER — PATIENT MESSAGE (OUTPATIENT)
Dept: DERMATOLOGY | Facility: CLINIC | Age: 63
End: 2023-02-09
Payer: COMMERCIAL

## 2023-03-27 NOTE — CARE UPDATE
Report received from Camila RING   Pt transferred to room 378. AAO x 4. Resp. Even and unlabored. Peripherl iv patent. Dsg to right knee CDI. Polar ice in use. States that her pain level is a 3, comfortable @ this time. VSS. TEds/scd's in place bilateral. Call light in reach.  @ bedside. Voided on bedpan @ this time, clear, yellow urine. Safety maintained.   No

## 2023-03-29 ENCOUNTER — PATIENT MESSAGE (OUTPATIENT)
Dept: DERMATOLOGY | Facility: CLINIC | Age: 63
End: 2023-03-29
Payer: COMMERCIAL

## 2023-03-30 ENCOUNTER — LAB VISIT (OUTPATIENT)
Dept: LAB | Facility: HOSPITAL | Age: 63
End: 2023-03-30
Attending: DERMATOLOGY
Payer: COMMERCIAL

## 2023-03-30 ENCOUNTER — OFFICE VISIT (OUTPATIENT)
Dept: DERMATOLOGY | Facility: CLINIC | Age: 63
End: 2023-03-30
Payer: COMMERCIAL

## 2023-03-30 DIAGNOSIS — Z79.899 LONG-TERM USE OF HIGH-RISK MEDICATION: Primary | ICD-10-CM

## 2023-03-30 DIAGNOSIS — D18.01 CHERRY ANGIOMA: ICD-10-CM

## 2023-03-30 DIAGNOSIS — Z79.899 LONG-TERM USE OF HIGH-RISK MEDICATION: ICD-10-CM

## 2023-03-30 DIAGNOSIS — L40.0 PSORIASIS VULGARIS: ICD-10-CM

## 2023-03-30 LAB
ALBUMIN SERPL BCP-MCNC: 4.3 G/DL (ref 3.5–5.2)
ALP SERPL-CCNC: 133 U/L (ref 55–135)
ALT SERPL W/O P-5'-P-CCNC: 24 U/L (ref 10–44)
ANION GAP SERPL CALC-SCNC: 10 MMOL/L (ref 8–16)
AST SERPL-CCNC: 22 U/L (ref 10–40)
BASOPHILS # BLD AUTO: 0.04 K/UL (ref 0–0.2)
BASOPHILS NFR BLD: 0.5 % (ref 0–1.9)
BILIRUB SERPL-MCNC: 0.4 MG/DL (ref 0.1–1)
BUN SERPL-MCNC: 18 MG/DL (ref 8–23)
CALCIUM SERPL-MCNC: 10 MG/DL (ref 8.7–10.5)
CHLORIDE SERPL-SCNC: 105 MMOL/L (ref 95–110)
CO2 SERPL-SCNC: 25 MMOL/L (ref 23–29)
CREAT SERPL-MCNC: 0.7 MG/DL (ref 0.5–1.4)
DIFFERENTIAL METHOD: NORMAL
EOSINOPHIL # BLD AUTO: 0.1 K/UL (ref 0–0.5)
EOSINOPHIL NFR BLD: 1.1 % (ref 0–8)
ERYTHROCYTE [DISTWIDTH] IN BLOOD BY AUTOMATED COUNT: 14.1 % (ref 11.5–14.5)
EST. GFR  (NO RACE VARIABLE): >60 ML/MIN/1.73 M^2
GLUCOSE SERPL-MCNC: 85 MG/DL (ref 70–110)
HBV CORE AB SERPL QL IA: NORMAL
HBV SURFACE AB SER-ACNC: <3 MIU/ML
HBV SURFACE AB SER-ACNC: NORMAL M[IU]/ML
HBV SURFACE AG SERPL QL IA: NORMAL
HCT VFR BLD AUTO: 41.1 % (ref 37–48.5)
HCV AB SERPL QL IA: NORMAL
HGB BLD-MCNC: 13.3 G/DL (ref 12–16)
IMM GRANULOCYTES # BLD AUTO: 0.02 K/UL (ref 0–0.04)
IMM GRANULOCYTES NFR BLD AUTO: 0.3 % (ref 0–0.5)
LYMPHOCYTES # BLD AUTO: 2.9 K/UL (ref 1–4.8)
LYMPHOCYTES NFR BLD: 39.5 % (ref 18–48)
MCH RBC QN AUTO: 28.6 PG (ref 27–31)
MCHC RBC AUTO-ENTMCNC: 32.4 G/DL (ref 32–36)
MCV RBC AUTO: 88 FL (ref 82–98)
MONOCYTES # BLD AUTO: 0.4 K/UL (ref 0.3–1)
MONOCYTES NFR BLD: 5.1 % (ref 4–15)
NEUTROPHILS # BLD AUTO: 4 K/UL (ref 1.8–7.7)
NEUTROPHILS NFR BLD: 53.5 % (ref 38–73)
NRBC BLD-RTO: 0 /100 WBC
PLATELET # BLD AUTO: 260 K/UL (ref 150–450)
PMV BLD AUTO: 10.5 FL (ref 9.2–12.9)
POTASSIUM SERPL-SCNC: 4.7 MMOL/L (ref 3.5–5.1)
PROT SERPL-MCNC: 7.3 G/DL (ref 6–8.4)
RBC # BLD AUTO: 4.65 M/UL (ref 4–5.4)
SODIUM SERPL-SCNC: 140 MMOL/L (ref 136–145)
WBC # BLD AUTO: 7.41 K/UL (ref 3.9–12.7)

## 2023-03-30 PROCEDURE — 86704 HEP B CORE ANTIBODY TOTAL: CPT | Performed by: DERMATOLOGY

## 2023-03-30 PROCEDURE — 1159F MED LIST DOCD IN RCRD: CPT | Mod: CPTII,S$GLB,, | Performed by: DERMATOLOGY

## 2023-03-30 PROCEDURE — 86803 HEPATITIS C AB TEST: CPT | Performed by: DERMATOLOGY

## 2023-03-30 PROCEDURE — 11900 PR INJECTION INTO SKIN LESIONS, UP TO 7: ICD-10-PCS | Mod: S$GLB,,, | Performed by: DERMATOLOGY

## 2023-03-30 PROCEDURE — 1160F RVW MEDS BY RX/DR IN RCRD: CPT | Mod: CPTII,S$GLB,, | Performed by: DERMATOLOGY

## 2023-03-30 PROCEDURE — 86706 HEP B SURFACE ANTIBODY: CPT | Mod: 91 | Performed by: DERMATOLOGY

## 2023-03-30 PROCEDURE — 99999 PR PBB SHADOW E&M-EST. PATIENT-LVL IV: CPT | Mod: PBBFAC,,, | Performed by: DERMATOLOGY

## 2023-03-30 PROCEDURE — 85025 COMPLETE CBC W/AUTO DIFF WBC: CPT | Performed by: DERMATOLOGY

## 2023-03-30 PROCEDURE — 99214 PR OFFICE/OUTPT VISIT, EST, LEVL IV, 30-39 MIN: ICD-10-PCS | Mod: 25,S$GLB,, | Performed by: DERMATOLOGY

## 2023-03-30 PROCEDURE — 99999 PR PBB SHADOW E&M-EST. PATIENT-LVL IV: ICD-10-PCS | Mod: PBBFAC,,, | Performed by: DERMATOLOGY

## 2023-03-30 PROCEDURE — 1159F PR MEDICATION LIST DOCUMENTED IN MEDICAL RECORD: ICD-10-PCS | Mod: CPTII,S$GLB,, | Performed by: DERMATOLOGY

## 2023-03-30 PROCEDURE — 1160F PR REVIEW ALL MEDS BY PRESCRIBER/CLIN PHARMACIST DOCUMENTED: ICD-10-PCS | Mod: CPTII,S$GLB,, | Performed by: DERMATOLOGY

## 2023-03-30 PROCEDURE — 87340 HEPATITIS B SURFACE AG IA: CPT | Performed by: DERMATOLOGY

## 2023-03-30 PROCEDURE — 99214 OFFICE O/P EST MOD 30 MIN: CPT | Mod: 25,S$GLB,, | Performed by: DERMATOLOGY

## 2023-03-30 PROCEDURE — 4010F PR ACE/ARB THEARPY RXD/TAKEN: ICD-10-PCS | Mod: CPTII,S$GLB,, | Performed by: DERMATOLOGY

## 2023-03-30 PROCEDURE — 4010F ACE/ARB THERAPY RXD/TAKEN: CPT | Mod: CPTII,S$GLB,, | Performed by: DERMATOLOGY

## 2023-03-30 PROCEDURE — 80053 COMPREHEN METABOLIC PANEL: CPT | Performed by: DERMATOLOGY

## 2023-03-30 PROCEDURE — 36415 COLL VENOUS BLD VENIPUNCTURE: CPT | Mod: PO | Performed by: DERMATOLOGY

## 2023-03-30 PROCEDURE — 11900 INJECT SKIN LESIONS </W 7: CPT | Mod: S$GLB,,, | Performed by: DERMATOLOGY

## 2023-03-30 RX ORDER — CLOBETASOL PROPIONATE 0.05 G/100ML
SHAMPOO TOPICAL
Qty: 118 ML | Refills: 6 | Status: SHIPPED | OUTPATIENT
Start: 2023-03-30

## 2023-03-30 RX ORDER — SECUKINUMAB 150 MG/ML
INJECTION SUBCUTANEOUS
Qty: 2 ML | Refills: 6 | Status: SHIPPED | OUTPATIENT
Start: 2023-03-30 | End: 2023-04-21 | Stop reason: SDUPTHER

## 2023-03-30 RX ORDER — TAPINAROF 10 MG/1000MG
CREAM TOPICAL
Qty: 60 G | Refills: 6 | Status: SHIPPED | OUTPATIENT
Start: 2023-03-30

## 2023-03-30 RX ORDER — CLOBETASOL PROPIONATE 0.46 MG/ML
SOLUTION TOPICAL
Qty: 50 ML | Refills: 6 | Status: SHIPPED | OUTPATIENT
Start: 2023-03-30

## 2023-03-30 NOTE — PATIENT INSTRUCTIONS
We would like to see you back in the clinic in 6 months.  You will be able to schedule this appointment by calling or by using your [facility] portal 3 months before this time. Because our schedule fills so quickly, please set a reminder in your phone or on your calendar to schedule 3 months before you are due to come in so that we can see you in a timely fashion.  You should also receive a reminder from us in the mail. This will help us ensure we can continue to provide excellent healthcare for you. Thank you.      You have been prescribed a biologic or higher risk medication for your condition.  It is very important that you keep your follow up appointments.  There are labs that must be monitored to ensure your safety on the medication. Furthermore, it is often necessary for clinical improvement to be documented in order for the medication to be continued to be covered by your insurance company.  We strive to follow the standard of care at [facility] and routine follow ups for refills on these mediations are mandated and best for your healthcare.

## 2023-04-03 ENCOUNTER — PATIENT MESSAGE (OUTPATIENT)
Dept: DERMATOLOGY | Facility: CLINIC | Age: 63
End: 2023-04-03
Payer: COMMERCIAL

## 2023-04-03 ENCOUNTER — TELEPHONE (OUTPATIENT)
Dept: DERMATOLOGY | Facility: CLINIC | Age: 63
End: 2023-04-03
Payer: COMMERCIAL

## 2023-04-03 DIAGNOSIS — L40.0 PSORIASIS VULGARIS: ICD-10-CM

## 2023-04-03 DIAGNOSIS — Z79.899 LONG-TERM USE OF HIGH-RISK MEDICATION: ICD-10-CM

## 2023-04-03 RX ORDER — SECUKINUMAB 150 MG/ML
INJECTION SUBCUTANEOUS
Qty: 2 EACH | Refills: 5 | OUTPATIENT
Start: 2023-04-03 | End: 2023-05-04 | Stop reason: SDUPTHER

## 2023-04-04 ENCOUNTER — PATIENT MESSAGE (OUTPATIENT)
Dept: DERMATOLOGY | Facility: CLINIC | Age: 63
End: 2023-04-04
Payer: COMMERCIAL

## 2023-04-10 ENCOUNTER — TELEPHONE (OUTPATIENT)
Dept: DERMATOLOGY | Facility: CLINIC | Age: 63
End: 2023-04-10
Payer: COMMERCIAL

## 2023-04-10 NOTE — TELEPHONE ENCOUNTER
Spoke to pt about her grandson. Explained to pt that he needs to make a portal/account before I can schedule him for virtual visit. Pt thanked me and will call/message back once she does so.

## 2023-04-11 ENCOUNTER — PATIENT MESSAGE (OUTPATIENT)
Dept: DERMATOLOGY | Facility: CLINIC | Age: 63
End: 2023-04-11
Payer: COMMERCIAL

## 2023-04-12 ENCOUNTER — PATIENT MESSAGE (OUTPATIENT)
Dept: DERMATOLOGY | Facility: CLINIC | Age: 63
End: 2023-04-12
Payer: COMMERCIAL

## 2023-04-17 ENCOUNTER — PATIENT MESSAGE (OUTPATIENT)
Dept: DERMATOLOGY | Facility: CLINIC | Age: 63
End: 2023-04-17
Payer: COMMERCIAL

## 2023-04-21 DIAGNOSIS — Z79.899 LONG-TERM USE OF HIGH-RISK MEDICATION: ICD-10-CM

## 2023-04-21 DIAGNOSIS — L40.0 PSORIASIS VULGARIS: ICD-10-CM

## 2023-04-21 RX ORDER — SECUKINUMAB 150 MG/ML
INJECTION SUBCUTANEOUS
Qty: 2 ML | Refills: 6 | Status: SHIPPED | OUTPATIENT
Start: 2023-04-21

## 2023-05-03 ENCOUNTER — TELEPHONE (OUTPATIENT)
Dept: DERMATOLOGY | Facility: CLINIC | Age: 63
End: 2023-05-03
Payer: COMMERCIAL

## 2023-05-03 NOTE — TELEPHONE ENCOUNTER
Msgd pharm to re-fax application.      ----- Message from Pau Anthony RN sent at 4/28/2023 11:36 AM CDT -----  Regarding: FW: Cosentyx Bridge    ----- Message -----  From: Edgardo Beltran PharmD  Sent: 4/28/2023  11:29 AM CDT  To: Ania Escobedo MD, Fanny ATKINSON Staff  Subject: Cosentyx Bridge                                  [facility] Dr. Escobedo and Staff,    Cosentyx was denied by the patient's insurance. We are assisting the patient with applying to the drug 's patient assistance program. I am faxing the application/prescription page to [phone] for your review and signature. Please fax the signed paperwork back to my attention at [phone]. If you would like the paperwork faxed to an alternate number please let me know by replying to this message.    Thank you,    Edgardo Beltran PharmD   Clinical Pharmacist     [facility]   [address] [place] [address] [address]  [place], [place] [address]  Phone: [phone]  Fax: [phone]

## 2023-05-04 DIAGNOSIS — L40.0 PSORIASIS VULGARIS: ICD-10-CM

## 2023-05-04 DIAGNOSIS — Z79.899 LONG-TERM USE OF HIGH-RISK MEDICATION: ICD-10-CM

## 2023-05-04 RX ORDER — SECUKINUMAB 150 MG/ML
INJECTION SUBCUTANEOUS
Qty: 2 EACH | Refills: 5 | Status: SHIPPED | OUTPATIENT
Start: 2023-05-04 | End: 2024-01-17 | Stop reason: SDUPTHER

## 2023-05-22 ENCOUNTER — TELEPHONE (OUTPATIENT)
Dept: PHARMACY | Facility: CLINIC | Age: 63
End: 2023-05-22
Payer: COMMERCIAL

## 2023-05-22 NOTE — TELEPHONE ENCOUNTER
Financial Assistance for Cosentyx is approved from 05/19/2023 for a maximum of 2 years. A PA denial must be submitted every 90 days.    Source: [employer]  Phone: [phone]    Dispensing Pharmacy: [facility]     Patient must contact the patient assistance program to schedule their first shipment of medication. They must also call 7-10 business days before each refill is needed to ensure that they can receive their medication before running out.     Patient notified of their approval by telephone.

## 2023-07-05 ENCOUNTER — TELEPHONE (OUTPATIENT)
Dept: DERMATOLOGY | Facility: CLINIC | Age: 63
End: 2023-07-05
Payer: COMMERCIAL

## 2023-07-05 NOTE — TELEPHONE ENCOUNTER
Spoke with pharm- clarifications made per provider.    ----- Message from Ania Escobedo MD sent at 6/29/2023  4:38 PM CDT -----  Regarding: RE: refill  Contact: Carmen [phone]  Yes ok to dispense  ----- Message -----  From: Shefali Sexton LPN  Sent: 6/29/2023   3:22 PM CDT  To: Ania Escobedo MD  Subject: FW: refill                                       [facility] - OK to dispense medication. States it came from [employer] instead of their normal bridge program.     COSENTYX PEN, 2 PENS, 150 mg/mL PnIj - Inject 300mg (2 pens) SQ q 4 weeks      ----- Message -----  From: Lacy Espinoza LPN  Sent: 6/29/2023   2:23 PM CDT  To: Shefali Sexton LPN  Subject: FW: refill                                         ----- Message -----  From: Tomeka Irene  Sent: 6/29/2023  11:45 AM CDT  To: Fanny ATKINSON Staff  Subject: refill                                           [facility] calling to advise prescription (Cosentyx) was sent to them by unauthorized party and need a validation in order to proceed. Pls call

## 2023-07-05 NOTE — TELEPHONE ENCOUNTER
Spoke with pharm- clarifications made.       ----- Message from Lacy Espinoza LPN sent at 7/3/2023  1:38 PM CDT -----  Regarding: FW: medication  Contact: rosey     ----- Message -----  From: Benito Truong MA  Sent: 7/3/2023  12:15 PM CDT  To: Fanny ATKINSON Staff  Subject: medication                                       Rosey from [facility] [place] to clarify patient  medication for  COSENTYX PEN, 2 PENS, 150 mg/mL PnIj 2 each 5 5/4/2023  Yes  Sig: Inject 300mg (2 pens) SQ q 4 weeks    Please call

## 2023-08-11 ENCOUNTER — PATIENT MESSAGE (OUTPATIENT)
Dept: DERMATOLOGY | Facility: CLINIC | Age: 63
End: 2023-08-11
Payer: COMMERCIAL

## 2023-08-11 DIAGNOSIS — L70.0 ACNE VULGARIS: ICD-10-CM

## 2023-08-14 RX ORDER — CLINDAMYCIN PHOSPHATE 11.9 MG/ML
SOLUTION TOPICAL
Qty: 60 ML | Refills: 3 | Status: SHIPPED | OUTPATIENT
Start: 2023-08-14

## 2023-11-20 NOTE — PROGRESS NOTES
Subjective:          Chief Complaint: Carleen Kincaid is a 61 y.o. female who had concerns including Post-op Evaluation of the Left Knee.    Pt presents for 2 week post-op evaluation s/p procedure below. Pt has no complaints at this time. She is doing PT and progressing as scheduled. Pt is taking pain meds as needed. Denies fever, chills, discharge from wound site, and N/V.    DATE OF PROCEDURE:  04/05/22     ATTENDING SURGEON:  Sri Roberts M.D.     CO-SURGEON:  Jeovany Jauregui M.D. - Resident     FIRST ASSISTANT:  Светлана Agarwal PA-C-assistant.        PREOPERATIVE DIAGNOSIS:  Failed Left total knee replacement.     POSTOPERATIVE DIAGNOSIS:  Failed Left total knee replacement.     PROCEDURES PERFORMED:  Left complex revision total knee replacement.    DATE OF PROCEDURE:  12/12/2017     ATTENDING SURGEON:  Sri Roberts M.D.     CO-SURGEON:  Santiago Qiu M.D.     The complexity of the case required co-surgeon to help with the case. A separate operative report will be dictated by Dr Sri Roberts.     FIRST ASSISTANT:  SMA Sreekanth-assistant.     SECOND ASSISTANT:  Jossue Irene M.D.     PREOPERATIVE DIAGNOSIS:  Failed right total knee replacement.     POSTOPERATIVE DIAGNOSIS:  Failed right total knee replacement.     PROCEDURES PERFORMED:  Right complex revision total knee replacement.      Review of Systems   Constitutional: Negative for chills and fever.   HENT: Negative for congestion and sore throat.    Eyes: Negative for discharge and double vision.   Cardiovascular: Negative for chest pain, palpitations and syncope.   Respiratory: Negative for cough and shortness of breath.    Endocrine: Negative for cold intolerance and heat intolerance.   Skin: Negative for dry skin and rash.   Musculoskeletal: Positive for joint pain and joint swelling.   Gastrointestinal: Negative for abdominal pain, nausea and vomiting.   Neurological: Negative for focal weakness, numbness and paresthesias.       Pain Related  Winlevi Pregnancy And Lactation Text: This medication is considered safe during pregnancy and breastfeeding. Questions  Over the past 3 days, what was your average pain during activity? (I.e. running, jogging, walking, climbing stairs, getting dressed, ect.): 8  Over the past 3 days, what was your highest pain level?: 8  Over the past 3 days, what was your lowest pain level? : 3    Other  How many nights a week are you awakened by your affected body part?: 7  Was the patient's HEIGHT measured or patient reported?: Patient Reported  Was the patient's WEIGHT measured or patient reported?: Measured      Objective:        General: Carleen is well-developed, well-nourished, appears stated age, in no acute distress, alert and oriented to time, place and person.     General    Nursing note and vitals reviewed.  Constitutional: She is oriented to person, place, and time. She appears well-developed and well-nourished. No distress.   Eyes: Conjunctivae and EOM are normal. Pupils are equal, round, and reactive to light.   Neck: Neck supple. No JVD present.   Cardiovascular: Normal heart sounds and intact distal pulses.    No murmur heard.  Pulmonary/Chest: Effort normal and breath sounds normal. No respiratory distress.   Abdominal: Soft. Bowel sounds are normal. She exhibits no distension. There is no abdominal tenderness.   Neurological: She is alert and oriented to person, place, and time. Coordination normal.   Psychiatric: She has a normal mood and affect. Her behavior is normal. Judgment and thought content normal.     General Musculoskeletal Exam   Gait: antalgic       Right Knee Exam     Inspection   Erythema: absent  Scars: absent  Swelling: absent  Effusion: absent  Deformity: absent  Bruising: absent    Range of Motion   Extension: 0   Flexion: 130     Other   Sensation: normal    Left Knee Exam     Inspection   Erythema: absent  Scars: present  Swelling: present  Effusion: absent  Deformity: absent  Bruising: absent    Tenderness   The patient tender to palpation of the condyle.    Range of Motion   Extension: 0     Other  Topical Retinoid counseling:  Patient advised to apply a pea-sized amount only at bedtime and wait 30 minutes after washing their face before applying.  If too drying, patient may add a non-comedogenic moisturizer. The patient verbalized understanding of the proper use and possible adverse effects of retinoids.  All of the patient's questions and concerns were addressed.   Sensation: normal    Comments:  Incision clean/dry/intact  No sign of infection  Mild swelling  Compartments soft  Neurovascular status intact in extremity      Vascular Exam     Right Pulses  Dorsalis Pedis:      2+  Posterior Tibial:      2+        Left Pulses  Dorsalis Pedis:      2+  Posterior Tibial:      2+        Edema  Right Lower Leg: absent  Left Lower Leg: absent      Radiographic Findings 04/22/2022:    Knee two views left: There is a left TKA in place good alignment no complication.    Xrays of the left knee were ordered and reviewed by me today. These findings were discussed and reviewed with the patient.        Assessment:       Encounter Diagnoses   Name Primary?    S/P revision of total knee, left Yes    Edema of left lower extremity           Plan:       1. IKDC, SF-12 and KOOS was not filled out today in clinic.     RTC in 4 weeks with Dr. Sri Roberts. Patient will fill out IKDC, SF-12 and KOOS on return.    2. Provided patient with operative note.  3. Removed sutures.  4. May shower now without covering incisions.  5. Continue  mg once daily and Celebrex 200 mg BID for 4 wks.  Continue Robaxin and Norco.  6. Continue PT per protocol.  7. Jobst stocking Rx today.    All of the patient's questions were answered and the patient will contact us if they have any questions or concerns in the interim.                  [facility] patient questionnaires have been collected today.     Bactrim Pregnancy And Lactation Text: This medication is Pregnancy Category D and is known to cause fetal risk.  It is also excreted in breast milk. Benzoyl Peroxide Counseling: Patient counseled that medicine may cause skin irritation and bleach clothing.  In the event of skin irritation, the patient was advised to reduce the amount of the drug applied or use it less frequently.   The patient verbalized understanding of the proper use and possible adverse effects of benzoyl peroxide.  All of the patient's questions and concerns were addressed. Azithromycin Pregnancy And Lactation Text: This medication is considered safe during pregnancy and is also secreted in breast milk. Topical Sulfur Applications Pregnancy And Lactation Text: This medication is Pregnancy Category C and has an unknown safety profile during pregnancy. It is unknown if this topical medication is excreted in breast milk. Erythromycin Counseling:  I discussed with the patient the risks of erythromycin including but not limited to GI upset, allergic reaction, drug rash, diarrhea, increase in liver enzymes, and yeast infections. Topical Clindamycin Pregnancy And Lactation Text: This medication is Pregnancy Category B and is considered safe during pregnancy. It is unknown if it is excreted in breast milk. Sarecycline Pregnancy And Lactation Text: This medication is Pregnancy Category D and not consider safe during pregnancy. It is also excreted in breast milk. Doxycycline Counseling:  Patient counseled regarding possible photosensitivity and increased risk for sunburn.  Patient instructed to avoid sunlight, if possible.  When exposed to sunlight, patients should wear protective clothing, sunglasses, and sunscreen.  The patient was instructed to call the office immediately if the following severe adverse effects occur:  hearing changes, easy bruising/bleeding, severe headache, or vision changes.  The patient verbalized understanding of the proper use and possible adverse effects of doxycycline.  All of the patient's questions and concerns were addressed. Azelaic Acid Counseling: Patient counseled that medicine may cause skin irritation and to avoid applying near the eyes.  In the event of skin irritation, the patient was advised to reduce the amount of the drug applied or use it less frequently.   The patient verbalized understanding of the proper use and possible adverse effects of azelaic acid.  All of the patient's questions and concerns were addressed. High Dose Vitamin A Pregnancy And Lactation Text: High dose vitamin A therapy is contraindicated during pregnancy and breast feeding. Aklief counseling:  Patient advised to apply a pea-sized amount only at bedtime and wait 30 minutes after washing their face before applying.  If too drying, patient may add a non-comedogenic moisturizer.  The most commonly reported side effects including irritation, redness, scaling, dryness, stinging, burning, itching, and increased risk of sunburn.  The patient verbalized understanding of the proper use and possible adverse effects of retinoids.  All of the patient's questions and concerns were addressed. Tazorac Pregnancy And Lactation Text: This medication is not safe during pregnancy. It is unknown if this medication is excreted in breast milk. Dapsone Counseling: I discussed with the patient the risks of dapsone including but not limited to hemolytic anemia, agranulocytosis, rashes, methemoglobinemia, kidney failure, peripheral neuropathy, headaches, GI upset, and liver toxicity.  Patients who start dapsone require monitoring including baseline LFTs and weekly CBCs for the first month, then every month thereafter.  The patient verbalized understanding of the proper use and possible adverse effects of dapsone.  All of the patient's questions and concerns were addressed. Tetracycline Counseling: Patient counseled regarding possible photosensitivity and increased risk for sunburn.  Patient instructed to avoid sunlight, if possible.  When exposed to sunlight, patients should wear protective clothing, sunglasses, and sunscreen.  The patient was instructed to call the office immediately if the following severe adverse effects occur:  hearing changes, easy bruising/bleeding, severe headache, or vision changes.  The patient verbalized understanding of the proper use and possible adverse effects of tetracycline.  All of the patient's questions and concerns were addressed. Patient understands to avoid pregnancy while on therapy due to potential birth defects. Topical Retinoid Pregnancy And Lactation Text: This medication is Pregnancy Category C. It is unknown if this medication is excreted in breast milk. Birth Control Pills Counseling: Birth Control Pill Counseling: I discussed with the patient the potential side effects of OCPs including but not limited to increased risk of stroke, heart attack, thrombophlebitis, deep venous thrombosis, hepatic adenomas, breast changes, GI upset, headaches, and depression.  The patient verbalized understanding of the proper use and possible adverse effects of OCPs. All of the patient's questions and concerns were addressed. Isotretinoin Pregnancy And Lactation Text: This medication is Pregnancy Category X and is considered extremely dangerous during pregnancy. It is unknown if it is excreted in breast milk. Include Pregnancy/Lactation Warning?: No Winlevi Counseling:  I discussed with the patient the risks of topical clascoterone including but not limited to erythema, scaling, itching, and stinging. Patient voiced their understanding. Erythromycin Pregnancy And Lactation Text: This medication is Pregnancy Category B and is considered safe during pregnancy. It is also excreted in breast milk. Bactrim Counseling:  I discussed with the patient the risks of sulfa antibiotics including but not limited to GI upset, allergic reaction, drug rash, diarrhea, dizziness, photosensitivity, and yeast infections.  Rarely, more serious reactions can occur including but not limited to aplastic anemia, agranulocytosis, methemoglobinemia, blood dyscrasias, liver or kidney failure, lung infiltrates or desquamative/blistering drug rashes. Benzoyl Peroxide Pregnancy And Lactation Text: This medication is Pregnancy Category C. It is unknown if benzoyl peroxide is excreted in breast milk. Spironolactone Counseling: Patient advised regarding risks of diarrhea, abdominal pain, hyperkalemia, birth defects (for female patients), liver toxicity and renal toxicity. The patient may need blood work to monitor liver and kidney function and potassium levels while on therapy. The patient verbalized understanding of the proper use and possible adverse effects of spironolactone.  All of the patient's questions and concerns were addressed. Sarecycline Counseling: Patient advised regarding possible photosensitivity and discoloration of the teeth, skin, lips, tongue and gums.  Patient instructed to avoid sunlight, if possible.  When exposed to sunlight, patients should wear protective clothing, sunglasses, and sunscreen.  The patient was instructed to call the office immediately if the following severe adverse effects occur:  hearing changes, easy bruising/bleeding, severe headache, or vision changes.  The patient verbalized understanding of the proper use and possible adverse effects of sarecycline.  All of the patient's questions and concerns were addressed. Doxycycline Pregnancy And Lactation Text: This medication is Pregnancy Category D and not consider safe during pregnancy. It is also excreted in breast milk but is considered safe for shorter treatment courses. Topical Sulfur Applications Counseling: Topical Sulfur Counseling: Patient counseled that this medication may cause skin irritation or allergic reactions.  In the event of skin irritation, the patient was advised to reduce the amount of the drug applied or use it less frequently.   The patient verbalized understanding of the proper use and possible adverse effects of topical sulfur application.  All of the patient's questions and concerns were addressed. Azelaic Acid Pregnancy And Lactation Text: This medication is considered safe during pregnancy and breast feeding. Azithromycin Counseling:  I discussed with the patient the risks of azithromycin including but not limited to GI upset, allergic reaction, drug rash, diarrhea, and yeast infections. Aklief Pregnancy And Lactation Text: It is unknown if this medication is safe to use during pregnancy.  It is unknown if this medication is excreted in breast milk.  Breastfeeding women should use the topical cream on the smallest area of the skin for the shortest time needed while breastfeeding.  Do not apply to nipple and areola. Topical Clindamycin Counseling: Patient counseled that this medication may cause skin irritation or allergic reactions.  In the event of skin irritation, the patient was advised to reduce the amount of the drug applied or use it less frequently.   The patient verbalized understanding of the proper use and possible adverse effects of clindamycin.  All of the patient's questions and concerns were addressed. Dapsone Pregnancy And Lactation Text: This medication is Pregnancy Category C and is not considered safe during pregnancy or breast feeding. Tazorac Counseling:  Patient advised that medication is irritating and drying.  Patient may need to apply sparingly and wash off after an hour before eventually leaving it on overnight.  The patient verbalized understanding of the proper use and possible adverse effects of tazorac.  All of the patient's questions and concerns were addressed. Minocycline Counseling: Patient advised regarding possible photosensitivity and discoloration of the teeth, skin, lips, tongue and gums.  Patient instructed to avoid sunlight, if possible.  When exposed to sunlight, patients should wear protective clothing, sunglasses, and sunscreen.  The patient was instructed to call the office immediately if the following severe adverse effects occur:  hearing changes, easy bruising/bleeding, severe headache, or vision changes.  The patient verbalized understanding of the proper use and possible adverse effects of minocycline.  All of the patient's questions and concerns were addressed. Detail Level: Zone Birth Control Pills Pregnancy And Lactation Text: This medication should be avoided if pregnant and for the first 30 days post-partum. High Dose Vitamin A Counseling: Side effects reviewed, pt to contact office should one occur. Isotretinoin Counseling: Patient should get monthly blood tests, not donate blood, not drive at night if vision affected, not share medication, and not undergo elective surgery for 6 months after tx completed. Side effects reviewed, pt to contact office should one occur. Spironolactone Pregnancy And Lactation Text: This medication can cause feminization of the male fetus and should be avoided during pregnancy. The active metabolite is also found in breast milk.

## 2024-01-17 ENCOUNTER — PATIENT MESSAGE (OUTPATIENT)
Dept: DERMATOLOGY | Facility: CLINIC | Age: 64
End: 2024-01-17
Payer: COMMERCIAL

## 2024-01-17 DIAGNOSIS — L40.0 PSORIASIS VULGARIS: ICD-10-CM

## 2024-01-17 DIAGNOSIS — Z79.899 LONG-TERM USE OF HIGH-RISK MEDICATION: ICD-10-CM

## 2024-01-17 RX ORDER — SECUKINUMAB 150 MG/ML
INJECTION SUBCUTANEOUS
Qty: 2 EACH | Refills: 0 | Status: SHIPPED | OUTPATIENT
Start: 2024-01-17 | End: 2024-01-17

## 2024-01-17 RX ORDER — SECUKINUMAB 150 MG/ML
INJECTION SUBCUTANEOUS
Qty: 2 EACH | Refills: 0 | Status: SHIPPED | OUTPATIENT
Start: 2024-01-17 | End: 2024-02-07 | Stop reason: SDUPTHER

## 2024-01-17 NOTE — TELEPHONE ENCOUNTER
----- Message from Rita Mcdonald MA sent at 1/9/2024  4:51 PM CST -----  Regarding: FW: Refill/Appt  Contact: pt [phone]    ----- Message -----  From: Gloria Hernandez  Sent: 1/9/2024   1:18 PM CST  To: Fanny ATKINSON Staff  Subject: Refill/Appt                                      Rx Refill/Request    Is this a Refill or New Rx:  refill    Rx Name and Strength:   -  COSENTYX PEN, 2 PENS, 150 mg/mL PnIj 2 each 5    Preferred Pharmacy with phone number:  [facility]  [address] [place] [address] [phone]    Communication Preference: please call pt [phone] if needed     Additional Information: pt is due for refill as well as follow up

## 2024-02-07 DIAGNOSIS — L40.0 PSORIASIS VULGARIS: ICD-10-CM

## 2024-02-07 DIAGNOSIS — Z79.899 LONG-TERM USE OF HIGH-RISK MEDICATION: ICD-10-CM

## 2024-02-08 RX ORDER — SECUKINUMAB 150 MG/ML
INJECTION SUBCUTANEOUS
Qty: 2 EACH | Refills: 0 | Status: SHIPPED | OUTPATIENT
Start: 2024-02-08 | End: 2024-04-04 | Stop reason: SDUPTHER

## 2024-02-09 DIAGNOSIS — Z79.899 LONG-TERM USE OF HIGH-RISK MEDICATION: ICD-10-CM

## 2024-02-09 DIAGNOSIS — L40.0 PSORIASIS VULGARIS: ICD-10-CM

## 2024-02-19 ENCOUNTER — PATIENT MESSAGE (OUTPATIENT)
Dept: DERMATOLOGY | Facility: CLINIC | Age: 64
End: 2024-02-19
Payer: COMMERCIAL

## 2024-02-19 RX ORDER — SECUKINUMAB 150 MG/ML
INJECTION SUBCUTANEOUS
Qty: 2 EACH | Refills: 0 | Status: ACTIVE | OUTPATIENT
Start: 2024-02-19

## 2024-04-04 ENCOUNTER — OFFICE VISIT (OUTPATIENT)
Dept: DERMATOLOGY | Facility: CLINIC | Age: 64
End: 2024-04-04
Payer: COMMERCIAL

## 2024-04-04 ENCOUNTER — LAB VISIT (OUTPATIENT)
Dept: LAB | Facility: HOSPITAL | Age: 64
End: 2024-04-04
Attending: DERMATOLOGY
Payer: COMMERCIAL

## 2024-04-04 DIAGNOSIS — L40.0 PSORIASIS VULGARIS: Primary | ICD-10-CM

## 2024-04-04 DIAGNOSIS — Z79.899 LONG-TERM USE OF HIGH-RISK MEDICATION: ICD-10-CM

## 2024-04-04 DIAGNOSIS — D18.01 CHERRY ANGIOMA: ICD-10-CM

## 2024-04-04 DIAGNOSIS — L73.8 SEBACEOUS HYPERPLASIA: ICD-10-CM

## 2024-04-04 DIAGNOSIS — L82.1 SK (SEBORRHEIC KERATOSIS): ICD-10-CM

## 2024-04-04 DIAGNOSIS — D22.9 NEVUS: ICD-10-CM

## 2024-04-04 DIAGNOSIS — L40.0 PSORIASIS VULGARIS: ICD-10-CM

## 2024-04-04 PROCEDURE — 3288F FALL RISK ASSESSMENT DOCD: CPT | Mod: CPTII,S$GLB,, | Performed by: DERMATOLOGY

## 2024-04-04 PROCEDURE — 86480 TB TEST CELL IMMUN MEASURE: CPT | Performed by: DERMATOLOGY

## 2024-04-04 PROCEDURE — 11900 INJECT SKIN LESIONS </W 7: CPT | Mod: S$GLB,,, | Performed by: DERMATOLOGY

## 2024-04-04 PROCEDURE — 1125F AMNT PAIN NOTED PAIN PRSNT: CPT | Mod: CPTII,S$GLB,, | Performed by: DERMATOLOGY

## 2024-04-04 PROCEDURE — 1159F MED LIST DOCD IN RCRD: CPT | Mod: CPTII,S$GLB,, | Performed by: DERMATOLOGY

## 2024-04-04 PROCEDURE — 99999 PR PBB SHADOW E&M-EST. PATIENT-LVL III: CPT | Mod: PBBFAC,,, | Performed by: DERMATOLOGY

## 2024-04-04 PROCEDURE — 99214 OFFICE O/P EST MOD 30 MIN: CPT | Mod: 25,S$GLB,, | Performed by: DERMATOLOGY

## 2024-04-04 PROCEDURE — 4010F ACE/ARB THERAPY RXD/TAKEN: CPT | Mod: CPTII,S$GLB,, | Performed by: DERMATOLOGY

## 2024-04-04 PROCEDURE — 1101F PT FALLS ASSESS-DOCD LE1/YR: CPT | Mod: CPTII,S$GLB,, | Performed by: DERMATOLOGY

## 2024-04-04 RX ORDER — SECUKINUMAB 150 MG/ML
INJECTION SUBCUTANEOUS
Qty: 2 EACH | Refills: 5 | Status: ACTIVE | OUTPATIENT
Start: 2024-04-04

## 2024-04-04 NOTE — PROGRESS NOTES
"  Subjective:      Patient ID:  Carleen Kincaid is a 63 y.o. female who presents for   Chief Complaint   Patient presents with    Skin Check     tbse     Patient is here today for a "mole" check.     Pt has a history of  moderate sun exposure in the past.   Pt recalls several blistering sunburns in the past- yes, 5yrs ago  Pt has history of tanning bed use- yes, 4yrs ago  Pt has  had moles removed in the past- no  Pt has history of melanoma in first degree relatives-  Three brothers    Pt denies any new changing, bleeding or growing lesions today.    Pt also needs refill on cosentyx 300mg - last inj beginning of March 2024, Flaring above ears.  Pt denies any new side effects but does have flare on left scalp she would like injected.    She is having a colonoscopy tomorrow.              Review of Systems   Constitutional:  Negative for fever, chills, weight loss and weight gain.   HENT:  Negative for mouth sores (no tongue whiteness).    Respiratory:  Negative for shortness of breath.    Gastrointestinal:  Negative for nausea, vomiting, abdominal pain and diarrhea.   Musculoskeletal:  Negative for joint swelling and arthralgias.   Skin:  Positive for itching, rash (but no rashes/itching in folds), daily sunscreen use and activity-related sunscreen use. Negative for recent sunburn.        Injection site reaction - no   yes   Psychiatric/Behavioral:  Negative for depressed mood.    Hematologic/Lymphatic: Bruises/bleeds easily (bruise).       Objective:   Physical Exam   Constitutional: She appears well-developed and well-nourished. No distress.   Neurological: She is alert and oriented to person, place, and time. She is not disoriented.   Psychiatric: She has a normal mood and affect.   Skin:   Areas Examined (abnormalities noted in diagram):   Scalp / Hair Palpated and Inspected  Head / Face Inspection Performed  Neck Inspection Performed  Chest / Axilla Inspection Performed  Abdomen Inspection Performed  Genitals / " Buttocks / Groin Inspection Performed  Back Inspection Performed  RUE Inspected  LUE Inspection Performed  RLE Inspected  LLE Inspection Performed  Nails and Digits Inspection Performed                     Diagram Legend     Erythematous scaling macule/papule c/w actinic keratosis       Vascular papule c/w angioma      Pigmented verrucoid papule/plaque c/w seborrheic keratosis      Yellow umbilicated papule c/w sebaceous hyperplasia      Irregularly shaped tan macule c/w lentigo     1-2 mm smooth white papules consistent with Milia      Movable subcutaneous cyst with punctum c/w epidermal inclusion cyst      Subcutaneous movable cyst c/w pilar cyst      Firm pink to brown papule c/w dermatofibroma      Pedunculated fleshy papule(s) c/w skin tag(s)      Evenly pigmented macule c/w junctional nevus     Mildly variegated pigmented, slightly irregular-bordered macule c/w mildly atypical nevus      Flesh colored to evenly pigmented papule c/w intradermal nevus       Pink pearly papule/plaque c/w basal cell carcinoma      Erythematous hyperkeratotic cursted plaque c/w SCC      Surgical scar with no sign of skin cancer recurrence      Open and closed comedones      Inflammatory papules and pustules      Verrucoid papule consistent consistent with wart     Erythematous eczematous patches and plaques     Dystrophic onycholytic nail with subungual debris c/w onychomycosis     Umbilicated papule    Erythematous-base heme-crusted tan verrucoid plaque consistent with inflamed seborrheic keratosis     Erythematous Silvery Scaling Plaque c/w Psoriasis     See annotation      Assessment / Plan:        Psoriasis vulgaris  -     Long-term use of high-risk medication  -     CBC Auto Differential; Future; Expected date: 04/04/2024  -     Comprehensive Metabolic Panel; Future; Expected date: 04/04/2024  -     Quantiferon Gold TB; Future; Expected date: 04/04/2024  -     AL [id number] ACETONIDE INJ PER 10MG  -     triamcinolone  acetonide injection 10 mg  -     AL INJECTION INTO SKIN LESIONS, UP TO 7  -     COSENTYX PEN, 2 PENS, 150 mg/mL PnIj; Inject 300mg (2 pens) SQ q 4 weeks  Dispense: 2 each; Refill: 5      Discussed risks of infection, especially  yeast and fungal infections, avoid live vaccines, need for TB screenings, hypersensitivity reactions. Pt with no hx of Chron's disease.   Continue cosentyx 300 mg subq q 4 weeks    Lab Results   Component Value Date    WBC 9.7 09/05/2023    HGB 14.0 09/05/2023    HCT 43.6 09/05/2023    MCV 88.3 09/05/2023     09/05/2023       CMP  Sodium   Date Value Ref Range Status   03/30/2023 140 136 - 145 mmol/L Final     Potassium   Date Value Ref Range Status   03/30/2023 4.7 3.5 - 5.1 mmol/L Final     Chloride   Date Value Ref Range Status   03/30/2023 105 95 - 110 mmol/L Final     CO2   Date Value Ref Range Status   03/30/2023 25 23 - 29 mmol/L Final     Glucose   Date Value Ref Range Status   03/30/2023 85 70 - 110 mg/dL Final     BUN   Date Value Ref Range Status   03/30/2023 18 8 - 23 mg/dL Final     Creatinine   Date Value Ref Range Status   03/30/2023 0.7 0.5 - 1.4 mg/dL Final     Calcium   Date Value Ref Range Status   03/30/2023 10.0 8.7 - 10.5 mg/dL Final     Total Protein   Date Value Ref Range Status   03/30/2023 7.3 6.0 - 8.4 g/dL Final     Albumin   Date Value Ref Range Status   03/30/2023 4.3 3.5 - 5.2 g/dL Final     Total Bilirubin   Date Value Ref Range Status   03/30/2023 0.4 0.1 - 1.0 mg/dL Final     Comment:     For infants and newborns, interpretation of results should be based  on gestational age, weight and in agreement with clinical  observations.    Premature Infant recommended reference ranges:  Up to 24 hours.............<8.0 mg/dL  Up to 48 hours............<12.0 mg/dL  3-5 days..................<15.0 mg/dL  6-29 days.................<15.0 mg/dL       Alkaline Phosphatase   Date Value Ref Range Status   03/30/2023 133 55 - 135 U/L Final     AST   Date Value Ref Range  Status   03/30/2023 22 10 - 40 U/L Final     ALT   Date Value Ref Range Status   03/30/2023 24 10 - 44 U/L Final     Anion Gap   Date Value Ref Range Status   03/30/2023 10 8 - 16 mmol/L Final     eGFR   Date Value Ref Range Status   03/30/2023 >60.0 >60 mL/min/1.73 m^2 Final     Intralesional Kenalog 10.0mg/cc (0.5 cc total) injected into 1 lesions on the left postauricular scalp  today after obtaining verbal consent including risk of surrounding hypopigmentation. Patient tolerated procedure well.  Units: 1.0  NDC for Kenalog 10mg/cc:  1303-6026-95    Cherry angioma  These are benign vascular lesions that are inherited.  Treatment is not necessary.    Nevus  Discussed ABCDE's of nevi.  Monitor for new mole or moles that are becoming bigger, darker, irritated, or developing irregular borders. Brochure provided. Instructed patient to observe lesion(s) for changes and follow up in clinic if changes are noted. Patient to monitor skin at home for new or changing lesions.     SK (seborrheic keratosis)  These are benign inherited growths without a malignant potential. Reassurance given to patient. No treatment is necessary.     Sebaceous hyperplasia  This is a common condition representing benign enlargement of the sebaceous lobule. It typically occurs in adulthood. Reassurance given to patient.       Total body skin examination performed today including at least 12 points as noted in physical examination. No lesions suspicious for malignancy noted.    Recommend daily sun protection/avoidance, use of at least SPF 30, broad spectrum sunscreen (OTC drug), skin self examinations, and routine physician surveillance to optimize early detection           Follow up in about 6 months (around 10/4/2024) for Medication Management.

## 2024-04-04 NOTE — PATIENT INSTRUCTIONS
Discussed risks of infection, especially  yeast and fungal infections, avoid live vaccines, need for TB screenings, hypersensitivity reactions. Pt with no hx of Chron's disease.   Will start Cosentyx at dose of 300mg given by 2- 150 mg prefilled syringes by SQ injection at week 0, 1, 2, 3, 4 then q month.    Will check CBC 1 month after starting medication and then q3-6 months.    You have been prescribed a biologic or higher risk medication for your condition.  It is very important that you keep your follow up appointments.  There are labs that must be monitored to ensure your safety on the medication. Furthermore, it is often necessary for clinical improvement to be documented in order for the medication to be continued to be covered by your insurance company.  We strive to follow the standard of care at [facility] and routine follow ups for refills on these mediations are mandated and best for your healthcare.     We would like to see you back in the clinic in 6 months.  You will be able to schedule this appointment by calling or by using your [facility] portal 3 months before this time. Because our schedule fills so quickly, please set a reminder in your phone or on your calendar to schedule 3 months before you are due to come in so that we can see you in a timely fashion.  You should also receive a reminder from us in the mail. This will help us ensure we can continue to provide excellent healthcare for you. Thank you.

## 2024-04-05 LAB
GAMMA INTERFERON BACKGROUND BLD IA-ACNC: 0.03 IU/ML
M TB IFN-G CD4+ BCKGRND COR BLD-ACNC: -0 IU/ML
M TB IFN-G CD4+ BCKGRND COR BLD-ACNC: -0.01 IU/ML
MITOGEN IGNF BCKGRD COR BLD-ACNC: 9.97 IU/ML
TB GOLD PLUS: NEGATIVE

## 2024-10-30 ENCOUNTER — OFFICE VISIT (OUTPATIENT)
Dept: SPORTS MEDICINE | Facility: CLINIC | Age: 64
End: 2024-10-30
Payer: MEDICARE

## 2024-10-30 ENCOUNTER — HOSPITAL ENCOUNTER (OUTPATIENT)
Dept: RADIOLOGY | Facility: HOSPITAL | Age: 64
Discharge: HOME | End: 2024-10-30
Attending: PHYSICIAN ASSISTANT
Payer: MEDICARE

## 2024-10-30 VITALS
HEART RATE: 71 BPM | SYSTOLIC BLOOD PRESSURE: 112 MMHG | HEIGHT: 62 IN | WEIGHT: 165 LBS | BODY MASS INDEX: 30.36 KG/M2 | DIASTOLIC BLOOD PRESSURE: 77 MMHG

## 2024-10-30 DIAGNOSIS — L40.50 PSORIATIC ARTHRITIS: ICD-10-CM

## 2024-10-30 DIAGNOSIS — Z96.653 STATUS POST TOTAL BILATERAL KNEE REPLACEMENT: ICD-10-CM

## 2024-10-30 DIAGNOSIS — M25.552 LEFT HIP PAIN: Primary | ICD-10-CM

## 2024-10-30 DIAGNOSIS — M25.552 LEFT HIP PAIN: ICD-10-CM

## 2024-10-30 PROCEDURE — 73521 X-RAY EXAM HIPS BI 2 VIEWS: CPT | Mod: TC

## 2024-10-30 PROCEDURE — 73564 X-RAY EXAM KNEE 4 OR MORE: CPT | Mod: TC,50

## 2024-10-30 PROCEDURE — 73521 X-RAY EXAM HIPS BI 2 VIEWS: CPT | Mod: 26,,, | Performed by: RADIOLOGY

## 2024-10-30 PROCEDURE — 99999 PR PBB SHADOW E&M-EST. PATIENT-LVL V: CPT | Mod: PBBFAC,,, | Performed by: PHYSICIAN ASSISTANT

## 2024-10-30 PROCEDURE — 73564 X-RAY EXAM KNEE 4 OR MORE: CPT | Mod: 26,50,, | Performed by: RADIOLOGY

## 2024-10-30 PROCEDURE — 99215 OFFICE O/P EST HI 40 MIN: CPT | Mod: PBBFAC,25 | Performed by: PHYSICIAN ASSISTANT

## 2024-10-30 PROCEDURE — 99213 OFFICE O/P EST LOW 20 MIN: CPT | Mod: S$PBB,,, | Performed by: PHYSICIAN ASSISTANT

## 2024-11-04 ENCOUNTER — OFFICE VISIT (OUTPATIENT)
Dept: NEUROSURGERY | Facility: CLINIC | Age: 64
End: 2024-11-04
Payer: MEDICARE

## 2024-11-04 VITALS
SYSTOLIC BLOOD PRESSURE: 116 MMHG | WEIGHT: 165 LBS | TEMPERATURE: 97 F | BODY MASS INDEX: 30.67 KG/M2 | DIASTOLIC BLOOD PRESSURE: 81 MMHG

## 2024-11-04 DIAGNOSIS — M48.02 CERVICAL STENOSIS OF SPINAL CANAL: ICD-10-CM

## 2024-11-04 DIAGNOSIS — M54.6 PAIN IN THORACIC SPINE: ICD-10-CM

## 2024-11-04 DIAGNOSIS — M54.16 LUMBAR RADICULOPATHY: ICD-10-CM

## 2024-11-04 DIAGNOSIS — M48.02 SPINAL STENOSIS, CERVICAL REGION: ICD-10-CM

## 2024-11-04 DIAGNOSIS — M54.9 DORSALGIA, UNSPECIFIED: Primary | ICD-10-CM

## 2024-11-04 DIAGNOSIS — Z98.1 HX OF SPINAL FUSION: ICD-10-CM

## 2024-11-04 PROCEDURE — 99999 PR PBB SHADOW E&M-EST. PATIENT-LVL V: CPT | Mod: PBBFAC,,, | Performed by: NURSE PRACTITIONER

## 2024-11-04 PROCEDURE — 99213 OFFICE O/P EST LOW 20 MIN: CPT | Mod: S$PBB,,, | Performed by: NURSE PRACTITIONER

## 2024-11-04 PROCEDURE — 99215 OFFICE O/P EST HI 40 MIN: CPT | Mod: PBBFAC | Performed by: NURSE PRACTITIONER

## 2024-11-04 NOTE — PROGRESS NOTES
Neurosurgery  Established Patient    SUBJECTIVE:     History of Present Illness (per Марина Hanna PA-C note): Carleen Kincaid is a 61 y.o. female with past history of neurogenic claudication s/p L4-5 posterior fusion in 2014 who was referred to me by Merrill Godinez PA-C with Orthopedics. She has an extensive orthopedic history, including bilateral knee replacements with most recently failed left knee replacement. She is scheduled for a left knee arthroplasty revision in early April. She is here for complaints of returned lower back pain as well as leg pain that began about 2 years ago. She initially underwent an L4-5 posterior fusion for neurogenic claudication and spondylolisthesis in 2014 by Dr. Richard Sanchez in [place], [place]. Her symptoms improved post operatively. About 2 years ago she had return of her lower back pain and pain that radiates down the posterior aspect of bilateral legs. Pain in the legs is exacerbated by walking for long periods. It is relieved by leaning forward and sitting. She endorses several episodes of loss of control of her bowels and bladder in the past month. She denies any focal weakness or saddle anesthesia. She has not tried physical therapy or pain management      Interval Hx 5/18/22: After the last appointment with Марина Hanna, it was recommended that the patient obtain a MRI to evaluate her chronic back pain. The patient is being seen today in clinic to review the image findings. States that some of her symptoms have improved since her recent left knee arthroplasty. She is able to stand and walk for longer periods. She is doing PT. She does continue to have pain in the low back.     Interval Hx 11/4/24: The patient is being seen in clinic today to discuss concerns with worsening low back and left hip/leg pain. States that she obtained the injection a couple of years ago with significant relief. She is concerned that her left leg is becoming weaker and numb. Rates  the pain as a 3/10. Describes the pain as constant and aching. Aggravating factors include standing, walking, and bending. Alleviating factors include rest. Denies b/b dysfunction or gait instability. Reports intermittent tingling in her groin and stabbing pain. Lastly, she reports neck and upper back pain.     Review of patient's allergies indicates:  No Known Allergies    Current Outpatient Medications   Medication Sig Dispense Refill    acetaminophen (TYLENOL ORAL) Take by mouth.      calcipotriene 0.005 % sclp soln aaa qd- bid prn flare 60 mL 6    calcipotriene-betamethasone (TACLONEX SCALP) external suspension Apply topically once daily. 60 g 1    cholecalciferol, vitamin D3, (VITAMIN D3) 25 mcg (1,000 unit) capsule Take 1,000 Units by mouth once daily.      clindamycin (CLEOCIN T) 1 % external solution AAA bid 60 mL 3    clobetasoL (CLOBEX) 0.05 % shampoo Apply to scalp, lather, let sit 5 minutes then rinse q week prn flare 118 mL 6    clobetasoL (TEMOVATE) 0.05 % external solution APPLY TOPICALLY TO THE SCALP 1 TO 2 TIMES DAILY AS NEEDED FOR SCALING OR ITCHING 50 mL 2    clobetasoL (TEMOVATE) 0.05 % external solution Aaa on scalp qd- bid prn itching/scaling 50 mL 6    COSENTYX PEN, 2 PENS, 150 mg/mL PnIj Inject 300mg SQ qweek x 5 weeks then inject 300mg SQ q 4 weeks 10 each 0    COSENTYX PEN, 2 PENS, 150 mg/mL PnIj Inject 300mg (2 pens) SQ q 4 weeks 2 mL 6    COSENTYX PEN, 2 PENS, 150 mg/mL PnIj Inject 300mg (2 pens) SQ q 4 weeks 2 each 0    COSENTYX PEN, 2 PENS, 150 mg/mL PnIj Inject 300mg (2 pens) SQ q 4 weeks 2 each 5    cyanocobalamin, vitamin B-12, (VITAMIN B-12 ORAL) Take by mouth.      diclofenac (VOLTAREN) 75 MG EC tablet Take 75 mg by mouth 2 (two) times daily.      ergocalciferol (ERGOCALCIFEROL) 50,000 unit Cap   1    lisinopriL (PRINIVIL,ZESTRIL) 20 MG tablet Take 20 mg by mouth once daily.      methocarbamoL (ROBAXIN) 500 MG Tab Take 1 tablet (500 mg total) by mouth 3 (three) times daily as  needed (muscle spasms). 40 tablet 0    multivitamin (THERAGRAN) per tablet Take 1 tablet by mouth once daily.      rosuvastatin (CRESTOR) 10 MG tablet Take 10 mg by mouth once daily.      sumatriptan (IMITREX) 100 MG tablet Take 100 mg by mouth.      tapinarof (VTAMA) 1 % Crea Apply thin film to affected area qd 60 g 6    traZODone (DESYREL) 50 MG tablet Take 50 mg by mouth nightly as needed.      ascorbic acid, vitamin C, (VITAMIN C) 500 MG tablet Take 500 mg by mouth once daily.       Current Facility-Administered Medications   Medication Dose Route Frequency Provider Last Rate Last Admin    triamcinolone acetonide injection 10 mg  10 mg Intradermal Once Ania Escobedo MD        triamcinolone acetonide injection 10 mg  10 mg Intradermal Once Ania Escobedo MD           Past Medical History:   Diagnosis Date    Anxiety     Degenerative disc disease, cervical     Depression     General anesthetics causing adverse effect in therapeutic use     NO DEMEROL    Hyperlipidemia     Hypertension     Internal derangement of right knee 2/13/2017    Joint pain 11/12/2014    Kidney stones     Knee pain 2/13/2017    Psoriasis 1983    Right knee pain 11/16/2015    Stroke     retinal stroke     Past Surgical History:   Procedure Laterality Date    APPENDECTOMY      EPIDURAL STEROID INJECTION N/A 7/13/2022    Procedure: Injection, Steroid, Epidural;  Surgeon: Shaka Caldwell MD;  Location: [facility] OR;  Service: Pain Management;  Laterality: N/A;  L3-4 (dr Gonzalez)     HYSTERECTOMY      partial    JOINT REPLACEMENT Bilateral     Knees    KNEE ARTHROSCOPY Right 8/27/2015    Dr Roberts    lithtotrispy      LUMBAR SPINE SURGERY  10/2014    L4-L5 fusion    REVISION OF KNEE ARTHROPLASTY Left 4/5/2022    Procedure: REVISION, ARTHROPLASTY, KNEE;  Surgeon: Sri Roberts MD;  Location: [facility] OR;  Service: Orthopedics;  Laterality: Left;  general, regional w catheter, adductor, epidural, spinal, ester 50cc     Family History       Problem Relation  (Age of Onset)    Bipolar disorder Daughter    Cancer Mother (55), Father (82), Brother, Brother, Brother    Diabetes Mellitus Brother, Brother, Brother    Heart disease Brother, Brother, Brother    Kidney disease Daughter    Melanoma Brother, Brother, Brother    Paget's disease of bone Mother    Psoriasis Father, Maternal Grandmother    Thyroid disease Mother          Social History     Socioeconomic History    Marital status:      Spouse name: Jose    Number of children: 3   Tobacco Use    Smoking status: Never    Smokeless tobacco: Never    Tobacco comments:     ; ; 3 children   Substance and Sexual Activity    Alcohol use: Yes     Comment: rarely    Drug use: No   Social History Narrative    Stairs-      Social Drivers of Health     Financial Resource Strain: Low Risk  (4/4/2024)    Overall Financial Resource Strain (CARDIA)     Difficulty of Paying Living Expenses: Not very hard   Food Insecurity: No Food Insecurity (4/4/2024)    Hunger Vital Sign     Worried About Running Out of Food in the Last Year: Never true     Ran Out of Food in the Last Year: Never true   Transportation Needs: No Transportation Needs (4/4/2024)    PRAPARE - Transportation     Lack of Transportation (Medical): No     Lack of Transportation (Non-Medical): No   Physical Activity: Unknown (4/4/2024)    Exercise Vital Sign     Days of Exercise per Week: 3 days   Stress: Stress Concern Present (4/4/2024)    [employer] - Occupational Stress Questionnaire     Feeling of Stress : To some extent   Housing Stability: Low Risk  (4/4/2024)    Housing Stability Vital Sign     Unable to Pay for Housing in the Last Year: No     Number of Places Lived in the Last Year: 1     Unstable Housing in the Last Year: No       Review of Systems    OBJECTIVE:     Vital Signs  Temp: 97.2 °F (36.2 °C)  Pulse: (P) 80  BP: 116/81  Pain Score:   3  Weight: 74.8 kg (165 lb)  Body mass index is 30.67  kg/m².    Neurosurgery Physical Exam  General: well developed, well nourished, no distress.   Head: normocephalic, atraumatic  Neurologic: Alert and oriented. Thought content appropriate.  GCS: Motor: 6/Verbal: 5/Eyes: 4 GCS Total: 15  Mental Status: Awake, Alert, Oriented x 4  Language: No aphasia  Speech: No dysarthria  Cranial nerves: face symmetric, tongue midline, CN II-XII grossly intact.   Eyes: pupils equal, round, reactive to light with accomodation, EOMI.   Pulmonary: normal respirations, no signs of respiratory distress  Abdomen: soft, non-distended  Skin: Skin is warm, dry and intact.  Sensory: intact to light touch throughout  Motor Strength:Moves all extremities spontaneously with good tone.      Strength  Deltoids Triceps Biceps Wrist Extension Wrist Flexion Hand    Upper: R 5/5 5/5 5/5 5/5 5/5 5/5    L 5/5 5/5 5/5 5/5 5/5 5/5     HF KE KF DF PF EHL   Lower: R 5/5 5/5 5/5 5/5 5/5 5/5    L 4-/5 4-/5 4-/5 5/5 5/5 5/5     Reflexes:   DTR: 2+ symmetrically throughout.  Brown's: Negative.     Cerebellar:   Gait stable, fluid.   Tandem Gait: Loss of balance     Cervical:   ROM: Pain limited with flexion, extension, lateral rotation and ear-to-shoulder bend.   Midline TTP: Positive     Thoracic:  Midline TTP: Positive.     Lumbar:  Midline TTP: Positive  Straight Leg Test: Positive bilaterally     Diagnostic Results:  There is no new imaging to review for this encounter.      ASSESSMENT/PLAN:     Carleen Kincaid is a 64 y.o. female past history of neurogenic claudication s/p L4-5 posterior fusion in 2014. She has been followed by our team for worsening lumbar radiculopathy due to adjacent level disease at L3-4. Her symptoms have progressed since the last appointment and there has been increased weakness and paraesthesias. The patient also reports neck and upper back pain. Given her exam findings:    MRI of the entire spine to evaluate for stenosis contributing to her weakness and  paraesthesias  Dynamic x-rays     I would like the patient to follow-up virtually in 2-4 weeks to discuss the findings and next steps. I have encouraged her to contact the clinic with any questions, concerns, or adverse clinical changes. She verbalized understanding.      CIRA Yip-FELISHA  Neurosurgery  [facility]. Aide.      Note dictated with voice recognition software, please excuse any grammatical errors.

## 2024-11-25 ENCOUNTER — HOSPITAL ENCOUNTER (OUTPATIENT)
Dept: RADIOLOGY | Facility: HOSPITAL | Age: 64
Discharge: HOME | End: 2024-11-25
Attending: NURSE PRACTITIONER
Payer: MEDICARE

## 2024-11-25 DIAGNOSIS — M54.16 LUMBAR RADICULOPATHY: ICD-10-CM

## 2024-11-25 DIAGNOSIS — Z98.1 HX OF SPINAL FUSION: ICD-10-CM

## 2024-11-25 DIAGNOSIS — M48.02 SPINAL STENOSIS, CERVICAL REGION: ICD-10-CM

## 2024-11-25 DIAGNOSIS — M48.02 CERVICAL STENOSIS OF SPINAL CANAL: ICD-10-CM

## 2024-11-25 DIAGNOSIS — M54.6 PAIN IN THORACIC SPINE: ICD-10-CM

## 2024-11-25 DIAGNOSIS — M54.9 DORSALGIA, UNSPECIFIED: ICD-10-CM

## 2024-11-25 PROCEDURE — 72148 MRI LUMBAR SPINE W/O DYE: CPT | Mod: 26,,, | Performed by: RADIOLOGY

## 2024-11-25 PROCEDURE — 72146 MRI CHEST SPINE W/O DYE: CPT | Mod: TC

## 2024-11-25 PROCEDURE — 72141 MRI NECK SPINE W/O DYE: CPT | Mod: 26,,, | Performed by: RADIOLOGY

## 2024-11-25 PROCEDURE — 72114 X-RAY EXAM L-S SPINE BENDING: CPT | Mod: TC

## 2024-11-25 PROCEDURE — 72146 MRI CHEST SPINE W/O DYE: CPT | Mod: 26,,, | Performed by: RADIOLOGY

## 2024-11-25 PROCEDURE — 72114 X-RAY EXAM L-S SPINE BENDING: CPT | Mod: 26,,, | Performed by: RADIOLOGY

## 2024-11-25 PROCEDURE — 72141 MRI NECK SPINE W/O DYE: CPT | Mod: TC

## 2024-11-25 PROCEDURE — 72148 MRI LUMBAR SPINE W/O DYE: CPT | Mod: TC

## 2024-12-04 ENCOUNTER — OFFICE VISIT (OUTPATIENT)
Dept: DERMATOLOGY | Facility: CLINIC | Age: 64
End: 2024-12-04
Payer: MEDICARE

## 2024-12-04 ENCOUNTER — PATIENT MESSAGE (OUTPATIENT)
Dept: RHEUMATOLOGY | Facility: CLINIC | Age: 64
End: 2024-12-04
Payer: MEDICARE

## 2024-12-04 ENCOUNTER — DOCUMENTATION ONLY (OUTPATIENT)
Dept: DERMATOLOGY | Facility: CLINIC | Age: 64
End: 2024-12-04
Payer: MEDICARE

## 2024-12-04 ENCOUNTER — LAB VISIT (OUTPATIENT)
Dept: LAB | Facility: HOSPITAL | Age: 64
End: 2024-12-04
Attending: DERMATOLOGY
Payer: MEDICARE

## 2024-12-04 DIAGNOSIS — L40.0 PSORIASIS VULGARIS: Primary | ICD-10-CM

## 2024-12-04 DIAGNOSIS — L40.0 PSORIASIS VULGARIS: ICD-10-CM

## 2024-12-04 DIAGNOSIS — L40.50 PSORIATIC ARTHRITIS: ICD-10-CM

## 2024-12-04 DIAGNOSIS — Z79.899 LONG-TERM USE OF HIGH-RISK MEDICATION: ICD-10-CM

## 2024-12-04 LAB
ALBUMIN SERPL BCP-MCNC: 3.9 G/DL (ref 3.5–5.2)
ALP SERPL-CCNC: 85 U/L (ref 40–150)
ALT SERPL W/O P-5'-P-CCNC: 17 U/L (ref 10–44)
ANION GAP SERPL CALC-SCNC: 7 MMOL/L (ref 8–16)
AST SERPL-CCNC: 18 U/L (ref 10–40)
BASOPHILS # BLD AUTO: 0.03 K/UL (ref 0–0.2)
BASOPHILS NFR BLD: 0.5 % (ref 0–1.9)
BILIRUB SERPL-MCNC: 0.4 MG/DL (ref 0.1–1)
BUN SERPL-MCNC: 20 MG/DL (ref 8–23)
CALCIUM SERPL-MCNC: 9.3 MG/DL (ref 8.7–10.5)
CHLORIDE SERPL-SCNC: 107 MMOL/L (ref 95–110)
CO2 SERPL-SCNC: 24 MMOL/L (ref 23–29)
CREAT SERPL-MCNC: 0.7 MG/DL (ref 0.5–1.4)
DIFFERENTIAL METHOD BLD: NORMAL
EOSINOPHIL # BLD AUTO: 0.1 K/UL (ref 0–0.5)
EOSINOPHIL NFR BLD: 1.1 % (ref 0–8)
ERYTHROCYTE [DISTWIDTH] IN BLOOD BY AUTOMATED COUNT: 13.6 % (ref 11.5–14.5)
EST. GFR  (NO RACE VARIABLE): >60 ML/MIN/1.73 M^2
GLUCOSE SERPL-MCNC: 80 MG/DL (ref 70–110)
HCT VFR BLD AUTO: 38.3 % (ref 37–48.5)
HGB BLD-MCNC: 12.3 G/DL (ref 12–16)
IMM GRANULOCYTES # BLD AUTO: 0.01 K/UL (ref 0–0.04)
IMM GRANULOCYTES NFR BLD AUTO: 0.2 % (ref 0–0.5)
LYMPHOCYTES # BLD AUTO: 2.1 K/UL (ref 1–4.8)
LYMPHOCYTES NFR BLD: 38.1 % (ref 18–48)
MCH RBC QN AUTO: 30 PG (ref 27–31)
MCHC RBC AUTO-ENTMCNC: 32.1 G/DL (ref 32–36)
MCV RBC AUTO: 93 FL (ref 82–98)
MONOCYTES # BLD AUTO: 0.4 K/UL (ref 0.3–1)
MONOCYTES NFR BLD: 7.9 % (ref 4–15)
NEUTROPHILS # BLD AUTO: 2.9 K/UL (ref 1.8–7.7)
NEUTROPHILS NFR BLD: 52.2 % (ref 38–73)
NRBC BLD-RTO: 0 /100 WBC
PLATELET # BLD AUTO: 237 K/UL (ref 150–450)
PMV BLD AUTO: 10.8 FL (ref 9.2–12.9)
POTASSIUM SERPL-SCNC: 4.2 MMOL/L (ref 3.5–5.1)
PROT SERPL-MCNC: 6.8 G/DL (ref 6–8.4)
RBC # BLD AUTO: 4.1 M/UL (ref 4–5.4)
SODIUM SERPL-SCNC: 138 MMOL/L (ref 136–145)
WBC # BLD AUTO: 5.57 K/UL (ref 3.9–12.7)

## 2024-12-04 PROCEDURE — 36415 COLL VENOUS BLD VENIPUNCTURE: CPT | Mod: PO | Performed by: DERMATOLOGY

## 2024-12-04 PROCEDURE — 85025 COMPLETE CBC W/AUTO DIFF WBC: CPT | Performed by: DERMATOLOGY

## 2024-12-04 PROCEDURE — 80053 COMPREHEN METABOLIC PANEL: CPT | Performed by: DERMATOLOGY

## 2024-12-04 PROCEDURE — 99213 OFFICE O/P EST LOW 20 MIN: CPT | Mod: PBBFAC,PO | Performed by: DERMATOLOGY

## 2024-12-04 PROCEDURE — 99999PBSHW PR TRIAMCI2LONE ACETONIDE INJ PER 10MG: Mod: PBBFAC,,,

## 2024-12-04 PROCEDURE — 99999 PR PBB SHADOW E&M-EST. PATIENT-LVL III: CPT | Mod: PBBFAC,,, | Performed by: DERMATOLOGY

## 2024-12-04 PROCEDURE — 99214 OFFICE O/P EST MOD 30 MIN: CPT | Mod: 25,S$PBB,, | Performed by: DERMATOLOGY

## 2024-12-04 PROCEDURE — 11900 INJECT SKIN LESIONS </W 7: CPT | Mod: PBBFAC,PO | Performed by: DERMATOLOGY

## 2024-12-04 RX ORDER — CLOBETASOL PROPIONATE 0.05 G/100ML
SHAMPOO TOPICAL
Qty: 118 ML | Refills: 6 | Status: SHIPPED | OUTPATIENT
Start: 2024-12-04

## 2024-12-04 RX ORDER — CLOBETASOL PROPIONATE 0.5 MG/ML
SOLUTION TOPICAL
Qty: 50 ML | Refills: 2 | Status: SHIPPED | OUTPATIENT
Start: 2024-12-04

## 2024-12-04 RX ORDER — SECUKINUMAB 150 MG/ML
INJECTION SUBCUTANEOUS
Qty: 2 ML | Refills: 6 | Status: SHIPPED | OUTPATIENT
Start: 2024-12-04

## 2024-12-04 NOTE — PROGRESS NOTES
Subjective:      Patient ID:  Carleen Kincaid is a 64 y.o. female who presents for   Chief Complaint   Patient presents with    Medication Refill     F/u     Pt present today for medication management.   She is on cosentyx   Last injection was 10/28;  she has failed enbrel and humira and topicals in the past.  Her scalp is flaring and is extremely itchy  Joint pain is progressing and cosentyx helped a lot more with her joint pain than humira  Pt has had to spread out her injections bc her  lost his insurance and she is in a lot of pain.  Her insurance does not start until jan 2024 so she is out of medication     Medication Refill - Follow-up      Review of Systems   Constitutional:  Negative for weight loss and weight gain.   HENT:  Negative for mouth sores (no tongue whiteness).    Respiratory:  Negative for shortness of breath.    Gastrointestinal:  Negative for nausea, vomiting, abdominal pain and diarrhea.   Musculoskeletal:  Positive for arthralgias.        Back pain is major issue, left foot   Hands are improved.    Skin:  Positive for itching and rash (but no rashes/itching in folds).        Injection site reaction - no   yes   Psychiatric/Behavioral:  Negative for depressed mood.        Objective:   Physical Exam   Skin:   Areas Examined (abnormalities noted in diagram):   Scalp / Hair Palpated and Inspected  Head / Face Inspection Performed  RUE Inspected  LUE Inspection Performed  Nails and Digits Inspection Performed                     Diagram Legend     Erythematous scaling macule/papule c/w actinic keratosis       Vascular papule c/w angioma      Pigmented verrucoid papule/plaque c/w seborrheic keratosis      Yellow umbilicated papule c/w sebaceous hyperplasia      Irregularly shaped tan macule c/w lentigo     1-2 mm smooth white papules consistent with Milia      Movable subcutaneous cyst with punctum c/w epidermal inclusion cyst      Subcutaneous movable cyst c/w pilar cyst      Firm pink  to brown papule c/w dermatofibroma      Pedunculated fleshy papule(s) c/w skin tag(s)      Evenly pigmented macule c/w junctional nevus     Mildly variegated pigmented, slightly irregular-bordered macule c/w mildly atypical nevus      Flesh colored to evenly pigmented papule c/w intradermal nevus       Pink pearly papule/plaque c/w basal cell carcinoma      Erythematous hyperkeratotic cursted plaque c/w SCC      Surgical scar with no sign of skin cancer recurrence      Open and closed comedones      Inflammatory papules and pustules      Verrucoid papule consistent consistent with wart     Erythematous eczematous patches and plaques     Dystrophic onycholytic nail with subungual debris c/w onychomycosis     Umbilicated papule    Erythematous-base heme-crusted tan verrucoid plaque consistent with inflamed seborrheic keratosis     Erythematous Silvery Scaling Plaque c/w Psoriasis     See annotation      Assessment / Plan:        Psoriasis vulgaris  Psoriatic arthritis  - Long-term use of high-risk medication  -     COSENTYX PEN, 2 PENS, 150 mg/mL PnIj; Inject 300mg (2 pens) SQ q 4 weeks  Dispense: 2 mL; Refill: 6    Discussed risks of infection, especially  yeast and fungal infections, avoid live vaccines, need for TB screenings, hypersensitivity reactions. Pt with no hx of Chron's disease.   Will continue  Cosentyx at dose of 300mg given by 2- 150 mg prefilled syringes by SQ injection  q month.    Will check CBC 1 month after starting medication and then q3-6 months.    Refilled clobex shampoo and clobetasol scalp solution     Intralesional Kenalog 10mg/cc (1.5 cc total) injected into 2 lesions on the scalp  today after obtaining verbal consent including risk of surrounding hypopigmentation. Patient tolerated procedure well.  Units: 2.0  NDC for Kenalog 10mg/cc:  6730-8941-95  Lab Results   Component Value Date    WBC 5.41 04/04/2024    HGB 13.2 04/04/2024    HCT 41.6 04/04/2024    MCV 90 04/04/2024      04/04/2024       Lab Results   Component Value Date    ALT 18 04/04/2024    AST 20 04/04/2024    ALKPHOS 98 04/04/2024    BILITOT 0.4 04/04/2024                Follow up in about 6 months (around 6/4/2025) for Medication Management.

## 2024-12-04 NOTE — PROGRESS NOTES
Patient received sample medication per verbal order by . Medication Cosentyx  dose 300mg/2ml.   2 boxes given Lot [id number], expiration May 2025.

## 2024-12-04 NOTE — PATIENT INSTRUCTIONS
Discussed risks of infection, especially  yeast and fungal infections, avoid live vaccines, need for TB screenings, hypersensitivity reactions. Pt with no hx of Chron's disease.   Will continue  Cosentyx at dose of 300mg given by 2- 150 mg prefilled syringes by SQ injection  q month.    Will check CBC 1 month after starting medication and then q3-6 months.      You have been prescribed a biologic or high risk medication for your condition.  It is very important that you keep your follow up appointments.  There are labs that must be monitored to ensure your safety on the medication. Furthermore, it is often necessary for clinical improvement to be documented in order for the medication to be continued to be covered by your insurance company.  We strive to follow the standard of care at [facility] and routine follow ups for refills on these mediations are mandated and best for your healthcare.

## 2024-12-05 NOTE — PROGRESS NOTES
Ms. Kincaid,  Your labs on Cosentyx look good.  Please keep your follow up so that we can appropriately manage your healthcare and refill your medications. Failure to keep your follow up may delay getting your medication refills.   Take care, Dr Escobedo

## 2024-12-09 ENCOUNTER — TELEPHONE (OUTPATIENT)
Dept: PAIN MEDICINE | Facility: CLINIC | Age: 64
End: 2024-12-09
Payer: MEDICARE

## 2024-12-09 ENCOUNTER — OFFICE VISIT (OUTPATIENT)
Dept: NEUROSURGERY | Facility: CLINIC | Age: 64
End: 2024-12-09
Payer: MEDICARE

## 2024-12-09 DIAGNOSIS — M54.16 LUMBAR RADICULOPATHY: Primary | ICD-10-CM

## 2024-12-09 DIAGNOSIS — M48.02 SPINAL STENOSIS, CERVICAL REGION: ICD-10-CM

## 2024-12-09 PROCEDURE — 99441 PR PHYSICIAN TELEPHONE EVALUATION 5-10 MIN: CPT | Mod: 95,,, | Performed by: NURSE PRACTITIONER

## 2024-12-09 NOTE — PROGRESS NOTES
Established Patient - Audio Only Telehealth Visit     The patient location is: Home  The chief complaint leading to consultation is: MRI follow-up  Visit type: Virtual visit with audio only (telephone)  Total time spent with patient: 10 minutes       The reason for the audio only service rather than synchronous audio and video virtual visit was related to technical difficulties or patient preference/necessity.     Each patient to whom I provide medical services by telemedicine is:  (1) informed of the relationship between the physician and patient and the respective role of any other health care provider with respect to management of the patient; and (2) notified that they may decline to receive medical services by telemedicine and may withdraw from such care at any time. Patient verbally consented to receive this service via voice-only telephone call.       History of Present Illness (per Марина Hanna PA-C note): Carleen Kincaid is a 61 y.o. female with past history of neurogenic claudication s/p L4-5 posterior fusion in 2014 who was referred to me by Merrill Godinez PA-C with Orthopedics. She has an extensive orthopedic history, including bilateral knee replacements with most recently failed left knee replacement. She is scheduled for a left knee arthroplasty revision in early April. She is here for complaints of returned lower back pain as well as leg pain that began about 2 years ago. She initially underwent an L4-5 posterior fusion for neurogenic claudication and spondylolisthesis in 2014 by Dr. Richard Sanchez in [place], [place]. Her symptoms improved post operatively. About 2 years ago she had return of her lower back pain and pain that radiates down the posterior aspect of bilateral legs. Pain in the legs is exacerbated by walking for long periods. It is relieved by leaning forward and sitting. She endorses several episodes of loss of control of her bowels and bladder in the past month. She denies any  focal weakness or saddle anesthesia. She has not tried physical therapy or pain management      Interval Hx 5/18/22: After the last appointment with Марина Hanna, it was recommended that the patient obtain a MRI to evaluate her chronic back pain. The patient is being seen today in clinic to review the image findings. States that some of her symptoms have improved since her recent left knee arthroplasty. She is able to stand and walk for longer periods. She is doing PT. She does continue to have pain in the low back.      Interval Hx 11/4/24: The patient is being seen in clinic today to discuss concerns with worsening low back and left hip/leg pain. States that she obtained the injection a couple of years ago with significant relief. She is concerned that her left leg is becoming weaker and numb. Rates the pain as a 3/10. Describes the pain as constant and aching. Aggravating factors include standing, walking, and bending. Alleviating factors include rest. Denies b/b dysfunction or gait instability. Reports intermittent tingling in her groin and stabbing pain. Lastly, she reports neck and upper back pain.     Interval Hx 12/9/24: The patient is being seen virtually today to discuss the MRI findings of the entire spine. States that her symptoms remain unchanged since the last appointment. The MRIs dated 11/25/24 shows cervical spine multilevel degenerative changes C3-6 with moderate neural foraminal narrowing; thoracic spine shows multilevel most pronounced at T11-12 and contributing to moderate spinal canal; lumbar spine shows posterior lumbar interbody fusion and decompression at L4-5 and degenerative changes most pronounced at L3-4 and resulting in severe right mild left neural foraminal narrowing and mild spinal canal stenosis. We discussed that injections should be considered again as she has obtained relief in the past. I will refer her back. I would like the patient to follow-up in clinic as needed. I have  encouraged her to contact the clinic with any questions, concerns, or adverse clinical changes. She verbalized understanding.      CIRA Yip-FELISHA  Neurosurgery  [facility]. Aide.           This service was not originating from a related E/M service provided within the previous 7 days nor will  to an E/M service or procedure within the next 24 hours or my soonest available appointment.  Prevailing standard of care was able to be met in this audio-only visit.

## 2024-12-09 NOTE — TELEPHONE ENCOUNTER
----- Message from Su Gaytan NP sent at 12/9/2024  3:07 PM CST -----  Regarding: RE: Appointment  Thank you for getting back to me. I have placed the referral.  ----- Message -----  From: Matilde Crystal LPN  Sent: 12/9/2024   3:04 PM CST  To: Su Gaytan NP  Subject: RE: Appointment                                  We have never seen pt in office please send a referral for consult. Please  ----- Message -----  From: Su Gaytan NP  Sent: 12/9/2024   2:50 PM CST  To: Javi Matt Staff  Subject: Appointment                                      Good Afternoon,    I recently saw this patient and she obtained updated imaging and would like to see Dr. Caldwell to discuss additional injections. Could you please reach out to her with an appointment.    Thank you,    REG Yip  Neurosurgery  [facility].

## 2024-12-16 ENCOUNTER — OFFICE VISIT (OUTPATIENT)
Dept: PAIN MEDICINE | Facility: CLINIC | Age: 64
End: 2024-12-16
Payer: MEDICARE

## 2024-12-16 ENCOUNTER — TELEPHONE (OUTPATIENT)
Dept: PAIN MEDICINE | Facility: CLINIC | Age: 64
End: 2024-12-16

## 2024-12-16 VITALS
SYSTOLIC BLOOD PRESSURE: 124 MMHG | WEIGHT: 164.88 LBS | HEIGHT: 61 IN | HEART RATE: 69 BPM | BODY MASS INDEX: 31.13 KG/M2 | DIASTOLIC BLOOD PRESSURE: 78 MMHG

## 2024-12-16 DIAGNOSIS — M47.896 OTHER SPONDYLOSIS, LUMBAR REGION: Primary | ICD-10-CM

## 2024-12-16 DIAGNOSIS — M51.34 DDD (DEGENERATIVE DISC DISEASE), THORACIC: ICD-10-CM

## 2024-12-16 DIAGNOSIS — M50.30 DDD (DEGENERATIVE DISC DISEASE), CERVICAL: ICD-10-CM

## 2024-12-16 DIAGNOSIS — M54.16 LUMBAR RADICULOPATHY: Primary | ICD-10-CM

## 2024-12-16 DIAGNOSIS — M51.369 DEGENERATION OF INTERVERTEBRAL DISC OF LUMBAR REGION, UNSPECIFIED WHETHER PAIN PRESENT: ICD-10-CM

## 2024-12-16 DIAGNOSIS — M54.16 LUMBAR RADICULOPATHY: ICD-10-CM

## 2024-12-16 PROCEDURE — G2211 COMPLEX E/M VISIT ADD ON: HCPCS | Mod: S$PBB,,, | Performed by: ANESTHESIOLOGY

## 2024-12-16 PROCEDURE — 99999 PR PBB SHADOW E&M-EST. PATIENT-LVL IV: CPT | Mod: PBBFAC,,, | Performed by: ANESTHESIOLOGY

## 2024-12-16 PROCEDURE — 99214 OFFICE O/P EST MOD 30 MIN: CPT | Mod: PBBFAC,PN | Performed by: ANESTHESIOLOGY

## 2024-12-16 PROCEDURE — 99205 OFFICE O/P NEW HI 60 MIN: CPT | Mod: S$PBB,,, | Performed by: ANESTHESIOLOGY

## 2024-12-16 RX ORDER — GABAPENTIN 300 MG/1
300 CAPSULE ORAL 3 TIMES DAILY
Qty: 90 CAPSULE | Refills: 1 | Status: SHIPPED | OUTPATIENT
Start: 2024-12-16 | End: 2025-12-16

## 2024-12-16 NOTE — PROGRESS NOTES
This note was completed with dictation software and grammatical errors may exist.    Referring Physician: Su Gaytan NP    PCP: Inga Black MD      CC: lower back pain    HPI:   Carleen Kincaid is a 64 y.o. female referred to us for lower back pain.  Patient with history of L4-5 fusion in 2014.  She states having continued lower back pain for over 5 years.  Pain is a constant aching, throbbing pain in his lower back.  Pain occasionally radiates down her left anterior thigh.  She has tingling in her whole posterior thighs as well.  Pain worsens with sitting, standing, lying, walking getting up.  Pain improves with rest.  She recently seen by Neurosurgery.  She had MRIs of the cervical, thoracic and lumbar spine.  She does have some ongoing midback pain as well as neck pain.  Lower back pain is more bothersome.  She has tried physical therapy with minimal benefit.  She underwent a lumbar epidural steroid injection in 2022 which provided over 50% benefit for over a year.  He desires repeat procedure with us.  She denies any worsening weakness.  No bowel bladder changes.    ROS:  CONSTITUTIONAL: No fevers, chills, night sweats, wt. loss, appetite changes  SKIN: no rashes or itching  ENT: No headaches, head trauma, vision changes, or eye pain  LYMPH NODES: None noticed   CV: No chest pain, palpitations.   RESP: No shortness of breath, dyspnea on exertion, cough, wheezing, or hemoptysis  GI: No nausea, emesis, diarrhea, constipation, melena, hematochezia, pain.    : No dysuria, hematuria, urgency, or frequency   HEME: No easy bruising, bleeding problems  PSYCHIATRIC: No depression, anxiety, psychosis, hallucinations.  NEURO: No seizures, memory loss, dizziness or difficulty sleeping  MSK: +HPI      Past Medical History:   Diagnosis Date    Anxiety     Degenerative disc disease, cervical     Depression     General anesthetics causing adverse effect in therapeutic use     NO DEMEROL    Hyperlipidemia      Hypertension     Internal derangement of right knee 2/13/2017    Joint pain 11/12/2014    Kidney stones     Knee pain 2/13/2017    Psoriasis 1983    Right knee pain 11/16/2015    Stroke     retinal stroke     Past Surgical History:   Procedure Laterality Date    APPENDECTOMY      EPIDURAL STEROID INJECTION N/A 7/13/2022    Procedure: Injection, Steroid, Epidural;  Surgeon: Shaka Caldwell MD;  Location: [facility];  Service: Pain Management;  Laterality: N/A;  L3-4 (dr Gonzalez)     HYSTERECTOMY      partial    JOINT REPLACEMENT Bilateral     Knees    KNEE ARTHROSCOPY Right 8/27/2015    Dr Roberts    lithtotrispy      LUMBAR SPINE SURGERY  10/2014    L4-L5 fusion    REVISION OF KNEE ARTHROPLASTY Left 4/5/2022    Procedure: REVISION, ARTHROPLASTY, KNEE;  Surgeon: Sri Roberts MD;  Location: [facility] OR;  Service: Orthopedics;  Laterality: Left;  general, regional w catheter, adductor, epidural, spinal, ester 50cc     Family History   Problem Relation Name Age of Onset    Thyroid disease Mother      Cancer Mother  55        lymphoma    Paget's disease of bone Mother      Psoriasis Father      Cancer Father  82        Bladder cancer    Diabetes Mellitus Brother      Heart disease Brother      Cancer Brother          prostate    Melanoma Brother      Bipolar disorder Daughter      Kidney disease Daughter          reflux    Psoriasis Maternal Grandmother      Diabetes Mellitus Brother      Heart disease Brother      Cancer Brother          prostate    Melanoma Brother      Diabetes Mellitus Brother      Heart disease Brother      Cancer Brother          prostate    Melanoma Brother      Lupus Neg Hx      Inflammatory bowel disease Neg Hx      Rheum arthritis Neg Hx       Social History     Socioeconomic History    Marital status:      Spouse name: Jose    Number of children: 3   Tobacco Use    Smoking status: Never    Smokeless tobacco: Never    Tobacco comments:     ; ; 3 children   Substance and  "Sexual Activity    Alcohol use: Yes     Comment: rarely    Drug use: No   Social History Narrative    Stairs-      Social Drivers of Health     Financial Resource Strain: Low Risk  (4/4/2024)    Overall Financial Resource Strain (CARDIA)     Difficulty of Paying Living Expenses: Not very hard   Food Insecurity: No Food Insecurity (4/4/2024)    Hunger Vital Sign     Worried About Running Out of Food in the Last Year: Never true     Ran Out of Food in the Last Year: Never true   Transportation Needs: No Transportation Needs (4/4/2024)    PRAPARE - Transportation     Lack of Transportation (Medical): No     Lack of Transportation (Non-Medical): No   Physical Activity: Unknown (4/4/2024)    Exercise Vital Sign     Days of Exercise per Week: 3 days   Stress: Stress Concern Present (4/4/2024)    [employer] - Occupational Stress Questionnaire     Feeling of Stress : To some extent   Housing Stability: Low Risk  (4/4/2024)    Housing Stability Vital Sign     Unable to Pay for Housing in the Last Year: No     Number of Places Lived in the Last Year: 1     Unstable Housing in the Last Year: No         Medications/Allergies: See med card    Vitals:    12/16/24 0912   Weight: 74.8 kg (164 lb 14.5 oz)   Height: 5' 1" (1.549 m)   PainSc:   3   PainLoc: Back         Physical exam:    GENERAL: A and O x3, the patient appears well groomed and is in no acute distress.  Skin: No rashes or obvious lesions  HEENT: normocephalic, atraumatic  CARDIOVASCULAR:  Palpable peripheral pulses  LUNGS: easy work of breathing  ABDOMEN: soft, nontender   UPPER EXTREMITIES: Normal alignment, normal range of motion, no atrophy, no skin changes,  hair growth and nail growth normal and equal bilaterally. No swelling, no tenderness.    LOWER EXTREMITIES:  Normal alignment, normal range of motion, no atrophy, no skin changes,  hair growth and nail growth normal and equal bilaterally. No swelling, no tenderness.  CERVICAL " SPINE:  Cervical spine: ROM is limited in flexion, extension and lateral rotation with moderate increased pain.  Spurling's maneuver causes no neck pain to either side.  Myofascial exam: No Tenderness to palpation across cervical paraspinous region bilaterally.    LUMBAR SPINE  Lumbar spine: ROM is limited with flexion extension and oblique extension with moderate increased pain.    Dion's test causes no increased pain on either side.    Supine straight leg raise is negative bilaterally.  +femoral stretch  Internal and external rotation of the hip causes no increased pain on either side.  Myofascial exam: No tenderness to palpation across lumbar paraspinous muscles.      MENTAL STATUS: normal orientation, speech, language, and fund of knowledge for social situation.  Emotional state appropriate.    CRANIAL NERVES:  II:  PERRL bilaterally,   III,IV,VI: EOMI.    V:  Facial sensation equal bilaterally  VII:  Facial motor function normal.  VIII:  Hearing equal to finger rub bilaterally  IX/X: Gag normal, palate symmetric  XI:  Shoulder shrug equal, head turn equal  XII:  Tongue midline without fasciculations      MOTOR: Tone and bulk: normal bilateral upper and lower Strength: normal   SENSATION: Light touch and pinprick intact bilaterally  REFLEXES: normal, symmetric, nonbrisk.  Toes down, no clonus. No hoffmans.  GAIT: normal rise, base, steps, and arm swing.        Imaging:  MRI C-spine 11/2024  C3-C4: Anterolisthesis with uncovering the disc.  Posterior disc osteophyte complex, asymmetric to the left.  Severe left and mild right facet arthropathy.  Marked left uncovertebral joint spurring.  Severe left neural foraminal stenosis.  Ligamentum flavum thickening.  Mild spinal canal stenosis.     C4-C5: Anterolisthesis with uncovering of the disc.  Posterior disc osteophyte complex.  Moderate right and mild left facet arthropathy.  Moderate right and mild left uncovertebral joint spurring.  Moderate right and mild  left neural foraminal stenosis.  Ligamentum flavum thickening.  Mild spinal canal stenosis.     C5-C6: Anterolisthesis with uncovering the disc.  Posterior disc osteophyte complex.  Moderate left and mild right neural facet arthropathy.  Moderate bilateral uncovertebral joint spurring.  Moderate right and mild left neural foraminal stenosis.  Ligamentum flavum thickening.  Mild spinal canal stenosis.     C6-C7: Posterior disc osteophyte complex.  Mild bilateral facet arthropathy.  Moderate left and mild right uncovertebral joint spurring.  Mild-to-moderate left neural foraminal stenosis.  No significant spinal canal stenosis.     C7-T1: Mild posterior disc osteophyte complex.  Mild bilateral facet arthropathy.  Mild bilateral uncovertebral joint spurring.  Mild left greater than right neural foraminal stenosis.  No significant spinal canal stenosis.    MRI T-spine 11/2024  Impression:     1. Multilevel moderate to severe degenerative changes of the thoracic spine, as detailed above, most pronounced at T11-12 and contributing to moderate spinal canal and mild neural foraminal stenosis.  2. Moderate neural foraminal and mild spinal canal stenosis at T1-2.  Mild neural foraminal and spinal canal stenosis elsewhere in the thoracic spine.      MRI L-spine 11/2024  Vertebral column: Postsurgical changes of posterior lumbar interbody fusion at L4-5 with associated susceptibility artifact.  Decompressive laminectomy at this level.  No acute fracture is identified; however, if trauma is suspected, a CT scan would be a more sensitive examination for fractures. No evidence of an aggressive marrow replacement process.  Severe intervertebral disc space narrowing asymmetrically on the right with vacuum disc phenomenon, and Modic type 2 degenerative endplate changes with Schmorl's nodes and marginal osteophyte formation.     Cord: Normal.  Conus terminates at L2.     Degenerative findings:     T12-L1: Mild circumferential disc  bulge with small central annular fissure.  Mild bilateral facet arthropathy and ligamentum flavum thickening.  No significant neural foraminal or spinal canal stenosis.     L1-L2: Left foraminal/extraforaminal disc protrusion.  Mild bilateral facet arthropathy and ligamentum flavum thickening.  Mild left neural foraminal stenosis.  No spinal canal stenosis.     L2-L3: Mild circumferential disc bulge, asymmetric to the left.  Mild bilateral facet arthropathy.  No significant neural foraminal or spinal canal stenosis.     L3-L4: Severe right asymmetric disc space narrowing.  Retrolisthesis.  Circumferential disc bulge with osteophytic ridging, asymmetric to the right.  Moderate bilateral facet arthropathy.  Severe right and mild left neural foraminal stenosis.  Mild spinal canal stenosis.  Mild-to-moderate right lateral recess stenosis.     L4-L5: Postoperative changes, as above.   No significant neural foraminal or spinal canal stenosis.     L5-S1: Mild to moderate left asymmetric disc space narrowing.  Circumferential disc bulge, asymmetric to the left.  Severe bilateral facet arthropathy with bilateral facet effusions.  Ligamentum flavum thickening.  Moderate left and mild right neural foraminal stenosis.  No significant spinal canal stenosis.    Assessment:  Patient presents with lower back pain.  1. Other spondylosis, lumbar region    2. Lumbar radiculopathy    3. Degeneration of intervertebral disc of lumbar region, unspecified whether pain present    4. DDD (degenerative disc disease), cervical    5. DDD (degenerative disc disease), thoracic          Plan:  I have stressed the importance of physical activity and exercise to improve overall health  Reviewed pertinent imaging and records with patient  I think that the patient's back pain and radicular leg symptoms are due to degenerative disc disease and have recommended a lumbar epidural steroid injection to the L3-4 level(s).  May consider thoracic EVELIO in the  future  Start gabapentin 300 mg t.i.d. for anti neuropathic adjunct.  Follow-up after procedure    Thank you for referring this interesting patient, and I look forward to continuing to collaborate in her care.

## 2024-12-16 NOTE — H&P (VIEW-ONLY)
This note was completed with dictation software and grammatical errors may exist.    Referring Physician: Su Gaytan NP    PCP: Inga Black MD      CC: lower back pain    HPI:   Carleen Kincaid is a 64 y.o. female referred to us for lower back pain.  Patient with history of L4-5 fusion in 2014.  She states having continued lower back pain for over 5 years.  Pain is a constant aching, throbbing pain in his lower back.  Pain occasionally radiates down her left anterior thigh.  She has tingling in her whole posterior thighs as well.  Pain worsens with sitting, standing, lying, walking getting up.  Pain improves with rest.  She recently seen by Neurosurgery.  She had MRIs of the cervical, thoracic and lumbar spine.  She does have some ongoing midback pain as well as neck pain.  Lower back pain is more bothersome.  She has tried physical therapy with minimal benefit.  She underwent a lumbar epidural steroid injection in 2022 which provided over 50% benefit for over a year.  He desires repeat procedure with us.  She denies any worsening weakness.  No bowel bladder changes.    ROS:  CONSTITUTIONAL: No fevers, chills, night sweats, wt. loss, appetite changes  SKIN: no rashes or itching  ENT: No headaches, head trauma, vision changes, or eye pain  LYMPH NODES: None noticed   CV: No chest pain, palpitations.   RESP: No shortness of breath, dyspnea on exertion, cough, wheezing, or hemoptysis  GI: No nausea, emesis, diarrhea, constipation, melena, hematochezia, pain.    : No dysuria, hematuria, urgency, or frequency   HEME: No easy bruising, bleeding problems  PSYCHIATRIC: No depression, anxiety, psychosis, hallucinations.  NEURO: No seizures, memory loss, dizziness or difficulty sleeping  MSK: +HPI      Past Medical History:   Diagnosis Date    Anxiety     Degenerative disc disease, cervical     Depression     General anesthetics causing adverse effect in therapeutic use     NO DEMEROL    Hyperlipidemia      Hypertension     Internal derangement of right knee 2/13/2017    Joint pain 11/12/2014    Kidney stones     Knee pain 2/13/2017    Psoriasis 1983    Right knee pain 11/16/2015    Stroke     retinal stroke     Past Surgical History:   Procedure Laterality Date    APPENDECTOMY      EPIDURAL STEROID INJECTION N/A 7/13/2022    Procedure: Injection, Steroid, Epidural;  Surgeon: Shaka Caldwell MD;  Location: [facility] OR;  Service: Pain Management;  Laterality: N/A;  L3-4 (dr Gonzalez)     HYSTERECTOMY      partial    JOINT REPLACEMENT Bilateral     Knees    KNEE ARTHROSCOPY Right 8/27/2015    Dr Roberts    lithtotrispy      LUMBAR SPINE SURGERY  10/2014    L4-L5 fusion    REVISION OF KNEE ARTHROPLASTY Left 4/5/2022    Procedure: REVISION, ARTHROPLASTY, KNEE;  Surgeon: Sri Roberts MD;  Location: [facility] OR;  Service: Orthopedics;  Laterality: Left;  general, regional w catheter, adductor, epidural, spinal, ester 50cc     Family History   Problem Relation Name Age of Onset    Thyroid disease Mother      Cancer Mother  55        lymphoma    Paget's disease of bone Mother      Psoriasis Father      Cancer Father  82        Bladder cancer    Diabetes Mellitus Brother      Heart disease Brother      Cancer Brother          prostate    Melanoma Brother      Bipolar disorder Daughter      Kidney disease Daughter          reflux    Psoriasis Maternal Grandmother      Diabetes Mellitus Brother      Heart disease Brother      Cancer Brother          prostate    Melanoma Brother      Diabetes Mellitus Brother      Heart disease Brother      Cancer Brother          prostate    Melanoma Brother      Lupus Neg Hx      Inflammatory bowel disease Neg Hx      Rheum arthritis Neg Hx       Social History     Socioeconomic History    Marital status:      Spouse name: Jose    Number of children: 3   Tobacco Use    Smoking status: Never    Smokeless tobacco: Never    Tobacco comments:     ; ; 3 children   Substance and  "Sexual Activity    Alcohol use: Yes     Comment: rarely    Drug use: No   Social History Narrative    Stairs-      Social Drivers of Health     Financial Resource Strain: Low Risk  (4/4/2024)    Overall Financial Resource Strain (CARDIA)     Difficulty of Paying Living Expenses: Not very hard   Food Insecurity: No Food Insecurity (4/4/2024)    Hunger Vital Sign     Worried About Running Out of Food in the Last Year: Never true     Ran Out of Food in the Last Year: Never true   Transportation Needs: No Transportation Needs (4/4/2024)    PRAPARE - Transportation     Lack of Transportation (Medical): No     Lack of Transportation (Non-Medical): No   Physical Activity: Unknown (4/4/2024)    Exercise Vital Sign     Days of Exercise per Week: 3 days   Stress: Stress Concern Present (4/4/2024)    [employer] - Occupational Stress Questionnaire     Feeling of Stress : To some extent   Housing Stability: Low Risk  (4/4/2024)    Housing Stability Vital Sign     Unable to Pay for Housing in the Last Year: No     Number of Places Lived in the Last Year: 1     Unstable Housing in the Last Year: No         Medications/Allergies: See med card    Vitals:    12/16/24 0912   Weight: 74.8 kg (164 lb 14.5 oz)   Height: 5' 1" (1.549 m)   PainSc:   3   PainLoc: Back         Physical exam:    GENERAL: A and O x3, the patient appears well groomed and is in no acute distress.  Skin: No rashes or obvious lesions  HEENT: normocephalic, atraumatic  CARDIOVASCULAR:  Palpable peripheral pulses  LUNGS: easy work of breathing  ABDOMEN: soft, nontender   UPPER EXTREMITIES: Normal alignment, normal range of motion, no atrophy, no skin changes,  hair growth and nail growth normal and equal bilaterally. No swelling, no tenderness.    LOWER EXTREMITIES:  Normal alignment, normal range of motion, no atrophy, no skin changes,  hair growth and nail growth normal and equal bilaterally. No swelling, no tenderness.  CERVICAL " SPINE:  Cervical spine: ROM is limited in flexion, extension and lateral rotation with moderate increased pain.  Spurling's maneuver causes no neck pain to either side.  Myofascial exam: No Tenderness to palpation across cervical paraspinous region bilaterally.    LUMBAR SPINE  Lumbar spine: ROM is limited with flexion extension and oblique extension with moderate increased pain.    Dion's test causes no increased pain on either side.    Supine straight leg raise is negative bilaterally.  +femoral stretch  Internal and external rotation of the hip causes no increased pain on either side.  Myofascial exam: No tenderness to palpation across lumbar paraspinous muscles.      MENTAL STATUS: normal orientation, speech, language, and fund of knowledge for social situation.  Emotional state appropriate.    CRANIAL NERVES:  II:  PERRL bilaterally,   III,IV,VI: EOMI.    V:  Facial sensation equal bilaterally  VII:  Facial motor function normal.  VIII:  Hearing equal to finger rub bilaterally  IX/X: Gag normal, palate symmetric  XI:  Shoulder shrug equal, head turn equal  XII:  Tongue midline without fasciculations      MOTOR: Tone and bulk: normal bilateral upper and lower Strength: normal   SENSATION: Light touch and pinprick intact bilaterally  REFLEXES: normal, symmetric, nonbrisk.  Toes down, no clonus. No hoffmans.  GAIT: normal rise, base, steps, and arm swing.        Imaging:  MRI C-spine 11/2024  C3-C4: Anterolisthesis with uncovering the disc.  Posterior disc osteophyte complex, asymmetric to the left.  Severe left and mild right facet arthropathy.  Marked left uncovertebral joint spurring.  Severe left neural foraminal stenosis.  Ligamentum flavum thickening.  Mild spinal canal stenosis.     C4-C5: Anterolisthesis with uncovering of the disc.  Posterior disc osteophyte complex.  Moderate right and mild left facet arthropathy.  Moderate right and mild left uncovertebral joint spurring.  Moderate right and mild  left neural foraminal stenosis.  Ligamentum flavum thickening.  Mild spinal canal stenosis.     C5-C6: Anterolisthesis with uncovering the disc.  Posterior disc osteophyte complex.  Moderate left and mild right neural facet arthropathy.  Moderate bilateral uncovertebral joint spurring.  Moderate right and mild left neural foraminal stenosis.  Ligamentum flavum thickening.  Mild spinal canal stenosis.     C6-C7: Posterior disc osteophyte complex.  Mild bilateral facet arthropathy.  Moderate left and mild right uncovertebral joint spurring.  Mild-to-moderate left neural foraminal stenosis.  No significant spinal canal stenosis.     C7-T1: Mild posterior disc osteophyte complex.  Mild bilateral facet arthropathy.  Mild bilateral uncovertebral joint spurring.  Mild left greater than right neural foraminal stenosis.  No significant spinal canal stenosis.    MRI T-spine 11/2024  Impression:     1. Multilevel moderate to severe degenerative changes of the thoracic spine, as detailed above, most pronounced at T11-12 and contributing to moderate spinal canal and mild neural foraminal stenosis.  2. Moderate neural foraminal and mild spinal canal stenosis at T1-2.  Mild neural foraminal and spinal canal stenosis elsewhere in the thoracic spine.      MRI L-spine 11/2024  Vertebral column: Postsurgical changes of posterior lumbar interbody fusion at L4-5 with associated susceptibility artifact.  Decompressive laminectomy at this level.  No acute fracture is identified; however, if trauma is suspected, a CT scan would be a more sensitive examination for fractures. No evidence of an aggressive marrow replacement process.  Severe intervertebral disc space narrowing asymmetrically on the right with vacuum disc phenomenon, and Modic type 2 degenerative endplate changes with Schmorl's nodes and marginal osteophyte formation.     Cord: Normal.  Conus terminates at L2.     Degenerative findings:     T12-L1: Mild circumferential disc  bulge with small central annular fissure.  Mild bilateral facet arthropathy and ligamentum flavum thickening.  No significant neural foraminal or spinal canal stenosis.     L1-L2: Left foraminal/extraforaminal disc protrusion.  Mild bilateral facet arthropathy and ligamentum flavum thickening.  Mild left neural foraminal stenosis.  No spinal canal stenosis.     L2-L3: Mild circumferential disc bulge, asymmetric to the left.  Mild bilateral facet arthropathy.  No significant neural foraminal or spinal canal stenosis.     L3-L4: Severe right asymmetric disc space narrowing.  Retrolisthesis.  Circumferential disc bulge with osteophytic ridging, asymmetric to the right.  Moderate bilateral facet arthropathy.  Severe right and mild left neural foraminal stenosis.  Mild spinal canal stenosis.  Mild-to-moderate right lateral recess stenosis.     L4-L5: Postoperative changes, as above.   No significant neural foraminal or spinal canal stenosis.     L5-S1: Mild to moderate left asymmetric disc space narrowing.  Circumferential disc bulge, asymmetric to the left.  Severe bilateral facet arthropathy with bilateral facet effusions.  Ligamentum flavum thickening.  Moderate left and mild right neural foraminal stenosis.  No significant spinal canal stenosis.    Assessment:  Patient presents with lower back pain.  1. Other spondylosis, lumbar region    2. Lumbar radiculopathy    3. Degeneration of intervertebral disc of lumbar region, unspecified whether pain present    4. DDD (degenerative disc disease), cervical    5. DDD (degenerative disc disease), thoracic          Plan:  I have stressed the importance of physical activity and exercise to improve overall health  Reviewed pertinent imaging and records with patient  I think that the patient's back pain and radicular leg symptoms are due to degenerative disc disease and have recommended a lumbar epidural steroid injection to the L3-4 level(s).  May consider thoracic EVELIO in the  future  Start gabapentin 300 mg t.i.d. for anti neuropathic adjunct.  Follow-up after procedure    Thank you for referring this interesting patient, and I look forward to continuing to collaborate in her care.

## 2024-12-16 NOTE — TELEPHONE ENCOUNTER
Types of orders made on 12/16/2024: Outpatient Referral, Procedure Request      Order Date:12/16/2024   Ordering User:PHIL CALDWELL [[id number]]   Encounter Provider:Phil Caldwell MD [[id number]]   Authorizing Provider: Phil Caldwell MD [[id number]]   Department:[facility] PAIN MANAGEMENT[[id number]]      Common Order Information   Procedure -> Epidural Injection (specify level) Cmt: l3-4      Order Specific Information   Order: Procedure Order to Pain Management [Custom: [id number]]  Order #:          [id number]: 1 FUTURE     Priority: Routine  Class: Clinic Performed     Future Order Information       Expires on:12/16/2025            Expected by:12/16/2024                   Associated Diagnoses       M54.16 Lumbar radiculopathy       Facility Name: -> [facility]              Priority: Routine  Class: Clinic Performed     Future Order Information       Expires on:12/16/2025            Expected by:12/16/2024                   Associated Diagnoses       M54.16 Lumbar radiculopathy       Procedure -> Epidural Injection (specify level) Cmt: l3-4

## 2025-01-09 ENCOUNTER — HOSPITAL ENCOUNTER (OUTPATIENT)
Facility: HOSPITAL | Age: 65
Discharge: HOME | End: 2025-01-09
Attending: ANESTHESIOLOGY | Admitting: ANESTHESIOLOGY
Payer: MEDICARE

## 2025-01-09 DIAGNOSIS — M54.16 LUMBAR RADICULITIS: ICD-10-CM

## 2025-01-09 PROCEDURE — 25000003 PHARM REV CODE 250: Performed by: ANESTHESIOLOGY

## 2025-01-09 PROCEDURE — 62323 NJX INTERLAMINAR LMBR/SAC: CPT | Mod: ,,, | Performed by: ANESTHESIOLOGY

## 2025-01-09 PROCEDURE — 62323 NJX INTERLAMINAR LMBR/SAC: CPT | Performed by: ANESTHESIOLOGY

## 2025-01-09 PROCEDURE — 63600175 PHARM REV CODE 636 W HCPCS: Performed by: ANESTHESIOLOGY

## 2025-01-09 PROCEDURE — A4216 STERILE WATER/SALINE, 10 ML: HCPCS | Performed by: ANESTHESIOLOGY

## 2025-01-09 PROCEDURE — 25500020 PHARM REV CODE 255: Performed by: ANESTHESIOLOGY

## 2025-01-09 RX ORDER — SODIUM CHLORIDE 9 MG/ML
INJECTION, SOLUTION INTRAMUSCULAR; INTRAVENOUS; SUBCUTANEOUS
Status: DISCONTINUED | OUTPATIENT
Start: 2025-01-09 | End: 2025-01-22 | Stop reason: HOSPADM

## 2025-01-09 RX ORDER — SODIUM CHLORIDE, SODIUM LACTATE, POTASSIUM CHLORIDE, CALCIUM CHLORIDE 600; 310; 30; 20 MG/100ML; MG/100ML; MG/100ML; MG/100ML
INJECTION, SOLUTION INTRAVENOUS CONTINUOUS
Status: DISCONTINUED | OUTPATIENT
Start: 2025-01-09 | End: 2025-01-22 | Stop reason: HOSPADM

## 2025-01-09 RX ORDER — LIDOCAINE HYDROCHLORIDE 10 MG/ML
1 INJECTION, SOLUTION EPIDURAL; INFILTRATION; INTRACAUDAL; PERINEURAL ONCE
Status: DISCONTINUED | OUTPATIENT
Start: 2025-01-09 | End: 2025-01-22 | Stop reason: HOSPADM

## 2025-01-09 RX ORDER — LIDOCAINE HYDROCHLORIDE 10 MG/ML
INJECTION, SOLUTION EPIDURAL; INFILTRATION; INTRACAUDAL; PERINEURAL
Status: DISCONTINUED | OUTPATIENT
Start: 2025-01-09 | End: 2025-01-22 | Stop reason: HOSPADM

## 2025-01-09 RX ORDER — DEXAMETHASONE SODIUM PHOSPHATE 10 MG/ML
INJECTION INTRAMUSCULAR; INTRAVENOUS
Status: DISCONTINUED | OUTPATIENT
Start: 2025-01-09 | End: 2025-01-22 | Stop reason: HOSPADM

## 2025-01-09 RX ORDER — ALPRAZOLAM 0.25 MG/1
1 TABLET, ORALLY DISINTEGRATING ORAL ONCE AS NEEDED
Status: DISCONTINUED | OUTPATIENT
Start: 2025-01-09 | End: 2025-01-22 | Stop reason: HOSPADM

## 2025-01-09 NOTE — PLAN OF CARE
Pre-op complete.   at bedside. Text notifications initiated. Pt denies need for safe. Belongings on stretcher.

## 2025-01-09 NOTE — PLAN OF CARE
Patient discharging home, no signs of distress at this time, VS stable, discharge instructions given and patient verbalizes understanding, patient leaving the floor assisted by staff member.

## 2025-01-09 NOTE — OP NOTE
PROCEDURE DATE: 1/9/2025    Procedure:   Interlaminar epidural steroid injection at L3-4 under fluoroscopic guidance.    Diagnosis: lUMBAR radiculitis  pOSTOP DIAGNOSIS: sAME    Physician: Shaka Caldwell M.D.    Medications injected:10 mg dexamethasone with 4 ml of preservative free NaCl    Local anesthetic injected:    Lidocaine 1% 2 ml total    Sedation Medications: None    Estimated blood loss:  None    Complications:  None    Technique:  Time-out taken to identify patient and procedure prior to starting the procedure.  With the patient laying in a prone position, the area was prepped and draped in the usual sterile fashion using ChloraPrep and a fenestrated drape.  After determining the target level with an AP fluoroscopic view, local anesthetic was given using a 25-gauge 1.5 inch needle by raising a wheal and then infiltrating toward the interlaminar entry space.  A 3.5inch 20-gauge Touhy needle was introduced under AP fluoroscopic guidance to the interlaminar space of L3-4. Once the trajectory was established, the needle was visualized in the lateral view and advanced using loss of resistance technique. Once in the desired position, 1ml contrast was injected to confirm placement and there was no vascular uptake nor intrathecal spread.  The medication was then injected slowly. The patient tolerated the procedure well.      The patient was monitored after the procedure.   They were given post-procedure and discharge instructions to follow at home.  The patient was discharged in a stable condition.

## 2025-01-09 NOTE — DISCHARGE SUMMARY
[facility] - Periop Services  Discharge Note  Short Stay    Procedure(s) (LRB):  Injection-steroid-epidural-lumbar l3-4 (N/A)      OUTCOME: Patient tolerated treatment/procedure well without complication and is now ready for discharge.    DISPOSITION: Home or Self Care    FINAL DIAGNOSIS:  <principal problem not specified>    FOLLOWUP: In clinic    DISCHARGE INSTRUCTIONS:    Discharge Procedure Orders   Notify your health care provider if you experience any of the following:  temperature >100.4     Notify your health care provider if you experience any of the following:  severe uncontrolled pain     Notify your health care provider if you experience any of the following:  redness, tenderness, or signs of infection (pain, swelling, redness, odor or green/yellow discharge around incision site)     Activity as tolerated        TIME SPENT ON DISCHARGE: 30 minutes

## 2025-01-10 VITALS
RESPIRATION RATE: 16 BRPM | SYSTOLIC BLOOD PRESSURE: 139 MMHG | HEIGHT: 61 IN | WEIGHT: 164 LBS | DIASTOLIC BLOOD PRESSURE: 77 MMHG | HEART RATE: 59 BPM | TEMPERATURE: 97 F | OXYGEN SATURATION: 100 % | BODY MASS INDEX: 30.96 KG/M2

## 2025-02-06 ENCOUNTER — OFFICE VISIT (OUTPATIENT)
Dept: PAIN MEDICINE | Facility: CLINIC | Age: 65
End: 2025-02-06
Payer: MEDICARE

## 2025-02-06 VITALS — DIASTOLIC BLOOD PRESSURE: 73 MMHG | SYSTOLIC BLOOD PRESSURE: 116 MMHG | HEART RATE: 87 BPM

## 2025-02-06 DIAGNOSIS — M47.896 OTHER SPONDYLOSIS, LUMBAR REGION: ICD-10-CM

## 2025-02-06 DIAGNOSIS — M51.369 DEGENERATION OF INTERVERTEBRAL DISC OF LUMBAR REGION, UNSPECIFIED WHETHER PAIN PRESENT: ICD-10-CM

## 2025-02-06 DIAGNOSIS — M51.34 DDD (DEGENERATIVE DISC DISEASE), THORACIC: ICD-10-CM

## 2025-02-06 DIAGNOSIS — M54.16 LUMBAR RADICULOPATHY: Primary | ICD-10-CM

## 2025-02-06 DIAGNOSIS — M50.30 DDD (DEGENERATIVE DISC DISEASE), CERVICAL: ICD-10-CM

## 2025-02-06 PROCEDURE — 99214 OFFICE O/P EST MOD 30 MIN: CPT | Mod: S$PBB,,, | Performed by: PHYSICIAN ASSISTANT

## 2025-02-06 PROCEDURE — 99999 PR PBB SHADOW E&M-EST. PATIENT-LVL III: CPT | Mod: PBBFAC,,, | Performed by: PHYSICIAN ASSISTANT

## 2025-02-06 PROCEDURE — 99213 OFFICE O/P EST LOW 20 MIN: CPT | Mod: PBBFAC,PN | Performed by: PHYSICIAN ASSISTANT

## 2025-02-06 RX ORDER — GABAPENTIN 300 MG/1
300 CAPSULE ORAL 3 TIMES DAILY
Qty: 90 CAPSULE | Refills: 1 | Status: SHIPPED | OUTPATIENT
Start: 2025-02-06 | End: 2025-04-07

## 2025-02-06 NOTE — PROGRESS NOTES
Referring Physician: No ref. provider found    PCP: Chey, Primary Doctor      CC: lower back pain    Interval History:  Carleen Kincaid is a 64 y.o. female with chronic low back pain who presents today for f/u s/p lumbar EVELIO at L3-4. Reports >80% relief. She is happy with results. Currently taking Gabapentin 300 mg daily with relief. Prescription is for TID but at this time pain is well controlled. She denies any worsening weakness or b/b changes. Pain today is rated 1/10.  Pt has been seen in the clinic before, however pt is new to me.     History below per Dr. Caldwell    HPI:   Carleen Kincaid is a 64 y.o. female referred to us for lower back pain.  Patient with history of L4-5 fusion in 2014.  She states having continued lower back pain for over 5 years.  Pain is a constant aching, throbbing pain in his lower back.  Pain occasionally radiates down her left anterior thigh.  She has tingling in her whole posterior thighs as well.  Pain worsens with sitting, standing, lying, walking getting up.  Pain improves with rest.  She recently seen by Neurosurgery.  She had MRIs of the cervical, thoracic and lumbar spine.  She does have some ongoing midback pain as well as neck pain.  Lower back pain is more bothersome.  She has tried physical therapy with minimal benefit.  She underwent a lumbar epidural steroid injection in 2022 which provided over 50% benefit for over a year.  He desires repeat procedure with us.  She denies any worsening weakness.  No bowel bladder changes.    ROS:  CONSTITUTIONAL: No fevers, chills, night sweats, wt. loss, appetite changes  SKIN: no rashes or itching  ENT: No headaches, head trauma, vision changes, or eye pain  LYMPH NODES: None noticed   CV: No chest pain, palpitations.   RESP: No shortness of breath, dyspnea on exertion, cough, wheezing, or hemoptysis  GI: No nausea, emesis, diarrhea, constipation, melena, hematochezia, pain.    : No dysuria, hematuria, urgency, or frequency   HEME: No  easy bruising, bleeding problems  PSYCHIATRIC: No depression, anxiety, psychosis, hallucinations.  NEURO: No seizures, memory loss, dizziness or difficulty sleeping  MSK: +HPI      Past Medical History:   Diagnosis Date    Anxiety     Degenerative disc disease, cervical     Depression     General anesthetics causing adverse effect in therapeutic use     NO DEMEROL    Hyperlipidemia     Hypertension     Internal derangement of right knee 2/13/2017    Joint pain 11/12/2014    Kidney stones     Knee pain 2/13/2017    Psoriasis 1983    Right knee pain 11/16/2015    Stroke     retinal stroke     Past Surgical History:   Procedure Laterality Date    APPENDECTOMY      EPIDURAL STEROID INJECTION N/A 7/13/2022    Procedure: Injection, Steroid, Epidural;  Surgeon: Shaka Caldwell MD;  Location: [facility] OR;  Service: Pain Management;  Laterality: N/A;  L3-4 (dr Gonzalez)     EPIDURAL STEROID INJECTION INTO LUMBAR SPINE N/A 1/9/2025    Procedure: Injection-steroid-epidural-lumbar;  Surgeon: Shaka Caldwell MD;  Location: [facility] OR;  Service: Pain Management;  Laterality: N/A;    HYSTERECTOMY      partial    JOINT REPLACEMENT Bilateral     Knees    KNEE ARTHROSCOPY Right 8/27/2015    Dr Roberts    lithtotrispy      LUMBAR SPINE SURGERY  10/2014    L4-L5 fusion    REVISION OF KNEE ARTHROPLASTY Left 4/5/2022    Procedure: REVISION, ARTHROPLASTY, KNEE;  Surgeon: Sri Roberts MD;  Location: [facility] OR;  Service: Orthopedics;  Laterality: Left;  general, regional w catheter, adductor, epidural, spinal, ester 50cc     Family History   Problem Relation Name Age of Onset    Thyroid disease Mother      Cancer Mother  55        lymphoma    Paget's disease of bone Mother      Psoriasis Father      Cancer Father  82        Bladder cancer    Diabetes Mellitus Brother      Heart disease Brother      Cancer Brother          prostate    Melanoma Brother      Bipolar disorder Daughter      Kidney disease Daughter          reflux    Psoriasis Maternal  Grandmother      Diabetes Mellitus Brother      Heart disease Brother      Cancer Brother          prostate    Melanoma Brother      Diabetes Mellitus Brother      Heart disease Brother      Cancer Brother          prostate    Melanoma Brother      Lupus Neg Hx      Inflammatory bowel disease Neg Hx      Rheum arthritis Neg Hx       Social History     Socioeconomic History    Marital status:      Spouse name: Jose    Number of children: 3   Tobacco Use    Smoking status: Never    Smokeless tobacco: Never    Tobacco comments:     ; ; 3 children   Substance and Sexual Activity    Alcohol use: Yes     Comment: rarely    Drug use: No   Social History Narrative    Stairs-      Social Drivers of Health     Financial Resource Strain: Low Risk  (4/4/2024)    Overall Financial Resource Strain (CARDIA)     Difficulty of Paying Living Expenses: Not very hard   Food Insecurity: No Food Insecurity (4/4/2024)    Hunger Vital Sign     Worried About Running Out of Food in the Last Year: Never true     Ran Out of Food in the Last Year: Never true   Transportation Needs: No Transportation Needs (4/4/2024)    PRAPARE - Transportation     Lack of Transportation (Medical): No     Lack of Transportation (Non-Medical): No   Physical Activity: Unknown (4/4/2024)    Exercise Vital Sign     Days of Exercise per Week: 3 days   Stress: Stress Concern Present (4/4/2024)    [employer] [place] [employer] - Occupational Stress Questionnaire     Feeling of Stress : To some extent   Housing Stability: Low Risk  (4/4/2024)    Housing Stability Vital Sign     Unable to Pay for Housing in the Last Year: No     Number of Places Lived in the Last Year: 1     Unstable Housing in the Last Year: No         Medications/Allergies: See med card    Vitals:    02/06/25 1412   BP: 116/73   Pulse: 87   PainSc: 10-Worst pain ever   PainLoc: Back         Physical exam:    GENERAL: A and O x3, the patient appears well  groomed and is in no acute distress.  Skin: No rashes or obvious lesions  HEENT: normocephalic, atraumatic  CARDIOVASCULAR:  RRR  LUNGS: non labored breathing  ABDOMEN: soft, nontender   UPPER EXTREMITIES: Normal alignment, normal range of motion, no atrophy, no skin changes,  hair growth and nail growth normal and equal bilaterally. No swelling, no tenderness.    LOWER EXTREMITIES:  Normal alignment, normal range of motion, no atrophy, no skin changes,  hair growth and nail growth normal and equal bilaterally. No swelling, no tenderness.  CERVICAL SPINE:  Cervical spine: ROM is limited in flexion, extension and lateral rotation with moderate increased pain.  Spurling's maneuver causes no neck pain to either side.  Myofascial exam: No Tenderness to palpation across cervical paraspinous region bilaterally.    LUMBAR SPINE  Lumbar spine: ROM is limited with flexion extension and oblique extension with moderate increased pain.    Dion's test causes no increased pain on either side.    Supine straight leg raise is negative bilaterally.  +femoral stretch  Internal and external rotation of the hip causes no increased pain on either side.  Myofascial exam: No tenderness to palpation across lumbar paraspinous muscles.      MENTAL STATUS: normal orientation, speech, language, and fund of knowledge for social situation.  Emotional state appropriate.    CRANIAL NERVES:  II:  PERRL bilaterally,   III,IV,VI: EOMI.    V:  Facial sensation equal bilaterally  VII:  Facial motor function normal.  VIII:  Hearing equal to finger rub bilaterally  IX/X: Gag normal, palate symmetric  XI:  Shoulder shrug equal, head turn equal  XII:  Tongue midline without fasciculations      MOTOR: Tone and bulk: normal bilateral upper and lower Strength: normal   SENSATION: Light touch and pinprick intact bilaterally  REFLEXES: normal, symmetric, nonbrisk.  Toes down, no clonus. No hoffmans.  GAIT: normal rise, base, steps, and arm swing.         Imaging:  MRI C-spine 11/2024  C3-C4: Anterolisthesis with uncovering the disc.  Posterior disc osteophyte complex, asymmetric to the left.  Severe left and mild right facet arthropathy.  Marked left uncovertebral joint spurring.  Severe left neural foraminal stenosis.  Ligamentum flavum thickening.  Mild spinal canal stenosis.     C4-C5: Anterolisthesis with uncovering of the disc.  Posterior disc osteophyte complex.  Moderate right and mild left facet arthropathy.  Moderate right and mild left uncovertebral joint spurring.  Moderate right and mild left neural foraminal stenosis.  Ligamentum flavum thickening.  Mild spinal canal stenosis.     C5-C6: Anterolisthesis with uncovering the disc.  Posterior disc osteophyte complex.  Moderate left and mild right neural facet arthropathy.  Moderate bilateral uncovertebral joint spurring.  Moderate right and mild left neural foraminal stenosis.  Ligamentum flavum thickening.  Mild spinal canal stenosis.     C6-C7: Posterior disc osteophyte complex.  Mild bilateral facet arthropathy.  Moderate left and mild right uncovertebral joint spurring.  Mild-to-moderate left neural foraminal stenosis.  No significant spinal canal stenosis.     C7-T1: Mild posterior disc osteophyte complex.  Mild bilateral facet arthropathy.  Mild bilateral uncovertebral joint spurring.  Mild left greater than right neural foraminal stenosis.  No significant spinal canal stenosis.    MRI T-spine 11/2024  Impression:     1. Multilevel moderate to severe degenerative changes of the thoracic spine, as detailed above, most pronounced at T11-12 and contributing to moderate spinal canal and mild neural foraminal stenosis.  2. Moderate neural foraminal and mild spinal canal stenosis at T1-2.  Mild neural foraminal and spinal canal stenosis elsewhere in the thoracic spine.      MRI L-spine 11/2024  Vertebral column: Postsurgical changes of posterior lumbar interbody fusion at L4-5 with associated  susceptibility artifact.  Decompressive laminectomy at this level.  No acute fracture is identified; however, if trauma is suspected, a CT scan would be a more sensitive examination for fractures. No evidence of an aggressive marrow replacement process.  Severe intervertebral disc space narrowing asymmetrically on the right with vacuum disc phenomenon, and Modic type 2 degenerative endplate changes with Schmorl's nodes and marginal osteophyte formation.     Cord: Normal.  Conus terminates at L2.     Degenerative findings:     T12-L1: Mild circumferential disc bulge with small central annular fissure.  Mild bilateral facet arthropathy and ligamentum flavum thickening.  No significant neural foraminal or spinal canal stenosis.     L1-L2: Left foraminal/extraforaminal disc protrusion.  Mild bilateral facet arthropathy and ligamentum flavum thickening.  Mild left neural foraminal stenosis.  No spinal canal stenosis.     L2-L3: Mild circumferential disc bulge, asymmetric to the left.  Mild bilateral facet arthropathy.  No significant neural foraminal or spinal canal stenosis.     L3-L4: Severe right asymmetric disc space narrowing.  Retrolisthesis.  Circumferential disc bulge with osteophytic ridging, asymmetric to the right.  Moderate bilateral facet arthropathy.  Severe right and mild left neural foraminal stenosis.  Mild spinal canal stenosis.  Mild-to-moderate right lateral recess stenosis.     L4-L5: Postoperative changes, as above.   No significant neural foraminal or spinal canal stenosis.     L5-S1: Mild to moderate left asymmetric disc space narrowing.  Circumferential disc bulge, asymmetric to the left.  Severe bilateral facet arthropathy with bilateral facet effusions.  Ligamentum flavum thickening.  Moderate left and mild right neural foraminal stenosis.  No significant spinal canal stenosis.    Assessment:  Carleen Kincaid is a 64 y.o. female with lower back pain.  1. Lumbar radiculopathy    2. Degeneration  of intervertebral disc of lumbar region, unspecified whether pain present    3. Other spondylosis, lumbar region    4. DDD (degenerative disc disease), cervical    5. DDD (degenerative disc disease), thoracic        Plan:  I have stressed the importance of physical activity and exercise to improve overall health  Reviewed pertinent imaging and records with patient  Monitor progress from lumbar epidural steroid injection to the L3-4 level(s).  May consider thoracic EVELIO in the future  Continue Gabapentin 300 mg ok to titrate to t.i.d. for anti neuropathic adjunct as needed.  Follow-up prn

## 2025-05-05 ENCOUNTER — OFFICE VISIT (OUTPATIENT)
Dept: NEUROSURGERY | Facility: CLINIC | Age: 65
End: 2025-05-05
Payer: MEDICARE

## 2025-05-05 ENCOUNTER — HOSPITAL ENCOUNTER (EMERGENCY)
Facility: HOSPITAL | Age: 65
Discharge: HOME | End: 2025-05-05
Attending: EMERGENCY MEDICINE
Payer: MEDICARE

## 2025-05-05 VITALS
BODY MASS INDEX: 34.55 KG/M2 | SYSTOLIC BLOOD PRESSURE: 144 MMHG | RESPIRATION RATE: 16 BRPM | HEART RATE: 65 BPM | TEMPERATURE: 98 F | WEIGHT: 176 LBS | DIASTOLIC BLOOD PRESSURE: 86 MMHG | OXYGEN SATURATION: 98 % | HEIGHT: 60 IN

## 2025-05-05 DIAGNOSIS — R11.0 NAUSEA: ICD-10-CM

## 2025-05-05 DIAGNOSIS — M47.816 SPONDYLOSIS OF LUMBAR SPINE: ICD-10-CM

## 2025-05-05 DIAGNOSIS — M54.16 LUMBAR RADICULOPATHY: ICD-10-CM

## 2025-05-05 DIAGNOSIS — M54.41 MIDLINE LOW BACK PAIN WITH BILATERAL SCIATICA, UNSPECIFIED CHRONICITY: ICD-10-CM

## 2025-05-05 DIAGNOSIS — M48.07 SPINAL STENOSIS, LUMBOSACRAL REGION: ICD-10-CM

## 2025-05-05 DIAGNOSIS — M54.42 MIDLINE LOW BACK PAIN WITH BILATERAL SCIATICA, UNSPECIFIED CHRONICITY: ICD-10-CM

## 2025-05-05 DIAGNOSIS — M54.9 DORSALGIA, UNSPECIFIED: Primary | ICD-10-CM

## 2025-05-05 LAB
HCV AB SERPL QL IA: NORMAL
HIV 1+2 AB+HIV1 P24 AG SERPL QL IA: NORMAL

## 2025-05-05 PROCEDURE — 99999 PR PBB SHADOW E&M-EST. PATIENT-LVL III: CPT | Mod: PBBFAC,,, | Performed by: NURSE PRACTITIONER

## 2025-05-05 PROCEDURE — 96375 TX/PRO/DX INJ NEW DRUG ADDON: CPT

## 2025-05-05 PROCEDURE — 25000003 PHARM REV CODE 250: Performed by: EMERGENCY MEDICINE

## 2025-05-05 PROCEDURE — 86803 HEPATITIS C AB TEST: CPT | Performed by: PHYSICIAN ASSISTANT

## 2025-05-05 PROCEDURE — 96374 THER/PROPH/DIAG INJ IV PUSH: CPT

## 2025-05-05 PROCEDURE — 99213 OFFICE O/P EST LOW 20 MIN: CPT | Mod: PBBFAC | Performed by: NURSE PRACTITIONER

## 2025-05-05 PROCEDURE — 99285 EMERGENCY DEPT VISIT HI MDM: CPT | Mod: 25,27

## 2025-05-05 PROCEDURE — 87389 HIV-1 AG W/HIV-1&-2 AB AG IA: CPT | Performed by: PHYSICIAN ASSISTANT

## 2025-05-05 PROCEDURE — 99213 OFFICE O/P EST LOW 20 MIN: CPT | Mod: S$PBB,,, | Performed by: NURSE PRACTITIONER

## 2025-05-05 PROCEDURE — 63600175 PHARM REV CODE 636 W HCPCS: Performed by: EMERGENCY MEDICINE

## 2025-05-05 RX ORDER — MORPHINE SULFATE 4 MG/ML
4 INJECTION, SOLUTION INTRAMUSCULAR; INTRAVENOUS
Refills: 0 | Status: COMPLETED | OUTPATIENT
Start: 2025-05-05 | End: 2025-05-05

## 2025-05-05 RX ORDER — KETOROLAC TROMETHAMINE 30 MG/ML
15 INJECTION, SOLUTION INTRAMUSCULAR; INTRAVENOUS
Status: COMPLETED | OUTPATIENT
Start: 2025-05-05 | End: 2025-05-05

## 2025-05-05 RX ORDER — ONDANSETRON 4 MG/1
4 TABLET, FILM COATED ORAL EVERY 6 HOURS PRN
Qty: 20 TABLET | Refills: 0 | Status: SHIPPED | OUTPATIENT
Start: 2025-05-05

## 2025-05-05 RX ORDER — DEXAMETHASONE SODIUM PHOSPHATE 4 MG/ML
8 INJECTION, SOLUTION INTRA-ARTICULAR; INTRALESIONAL; INTRAMUSCULAR; INTRAVENOUS; SOFT TISSUE
Status: COMPLETED | OUTPATIENT
Start: 2025-05-05 | End: 2025-05-05

## 2025-05-05 RX ORDER — GABAPENTIN 300 MG/1
600 CAPSULE ORAL ONCE
Status: COMPLETED | OUTPATIENT
Start: 2025-05-05 | End: 2025-05-05

## 2025-05-05 RX ADMIN — KETOROLAC TROMETHAMINE 15 MG: 30 INJECTION, SOLUTION INTRAMUSCULAR; INTRAVENOUS at 05:05

## 2025-05-05 RX ADMIN — GABAPENTIN 600 MG: 300 CAPSULE ORAL at 05:05

## 2025-05-05 RX ADMIN — MORPHINE SULFATE 4 MG: 4 INJECTION INTRAVENOUS at 05:05

## 2025-05-05 RX ADMIN — DEXAMETHASONE SODIUM PHOSPHATE 8 MG: 4 INJECTION INTRA-ARTICULAR; INTRALESIONAL; INTRAMUSCULAR; INTRAVENOUS; SOFT TISSUE at 05:05

## 2025-05-05 NOTE — ED NOTES
"Patient states chronic back pain that radiates down leg with " burning " pain to right thigh, taking Norco and Robaxin, has had epidurals and mult other interventions for same   "

## 2025-05-05 NOTE — FIRST PROVIDER EVALUATION
Medical screening examination initiated.  I have conducted a focused provider triage encounter, findings are as follows:    Brief history of present illness:  Sent from [facility] clinic.  Back pain.  No leg weakness or bowel bladder changes    Reviewed EPIC, do not see note    There were no vitals filed for this visit.    Pertinent physical exam:  uncomfortable, moving legs    Brief workup plan:  Defer to next team    Preliminary workup initiated; this workup will be continued and followed by the physician or advanced practice provider that is assigned to the patient when roomed.
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
## 2025-05-05 NOTE — ED PROVIDER NOTES
Encounter Date: 5/5/2025       History     Chief Complaint   Patient presents with    Back Pain     Sent from neuro surg clinic, hx back surgs, more pain denies bowel/bladder incontinence     64-year-old female with past medical history of anxiety, HLD, HTN, s/p L4-5 posterior fusion in 2014 who presents to the emergency department complaining of chronic back pain radiating down BLE (R>L), states she feels like it has been worsening over the last week.  States by the end of the day, the pain is excruciating and her legs feel weak and heavy. She was evaluated by Neurosurgery today, who ordered an updated MRI. She reports she's been putting off surgery for awhile now, and is being followed by pain management for EVELIO. Her last EVELIO was 01/2025. Patient denies any saddle anesthesia, bowel/bladder incontinence, urinary retention, extremity weakness, and any other sxs at this time.         Review of patient's allergies indicates:  No Known Allergies  Past Medical History:   Diagnosis Date    Anxiety     Degenerative disc disease, cervical     Depression     General anesthetics causing adverse effect in therapeutic use     NO DEMEROL    Hyperlipidemia     Hypertension     Internal derangement of right knee 2/13/2017    Joint pain 11/12/2014    Kidney stones     Knee pain 2/13/2017    Psoriasis 1983    Right knee pain 11/16/2015    Stroke     retinal stroke     Past Surgical History:   Procedure Laterality Date    APPENDECTOMY      EPIDURAL STEROID INJECTION N/A 7/13/2022    Procedure: Injection, Steroid, Epidural;  Surgeon: Shaka Caldwell MD;  Location: [facility] OR;  Service: Pain Management;  Laterality: N/A;  L3-4 (dr Gonzalez)     EPIDURAL STEROID INJECTION INTO LUMBAR SPINE N/A 1/9/2025    Procedure: Injection-steroid-epidural-lumbar;  Surgeon: Shaka Caldwell MD;  Location: [facility] OR;  Service: Pain Management;  Laterality: N/A;    HYSTERECTOMY      partial    JOINT REPLACEMENT Bilateral     Knees    KNEE ARTHROSCOPY Right  8/27/2015    Dr Roberts    lithtotrispy      LUMBAR SPINE SURGERY  10/2014    L4-L5 fusion    REVISION OF KNEE ARTHROPLASTY Left 4/5/2022    Procedure: REVISION, ARTHROPLASTY, KNEE;  Surgeon: Sri Roberts MD;  Location: [facility];  Service: Orthopedics;  Laterality: Left;  general, regional w catheter, adductor, epidural, spinal, ester 50cc     Family History   Problem Relation Name Age of Onset    Thyroid disease Mother      Cancer Mother  55        lymphoma    Paget's disease of bone Mother      Psoriasis Father      Cancer Father  82        Bladder cancer    Diabetes Mellitus Brother      Heart disease Brother      Cancer Brother          prostate    Melanoma Brother      Bipolar disorder Daughter      Kidney disease Daughter          reflux    Psoriasis Maternal Grandmother      Diabetes Mellitus Brother      Heart disease Brother      Cancer Brother          prostate    Melanoma Brother      Diabetes Mellitus Brother      Heart disease Brother      Cancer Brother          prostate    Melanoma Brother      Lupus Neg Hx      Inflammatory bowel disease Neg Hx      Rheum arthritis Neg Hx       Social History[1]  Review of Systems    Physical Exam     Initial Vitals [05/05/25 1506]   BP Pulse Resp Temp SpO2   (!) 146/87 66 20 98.1 °F (36.7 °C) 100 %      MAP       --         Physical Exam    Nursing note and vitals reviewed.  Constitutional: She appears well-developed and well-nourished.   HENT:   Head: Normocephalic and atraumatic.   Eyes: EOM are normal.   Neck: Neck supple.   Normal range of motion.  Cardiovascular:  Normal rate and regular rhythm.           Pulmonary/Chest: No respiratory distress.   Abdominal: Abdomen is soft. There is no abdominal tenderness.   Musculoskeletal:         General: Normal range of motion.      Cervical back: Normal range of motion and neck supple.      Comments: Back: No tenderness. No midline bony tenderness, deformities, or step-offs of the T-spine or L-spine. No paraspinal  tenderness.  Neurological: Awake and alert. Appropriate for age. Positive straight leg raise bilaterally (R>L). No strength deficit; equal and 5/5 in bilateral upper and lower extremities. Normal gait. No acute focal neurological deficits noted.       Neurological: She is alert and oriented to person, place, and time. She has normal strength.   Skin: Skin is warm and dry. Capillary refill takes less than 2 seconds.         ED Course   Procedures  Labs Reviewed   HEPATITIS C ANTIBODY - Normal       Result Value    Hep C Ab Interp Non-Reactive     HIV 1 / 2 ANTIBODY - Normal    HIV 1/2 Ag/Ab Non-Reactive     HEP C VIRUS HOLD SPECIMEN          Imaging Results              MRI Lumbar Spine Without Contrast (Final result)  Result time 05/05/25 21:52:09      Final result by Subhash Hsu MD (05/05/25 21:52:09)                   Impression:      Postoperative change of L4-L5 posterior instrumented fusion and decompression.  No MR evidence of cauda equina type lesion.    Multilevel lumbar spondylosis, most pronounced at L3-L4 with mild spinal canal stenosis and moderate right neural foraminal narrowing.  Findings are similar to exam from 11/25/2024.    Electronically signed by resident: Yunior Lopez  Date:    05/05/2025  Time:    21:15    Electronically signed by: Subhash Hsu MD  Date:    05/05/2025  Time:    21:52               Narrative:    EXAMINATION:  MRI LUMBAR SPINE WITHOUT CONTRAST    CLINICAL HISTORY:  Low back pain, cauda equina syndrome suspected;    TECHNIQUE:  Multiplanar, multisequence MR images were acquired from the thoracolumbar junction to the sacrum without contrast.    COMPARISON:  Multiple priors, most recent lumbar spine 11/25/2024.    FINDINGS:  Postsurgical change of L4-L5 posterior instrumented fusion and decompression with interbody disc spacer in place.  Fibrotic changes in the paraspinal soft tissues at the operative level.  No organized collections.    Alignment: Mild levocurvature of spine.   Unchanged grade I anterolisthesis of L5 on S1, grade I retrolisthesis of L3 on L4 and slight anterolisthesis of L4 on L5.    Vertebrae: No fracture or marrow infiltrative process.  Modic type II endplate changes at L3-L4, similar.  No endplate edema.    Discs: Disc desiccation and height loss, worst at L3-L4, similar to the most recent prior.    Cord: Conus terminates at L1-L2 and appears unremarkable.  Cauda equina appears unremarkable.    Degenerative findings:    T12-L1: Small circumferential disc bulge and facet arthropathy.  No spinal canal stenosis or neuroforaminal narrowing.    L1-L2: Mild facet arthropathy.  No spinal canal stenosis or neuroforaminal narrowing.    L2-L3: Small circumferential disc bulge and moderate facet contributing to mild spinal canal stenosis.  No neural foraminal narrowing.    L3-L4: Circumferential disc bulge with broad-based right paracentral protrusion.  Moderate facet arthropathy.  Mild spinal canal stenosis.  Moderate right and mild left neural foraminal narrowing.    L4-L5: Operative level.  No spinal canal stenosis or neural foraminal narrowing.    L5-S1: Anterolisthesis with disc uncovering and bilateral facet arthropathy.  Small facet joint effusions.  No spinal canal stenosis.  Mild bilateral neural foraminal narrowing.    Paraspinal muscles & soft tissues: Fatty atrophy of the paraspinal musculature.  Colonic diverticulosis.                                       Medications   gabapentin capsule 600 mg (600 mg Oral Given 5/5/25 1740)   morphine injection 4 mg (4 mg Intravenous Given 5/5/25 1740)   dexAMETHasone injection 8 mg (8 mg Intravenous Given 5/5/25 1740)   ketorolac injection 15 mg (15 mg Intravenous Given 5/5/25 1740)     Medical Decision Making  64-year-old female with past medical history of anxiety, HLD, HTN, s/p lumbar fusion who is here for worsening chronic pain radiating down BLE (R>L). Positive straight leg raise (R>L). Strength is 5/5 and equal is  "bilateral upper and lower extremities    Based on available information and the initial assessment, the working differential diagnoses considered during this evaluation include but are not limited to spinal stenosis, cauda equina syndrome, acute muscle strain, fracture    Ordering MRI for subjective weakness, medicate, and will reassess    MRI: "Multilevel lumbar spondylosis, most pronounced at L3-L4 with mild spinal canal stenosis and moderate right neural foraminal narrowing.  Findings are similar to exam from 11/25/2024."    Discussed with patient and family all pertinent ED information and results. Discussed pt dx and plan of tx. Gave patient all f/u and return to the ED instructions. All questions and concerns were addressed at this time. Patient and family  expresses understanding of information and instructions, and is comfortable with plan to discharge. Pt is stable for discharge.    I discussed with patient and family that evaluation in the ED does not suggest any emergent or life threatening medical conditions requiring immediate intervention beyond what was provided in the ED, and I believe patient is safe for discharge.  Regardless, an unremarkable evaluation in the ED does not preclude the development or presence of a serious of life threatening condition. As such, it was instructed that the patient return immediately for any worsening or change in current symptoms.    Amount and/or Complexity of Data Reviewed  Labs: ordered.  Radiology: ordered.    Risk  Prescription drug management.                                      Clinical Impression:  Final diagnoses:  [R11.0] Nausea  [M54.41, M54.42] Midline low back pain with bilateral sciatica, unspecified chronicity  [M47.816] Spondylosis of lumbar spine          ED Disposition Condition    Discharge Stable          ED Prescriptions       Medication Sig Dispense Start Date End Date Auth. Provider    ondansetron (ZOFRAN) 4 MG tablet Take 1 tablet (4 mg total) " by mouth every 6 (six) hours as needed for Nausea. 20 tablet 5/5/2025 -- Marzena Machado PA-C          Follow-up Information       Follow up With Specialties Details Why Contact Info    [facility] - Emergency Dept Emergency Medicine Go to  If symptoms worsen [id number]  [facility] [address] [phone]                 [1]   Social History  Tobacco Use    Smoking status: Never    Smokeless tobacco: Never    Tobacco comments:     ; ; 3 children   Substance Use Topics    Alcohol use: Yes     Comment: rarely    Drug use: No        Marzena Machado PA-C  05/06/25 0016

## 2025-05-05 NOTE — ED NOTES
Patient identifiers verified and correct for  MS Kincaid   C/C:  BAck pain SEE NN  APPEARANCE: awake and alert in NAD. PAIN  10/10  SKIN: warm, dry and intact. No breakdown or bruising.  MUSCULOSKELETAL: Patient moving all extremities spontaneously, no obvious swelling or deformities noted. Ambulates independently.  RESPIRATORY: Denies shortness of breath.Respirations unlabored.   CARDIAC: Denies CP, 2+ distal pulses; no peripheral edema  ABDOMEN: S/ND/NT, Denies nausea  : voids spontaneously, denies difficulty  Neurologic: AAO x 4; follows commands equal strength in all extremities; denies numbness/tingling. Denies dizziness  Denies new weakness, reports right lower back pain that radiates down leg

## 2025-05-06 NOTE — DISCHARGE INSTRUCTIONS
Your MRI findings are similar to your last MRI in November 2024.  Please follow-up with your neurosurgeon and pain management doctor. If your symptoms worsen or you develop any new symptoms, please report to emergency department for further evaluation.

## 2025-05-12 ENCOUNTER — PATIENT MESSAGE (OUTPATIENT)
Dept: NEUROSURGERY | Facility: CLINIC | Age: 65
End: 2025-05-12
Payer: MEDICARE

## 2025-05-19 ENCOUNTER — TELEPHONE (OUTPATIENT)
Dept: NEUROSURGERY | Facility: CLINIC | Age: 65
End: 2025-05-19
Payer: MEDICARE

## 2025-05-19 NOTE — TELEPHONE ENCOUNTER
Pt was advised nothing surgical at the moment she should proceed with more injections. Per the NP Lucila. Pt verbalized understanding.

## 2025-05-23 ENCOUNTER — PATIENT MESSAGE (OUTPATIENT)
Dept: PAIN MEDICINE | Facility: CLINIC | Age: 65
End: 2025-05-23
Payer: MEDICARE

## 2025-05-29 ENCOUNTER — OFFICE VISIT (OUTPATIENT)
Dept: PAIN MEDICINE | Facility: CLINIC | Age: 65
End: 2025-05-29
Payer: MEDICARE

## 2025-05-29 ENCOUNTER — TELEPHONE (OUTPATIENT)
Dept: PAIN MEDICINE | Facility: CLINIC | Age: 65
End: 2025-05-29
Payer: MEDICARE

## 2025-05-29 VITALS
SYSTOLIC BLOOD PRESSURE: 134 MMHG | WEIGHT: 175.94 LBS | HEIGHT: 61 IN | HEART RATE: 80 BPM | DIASTOLIC BLOOD PRESSURE: 86 MMHG | BODY MASS INDEX: 33.22 KG/M2

## 2025-05-29 DIAGNOSIS — M54.16 LUMBAR RADICULOPATHY: Primary | ICD-10-CM

## 2025-05-29 DIAGNOSIS — M51.369 DEGENERATION OF INTERVERTEBRAL DISC OF LUMBAR REGION, UNSPECIFIED WHETHER PAIN PRESENT: ICD-10-CM

## 2025-05-29 DIAGNOSIS — M47.896 OTHER SPONDYLOSIS, LUMBAR REGION: ICD-10-CM

## 2025-05-29 PROCEDURE — 99214 OFFICE O/P EST MOD 30 MIN: CPT | Mod: PBBFAC,PN | Performed by: PHYSICIAN ASSISTANT

## 2025-05-29 PROCEDURE — 99214 OFFICE O/P EST MOD 30 MIN: CPT | Mod: S$PBB,,, | Performed by: PHYSICIAN ASSISTANT

## 2025-05-29 PROCEDURE — 99999 PR PBB SHADOW E&M-EST. PATIENT-LVL IV: CPT | Mod: PBBFAC,,, | Performed by: PHYSICIAN ASSISTANT

## 2025-05-29 NOTE — TELEPHONE ENCOUNTER
Types of orders made on 05/29/2025: Procedure Request      Order Date:5/29/2025   Ordering User:STONEY LAZAR [[id number]]   Encounter Provider:Stoney Lazar PA-C [[id number]]   Authorizing Provider: Stoney Lazar PA-C [[id number]]   Supervising Provider:HAN SCOTT [[id number]]   Type of Supervision:Supervision Required   Department:[facility] PAIN MANAGEMENT[[id number]]      Common Order Information   Procedure -> Epidural Injection (specify level) Cmt: L3-4      Pre-op Diagnosis -> Lumbar radiculitis      Order Specific Information   Order: Procedure Order to Pain Management [Custom: [id number]]  Order #:          [id number]: 1 FUTURE     Priority: Routine  Class: Clinic Performed     Future Order Information       Expires on:05/29/2026            Expected by:05/29/2025                   Associated Diagnoses       M54.16 Lumbar radiculopathy       Physician -> han scott          Is patient on anti-coagulants? -> No          Facility Name: -> [facility]          Follow-up: -> 4 weeks              Priority: Routine  Class: Clinic Performed     Future Order Information       Expires on:05/29/2026            Expected by:05/29/2025                   Associated Diagnoses       M54.16 Lumbar radiculopathy       Procedure -> Epidural Injection (specify level) Cmt: L3-4          Physician -> han scott          Is patient on anti-coagulants? -> No          Pre-op Diagnosis -> Lumbar radiculitis

## 2025-05-29 NOTE — PROGRESS NOTES
Referring Physician: No ref. provider found    PCP: No, Primary Doctor      CC: lower back pain    Interval History:  Carleen Kincaid is a 64 y.o. female with chronic low back pain who presents today for evaluation. Reports worsening aching throbbing pain in low back with radiation down b/l anterior thighs. Reports she feels heaviness in legs with walking> Symptoms improve with rest. Previous lumbar EVELIO at L3-4 provided >80% relief x 4 months. She discontinued Gabapentin 300 mg because she thought it was worsening symptoms. She denies any worsening weakness or b/b changes. Pain today is rated 6/10.      HPI:   Carleen Kincaid is a 64 y.o. female referred to us for lower back pain.  Patient with history of L4-5 fusion in 2014.  She states having continued lower back pain for over 5 years.  Pain is a constant aching, throbbing pain in his lower back.  Pain occasionally radiates down her left anterior thigh.  She has tingling in her whole posterior thighs as well.  Pain worsens with sitting, standing, lying, walking getting up.  Pain improves with rest.  She recently seen by Neurosurgery.  She had MRIs of the cervical, thoracic and lumbar spine.  She does have some ongoing midback pain as well as neck pain.  Lower back pain is more bothersome.  She has tried physical therapy with minimal benefit.  She underwent a lumbar epidural steroid injection in 2022 which provided over 50% benefit for over a year.  He desires repeat procedure with us.  She denies any worsening weakness.  No bowel bladder changes.    ROS:  CONSTITUTIONAL: No fevers, chills, night sweats, wt. loss, appetite changes  SKIN: no rashes or itching  ENT: No headaches, head trauma, vision changes, or eye pain  LYMPH NODES: None noticed   CV: No chest pain, palpitations.   RESP: No shortness of breath, dyspnea on exertion, cough, wheezing, or hemoptysis  GI: No nausea, emesis, diarrhea, constipation, melena, hematochezia, pain.    : No dysuria,  hematuria, urgency, or frequency   HEME: No easy bruising, bleeding problems  PSYCHIATRIC: No depression, anxiety, psychosis, hallucinations.  NEURO: No seizures, memory loss, dizziness or difficulty sleeping  MSK: +HPI      Past Medical History:   Diagnosis Date    Anxiety     Degenerative disc disease, cervical     Depression     General anesthetics causing adverse effect in therapeutic use     NO DEMEROL    Hyperlipidemia     Hypertension     Internal derangement of right knee 2/13/2017    Joint pain 11/12/2014    Kidney stones     Knee pain 2/13/2017    Psoriasis 1983    Right knee pain 11/16/2015    Stroke     retinal stroke     Past Surgical History:   Procedure Laterality Date    APPENDECTOMY      EPIDURAL STEROID INJECTION N/A 7/13/2022    Procedure: Injection, Steroid, Epidural;  Surgeon: Shaka Caldwell MD;  Location: [facility] [place] [facility];  Service: Pain Management;  Laterality: N/A;  L3-4 (dr Gonzalez)     EPIDURAL STEROID INJECTION INTO LUMBAR SPINE N/A 1/9/2025    Procedure: Injection-steroid-epidural-lumbar;  Surgeon: Shaka Caldwell MD;  Location[place] [facility] [place];  Service: Pain Management;  Laterality: N/A;    HYSTERECTOMY      partial    JOINT REPLACEMENT Bilateral     Knees    KNEE ARTHROSCOPY Right 8/27/2015    Dr Roberts    lithtotrispy      LUMBAR SPINE SURGERY  10/2014    L4-L5 fusion    REVISION OF KNEE ARTHROPLASTY Left 4/5/2022    Procedure: REVISION, ARTHROPLASTY, KNEE;  Surgeon: Sri Roberts MD;  Location: [facility] OR;  Service: Orthopedics;  Laterality: Left;  general, regional w catheter, adductor, epidural, spinal, ester 50cc     Family History   Problem Relation Name Age of Onset    Thyroid disease Mother      Cancer Mother  55        lymphoma    Paget's disease of bone Mother      Psoriasis Father      Cancer Father  82        Bladder cancer    Diabetes Mellitus Brother      Heart disease Brother      Cancer Brother          prostate    Melanoma Brother      Bipolar disorder Daughter      Kidney disease Daughter    "       reflux    Psoriasis Maternal Grandmother      Diabetes Mellitus Brother      Heart disease Brother      Cancer Brother          prostate    Melanoma Brother      Diabetes Mellitus Brother      Heart disease Brother      Cancer Brother          prostate    Melanoma Brother      Lupus Neg Hx      Inflammatory bowel disease Neg Hx      Rheum arthritis Neg Hx       Social History     Socioeconomic History    Marital status:      Spouse name: Jose    Number of children: 3   Tobacco Use    Smoking status: Never    Smokeless tobacco: Never    Tobacco comments:     ; ; 3 children   Substance and Sexual Activity    Alcohol use: Yes     Comment: rarely    Drug use: No   Social History Narrative    Stairs-      Social Drivers of Health     Financial Resource Strain: Low Risk  (4/4/2024)    Overall Financial Resource Strain (CARDIA)     Difficulty of Paying Living Expenses: Not very hard   Food Insecurity: No Food Insecurity (4/4/2024)    Hunger Vital Sign     Worried About Running Out of Food in the Last Year: Never true     Ran Out of Food in the Last Year: Never true   Transportation Needs: No Transportation Needs (4/4/2024)    PRAPARE - Transportation     Lack of Transportation (Medical): No     Lack of Transportation (Non-Medical): No   Physical Activity: Unknown (4/4/2024)    Exercise Vital Sign     Days of Exercise per Week: 3 days   Stress: Stress Concern Present (4/4/2024)    [employer] - Occupational Stress Questionnaire     Feeling of Stress : To some extent   Housing Stability: Low Risk  (4/4/2024)    Housing Stability Vital Sign     Unable to Pay for Housing in the Last Year: No     Number of Places Lived in the Last Year: 1     Unstable Housing in the Last Year: No         Medications/Allergies: See med card    Vitals:    05/29/25 1042   BP: 134/86   Pulse: 80   Weight: 79.8 kg (175 lb 14.8 oz)   Height: 5' 1" (1.549 m)   PainSc:   6 "   PainLoc: Back         Physical exam:    GENERAL: A and O x3, the patient appears well groomed and is in no acute distress.  Skin: No rashes or obvious lesions  HEENT: normocephalic, atraumatic  CARDIOVASCULAR:  RRR  LUNGS: non labored breathing  ABDOMEN: soft, nontender   UPPER EXTREMITIES: Normal alignment, normal range of motion, no atrophy, no skin changes,  hair growth and nail growth normal and equal bilaterally. No swelling, no tenderness.    LOWER EXTREMITIES:  Normal alignment, normal range of motion, no atrophy, no skin changes,  hair growth and nail growth normal and equal bilaterally. No swelling, no tenderness.  CERVICAL SPINE:  Cervical spine: ROM is limited in flexion, extension and lateral rotation with moderate increased pain.  Spurling's maneuver causes no neck pain to either side.  Myofascial exam: No Tenderness to palpation across cervical paraspinous region bilaterally.    LUMBAR SPINE  Lumbar spine: ROM is limited with flexion extension and oblique extension with moderate increased pain.    Dion's test causes no increased pain on either side.    Supine straight leg raise is negative bilaterally.  +femoral stretch  Internal and external rotation of the hip causes no increased pain on either side.  Myofascial exam: No tenderness to palpation across lumbar paraspinous muscles.      MENTAL STATUS: normal orientation, speech, language, and fund of knowledge for social situation.  Emotional state appropriate.    CRANIAL NERVES:  II:  PERRL bilaterally,   III,IV,VI: EOMI.    V:  Facial sensation equal bilaterally  VII:  Facial motor function normal.  VIII:  Hearing equal to finger rub bilaterally  IX/X: Gag normal, palate symmetric  XI:  Shoulder shrug equal, head turn equal  XII:  Tongue midline without fasciculations      MOTOR: Tone and bulk: normal bilateral upper and lower Strength: normal   SENSATION: Light touch and pinprick intact bilaterally  REFLEXES: normal, symmetric, nonbrisk.  Toes  down, no clonus. No hoffmans.  GAIT: normal rise, base, steps, and arm swing.        Imaging:  MRI C-spine 11/2024  C3-C4: Anterolisthesis with uncovering the disc.  Posterior disc osteophyte complex, asymmetric to the left.  Severe left and mild right facet arthropathy.  Marked left uncovertebral joint spurring.  Severe left neural foraminal stenosis.  Ligamentum flavum thickening.  Mild spinal canal stenosis.     C4-C5: Anterolisthesis with uncovering of the disc.  Posterior disc osteophyte complex.  Moderate right and mild left facet arthropathy.  Moderate right and mild left uncovertebral joint spurring.  Moderate right and mild left neural foraminal stenosis.  Ligamentum flavum thickening.  Mild spinal canal stenosis.     C5-C6: Anterolisthesis with uncovering the disc.  Posterior disc osteophyte complex.  Moderate left and mild right neural facet arthropathy.  Moderate bilateral uncovertebral joint spurring.  Moderate right and mild left neural foraminal stenosis.  Ligamentum flavum thickening.  Mild spinal canal stenosis.     C6-C7: Posterior disc osteophyte complex.  Mild bilateral facet arthropathy.  Moderate left and mild right uncovertebral joint spurring.  Mild-to-moderate left neural foraminal stenosis.  No significant spinal canal stenosis.     C7-T1: Mild posterior disc osteophyte complex.  Mild bilateral facet arthropathy.  Mild bilateral uncovertebral joint spurring.  Mild left greater than right neural foraminal stenosis.  No significant spinal canal stenosis.    MRI T-spine 11/2024  Impression:     1. Multilevel moderate to severe degenerative changes of the thoracic spine, as detailed above, most pronounced at T11-12 and contributing to moderate spinal canal and mild neural foraminal stenosis.  2. Moderate neural foraminal and mild spinal canal stenosis at T1-2.  Mild neural foraminal and spinal canal stenosis elsewhere in the thoracic spine.      MRI L-spine 11/2024  Vertebral column:  Postsurgical changes of posterior lumbar interbody fusion at L4-5 with associated susceptibility artifact.  Decompressive laminectomy at this level.  No acute fracture is identified; however, if trauma is suspected, a CT scan would be a more sensitive examination for fractures. No evidence of an aggressive marrow replacement process.  Severe intervertebral disc space narrowing asymmetrically on the right with vacuum disc phenomenon, and Modic type 2 degenerative endplate changes with Schmorl's nodes and marginal osteophyte formation.     Cord: Normal.  Conus terminates at L2.     Degenerative findings:     T12-L1: Mild circumferential disc bulge with small central annular fissure.  Mild bilateral facet arthropathy and ligamentum flavum thickening.  No significant neural foraminal or spinal canal stenosis.     L1-L2: Left foraminal/extraforaminal disc protrusion.  Mild bilateral facet arthropathy and ligamentum flavum thickening.  Mild left neural foraminal stenosis.  No spinal canal stenosis.     L2-L3: Mild circumferential disc bulge, asymmetric to the left.  Mild bilateral facet arthropathy.  No significant neural foraminal or spinal canal stenosis.     L3-L4: Severe right asymmetric disc space narrowing.  Retrolisthesis.  Circumferential disc bulge with osteophytic ridging, asymmetric to the right.  Moderate bilateral facet arthropathy.  Severe right and mild left neural foraminal stenosis.  Mild spinal canal stenosis.  Mild-to-moderate right lateral recess stenosis.     L4-L5: Postoperative changes, as above.   No significant neural foraminal or spinal canal stenosis.     L5-S1: Mild to moderate left asymmetric disc space narrowing.  Circumferential disc bulge, asymmetric to the left.  Severe bilateral facet arthropathy with bilateral facet effusions.  Ligamentum flavum thickening.  Moderate left and mild right neural foraminal stenosis.  No significant spinal canal stenosis.    Assessment:  Carleen Kincaid is  a 64 y.o. female with lower back pain.  1. Lumbar radiculopathy    2. Degeneration of intervertebral disc of lumbar region, unspecified whether pain present    3. Other spondylosis, lumbar region          Plan:  I have stressed the importance of physical activity and exercise to improve overall health  Reviewed pertinent imaging and records with patient  Schedule repeat lumbar epidural steroid injection to the L3-4 level(s). I have explained the risks, benefits, and alternatives of the procedure in detail. The patient voices understanding and all questions have been answered. The patient agrees to proceed as planned. Written Consent obtained.   May consider thoracic EVELIO in the future  F/u s/p EVELIO

## 2025-05-29 NOTE — H&P (VIEW-ONLY)
Referring Physician: No ref. provider found    PCP: No, Primary Doctor      CC: lower back pain    Interval History:  Carleen Kincaid is a 64 y.o. female with chronic low back pain who presents today for evaluation. Reports worsening aching throbbing pain in low back with radiation down b/l anterior thighs. Reports she feels heaviness in legs with walking> Symptoms improve with rest. Previous lumbar EVELIO at L3-4 provided >80% relief x 4 months. She discontinued Gabapentin 300 mg because she thought it was worsening symptoms. She denies any worsening weakness or b/b changes. Pain today is rated 6/10.      HPI:   Carleen Kincaid is a 64 y.o. female referred to us for lower back pain.  Patient with history of L4-5 fusion in 2014.  She states having continued lower back pain for over 5 years.  Pain is a constant aching, throbbing pain in his lower back.  Pain occasionally radiates down her left anterior thigh.  She has tingling in her whole posterior thighs as well.  Pain worsens with sitting, standing, lying, walking getting up.  Pain improves with rest.  She recently seen by Neurosurgery.  She had MRIs of the cervical, thoracic and lumbar spine.  She does have some ongoing midback pain as well as neck pain.  Lower back pain is more bothersome.  She has tried physical therapy with minimal benefit.  She underwent a lumbar epidural steroid injection in 2022 which provided over 50% benefit for over a year.  He desires repeat procedure with us.  She denies any worsening weakness.  No bowel bladder changes.    ROS:  CONSTITUTIONAL: No fevers, chills, night sweats, wt. loss, appetite changes  SKIN: no rashes or itching  ENT: No headaches, head trauma, vision changes, or eye pain  LYMPH NODES: None noticed   CV: No chest pain, palpitations.   RESP: No shortness of breath, dyspnea on exertion, cough, wheezing, or hemoptysis  GI: No nausea, emesis, diarrhea, constipation, melena, hematochezia, pain.    : No dysuria,  hematuria, urgency, or frequency   HEME: No easy bruising, bleeding problems  PSYCHIATRIC: No depression, anxiety, psychosis, hallucinations.  NEURO: No seizures, memory loss, dizziness or difficulty sleeping  MSK: +HPI      Past Medical History:   Diagnosis Date    Anxiety     Degenerative disc disease, cervical     Depression     General anesthetics causing adverse effect in therapeutic use     NO DEMEROL    Hyperlipidemia     Hypertension     Internal derangement of right knee 2/13/2017    Joint pain 11/12/2014    Kidney stones     Knee pain 2/13/2017    Psoriasis 1983    Right knee pain 11/16/2015    Stroke     retinal stroke     Past Surgical History:   Procedure Laterality Date    APPENDECTOMY      EPIDURAL STEROID INJECTION N/A 7/13/2022    Procedure: Injection, Steroid, Epidural;  Surgeon: Shaka Caldwell MD;  Location: [facility] OR;  Service: Pain Management;  Laterality: N/A;  L3-4 (dr Gonzalez)     EPIDURAL STEROID INJECTION INTO LUMBAR SPINE N/A 1/9/2025    Procedure: Injection-steroid-epidural-lumbar;  Surgeon: Shaka Caldwell MD;  Location: [facility] OR;  Service: Pain Management;  Laterality: N/A;    HYSTERECTOMY      partial    JOINT REPLACEMENT Bilateral     Knees    KNEE ARTHROSCOPY Right 8/27/2015    Dr Roberts    lithtotrispy      LUMBAR SPINE SURGERY  10/2014    L4-L5 fusion    REVISION OF KNEE ARTHROPLASTY Left 4/5/2022    Procedure: REVISION, ARTHROPLASTY, KNEE;  Surgeon: Sri Roberts MD;  Location: [facility] OR;  Service: Orthopedics;  Laterality: Left;  general, regional w catheter, adductor, epidural, spinal, ester 50cc     Family History   Problem Relation Name Age of Onset    Thyroid disease Mother      Cancer Mother  55        lymphoma    Paget's disease of bone Mother      Psoriasis Father      Cancer Father  82        Bladder cancer    Diabetes Mellitus Brother      Heart disease Brother      Cancer Brother          prostate    Melanoma Brother      Bipolar disorder Daughter      Kidney disease Daughter    "       reflux    Psoriasis Maternal Grandmother      Diabetes Mellitus Brother      Heart disease Brother      Cancer Brother          prostate    Melanoma Brother      Diabetes Mellitus Brother      Heart disease Brother      Cancer Brother          prostate    Melanoma Brother      Lupus Neg Hx      Inflammatory bowel disease Neg Hx      Rheum arthritis Neg Hx       Social History     Socioeconomic History    Marital status:      Spouse name: Jose    Number of children: 3   Tobacco Use    Smoking status: Never    Smokeless tobacco: Never    Tobacco comments:     ; ; 3 children   Substance and Sexual Activity    Alcohol use: Yes     Comment: rarely    Drug use: No   Social History Narrative    Stairs-      Social Drivers of Health     Financial Resource Strain: Low Risk  (4/4/2024)    Overall Financial Resource Strain (CARDIA)     Difficulty of Paying Living Expenses: Not very hard   Food Insecurity: No Food Insecurity (4/4/2024)    Hunger Vital Sign     Worried About Running Out of Food in the Last Year: Never true     Ran Out of Food in the Last Year: Never true   Transportation Needs: No Transportation Needs (4/4/2024)    PRAPARE - Transportation     Lack of Transportation (Medical): No     Lack of Transportation (Non-Medical): No   Physical Activity: Unknown (4/4/2024)    Exercise Vital Sign     Days of Exercise per Week: 3 days   Stress: Stress Concern Present (4/4/2024)    [employer] - Occupational Stress Questionnaire     Feeling of Stress : To some extent   Housing Stability: Low Risk  (4/4/2024)    Housing Stability Vital Sign     Unable to Pay for Housing in the Last Year: No     Number of Places Lived in the Last Year: 1     Unstable Housing in the Last Year: No         Medications/Allergies: See med card    Vitals:    05/29/25 1042   BP: 134/86   Pulse: 80   Weight: 79.8 kg (175 lb 14.8 oz)   Height: 5' 1" (1.549 m)   PainSc:   6 "   PainLoc: Back         Physical exam:    GENERAL: A and O x3, the patient appears well groomed and is in no acute distress.  Skin: No rashes or obvious lesions  HEENT: normocephalic, atraumatic  CARDIOVASCULAR:  RRR  LUNGS: non labored breathing  ABDOMEN: soft, nontender   UPPER EXTREMITIES: Normal alignment, normal range of motion, no atrophy, no skin changes,  hair growth and nail growth normal and equal bilaterally. No swelling, no tenderness.    LOWER EXTREMITIES:  Normal alignment, normal range of motion, no atrophy, no skin changes,  hair growth and nail growth normal and equal bilaterally. No swelling, no tenderness.  CERVICAL SPINE:  Cervical spine: ROM is limited in flexion, extension and lateral rotation with moderate increased pain.  Spurling's maneuver causes no neck pain to either side.  Myofascial exam: No Tenderness to palpation across cervical paraspinous region bilaterally.    LUMBAR SPINE  Lumbar spine: ROM is limited with flexion extension and oblique extension with moderate increased pain.    Dion's test causes no increased pain on either side.    Supine straight leg raise is negative bilaterally.  +femoral stretch  Internal and external rotation of the hip causes no increased pain on either side.  Myofascial exam: No tenderness to palpation across lumbar paraspinous muscles.      MENTAL STATUS: normal orientation, speech, language, and fund of knowledge for social situation.  Emotional state appropriate.    CRANIAL NERVES:  II:  PERRL bilaterally,   III,IV,VI: EOMI.    V:  Facial sensation equal bilaterally  VII:  Facial motor function normal.  VIII:  Hearing equal to finger rub bilaterally  IX/X: Gag normal, palate symmetric  XI:  Shoulder shrug equal, head turn equal  XII:  Tongue midline without fasciculations      MOTOR: Tone and bulk: normal bilateral upper and lower Strength: normal   SENSATION: Light touch and pinprick intact bilaterally  REFLEXES: normal, symmetric, nonbrisk.  Toes  down, no clonus. No hoffmans.  GAIT: normal rise, base, steps, and arm swing.        Imaging:  MRI C-spine 11/2024  C3-C4: Anterolisthesis with uncovering the disc.  Posterior disc osteophyte complex, asymmetric to the left.  Severe left and mild right facet arthropathy.  Marked left uncovertebral joint spurring.  Severe left neural foraminal stenosis.  Ligamentum flavum thickening.  Mild spinal canal stenosis.     C4-C5: Anterolisthesis with uncovering of the disc.  Posterior disc osteophyte complex.  Moderate right and mild left facet arthropathy.  Moderate right and mild left uncovertebral joint spurring.  Moderate right and mild left neural foraminal stenosis.  Ligamentum flavum thickening.  Mild spinal canal stenosis.     C5-C6: Anterolisthesis with uncovering the disc.  Posterior disc osteophyte complex.  Moderate left and mild right neural facet arthropathy.  Moderate bilateral uncovertebral joint spurring.  Moderate right and mild left neural foraminal stenosis.  Ligamentum flavum thickening.  Mild spinal canal stenosis.     C6-C7: Posterior disc osteophyte complex.  Mild bilateral facet arthropathy.  Moderate left and mild right uncovertebral joint spurring.  Mild-to-moderate left neural foraminal stenosis.  No significant spinal canal stenosis.     C7-T1: Mild posterior disc osteophyte complex.  Mild bilateral facet arthropathy.  Mild bilateral uncovertebral joint spurring.  Mild left greater than right neural foraminal stenosis.  No significant spinal canal stenosis.    MRI T-spine 11/2024  Impression:     1. Multilevel moderate to severe degenerative changes of the thoracic spine, as detailed above, most pronounced at T11-12 and contributing to moderate spinal canal and mild neural foraminal stenosis.  2. Moderate neural foraminal and mild spinal canal stenosis at T1-2.  Mild neural foraminal and spinal canal stenosis elsewhere in the thoracic spine.      MRI L-spine 11/2024  Vertebral column:  Postsurgical changes of posterior lumbar interbody fusion at L4-5 with associated susceptibility artifact.  Decompressive laminectomy at this level.  No acute fracture is identified; however, if trauma is suspected, a CT scan would be a more sensitive examination for fractures. No evidence of an aggressive marrow replacement process.  Severe intervertebral disc space narrowing asymmetrically on the right with vacuum disc phenomenon, and Modic type 2 degenerative endplate changes with Schmorl's nodes and marginal osteophyte formation.     Cord: Normal.  Conus terminates at L2.     Degenerative findings:     T12-L1: Mild circumferential disc bulge with small central annular fissure.  Mild bilateral facet arthropathy and ligamentum flavum thickening.  No significant neural foraminal or spinal canal stenosis.     L1-L2: Left foraminal/extraforaminal disc protrusion.  Mild bilateral facet arthropathy and ligamentum flavum thickening.  Mild left neural foraminal stenosis.  No spinal canal stenosis.     L2-L3: Mild circumferential disc bulge, asymmetric to the left.  Mild bilateral facet arthropathy.  No significant neural foraminal or spinal canal stenosis.     L3-L4: Severe right asymmetric disc space narrowing.  Retrolisthesis.  Circumferential disc bulge with osteophytic ridging, asymmetric to the right.  Moderate bilateral facet arthropathy.  Severe right and mild left neural foraminal stenosis.  Mild spinal canal stenosis.  Mild-to-moderate right lateral recess stenosis.     L4-L5: Postoperative changes, as above.   No significant neural foraminal or spinal canal stenosis.     L5-S1: Mild to moderate left asymmetric disc space narrowing.  Circumferential disc bulge, asymmetric to the left.  Severe bilateral facet arthropathy with bilateral facet effusions.  Ligamentum flavum thickening.  Moderate left and mild right neural foraminal stenosis.  No significant spinal canal stenosis.    Assessment:  Carleen Kincaid is  a 64 y.o. female with lower back pain.  1. Lumbar radiculopathy    2. Degeneration of intervertebral disc of lumbar region, unspecified whether pain present    3. Other spondylosis, lumbar region          Plan:  I have stressed the importance of physical activity and exercise to improve overall health  Reviewed pertinent imaging and records with patient  Schedule repeat lumbar epidural steroid injection to the L3-4 level(s). I have explained the risks, benefits, and alternatives of the procedure in detail. The patient voices understanding and all questions have been answered. The patient agrees to proceed as planned. Written Consent obtained.   May consider thoracic EVELIO in the future  F/u s/p EVELIO

## 2025-06-12 ENCOUNTER — HOSPITAL ENCOUNTER (OUTPATIENT)
Facility: HOSPITAL | Age: 65
Discharge: HOME | End: 2025-06-12
Attending: ANESTHESIOLOGY | Admitting: ANESTHESIOLOGY
Payer: MEDICARE

## 2025-06-12 DIAGNOSIS — M54.16 LUMBAR RADICULITIS: ICD-10-CM

## 2025-06-12 PROCEDURE — 62323 NJX INTERLAMINAR LMBR/SAC: CPT | Performed by: ANESTHESIOLOGY

## 2025-06-12 PROCEDURE — 62323 NJX INTERLAMINAR LMBR/SAC: CPT | Mod: ,,, | Performed by: ANESTHESIOLOGY

## 2025-06-12 PROCEDURE — A4216 STERILE WATER/SALINE, 10 ML: HCPCS | Performed by: ANESTHESIOLOGY

## 2025-06-12 PROCEDURE — 25000003 PHARM REV CODE 250: Performed by: ANESTHESIOLOGY

## 2025-06-12 PROCEDURE — 63600175 PHARM REV CODE 636 W HCPCS: Performed by: ANESTHESIOLOGY

## 2025-06-12 PROCEDURE — 25500020 PHARM REV CODE 255: Performed by: ANESTHESIOLOGY

## 2025-06-12 RX ORDER — SODIUM CHLORIDE, SODIUM LACTATE, POTASSIUM CHLORIDE, CALCIUM CHLORIDE 600; 310; 30; 20 MG/100ML; MG/100ML; MG/100ML; MG/100ML
INJECTION, SOLUTION INTRAVENOUS CONTINUOUS
Status: DISCONTINUED | OUTPATIENT
Start: 2025-06-12 | End: 2025-06-12 | Stop reason: HOSPADM

## 2025-06-12 RX ORDER — SODIUM CHLORIDE 9 MG/ML
INJECTION, SOLUTION INTRAMUSCULAR; INTRAVENOUS; SUBCUTANEOUS
Status: DISCONTINUED | OUTPATIENT
Start: 2025-06-12 | End: 2025-06-12 | Stop reason: HOSPADM

## 2025-06-12 RX ORDER — LIDOCAINE HYDROCHLORIDE 10 MG/ML
1 INJECTION, SOLUTION EPIDURAL; INFILTRATION; INTRACAUDAL; PERINEURAL ONCE
Status: DISCONTINUED | OUTPATIENT
Start: 2025-06-12 | End: 2025-06-12 | Stop reason: HOSPADM

## 2025-06-12 RX ORDER — LIDOCAINE HYDROCHLORIDE 10 MG/ML
INJECTION, SOLUTION EPIDURAL; INFILTRATION; INTRACAUDAL; PERINEURAL
Status: DISCONTINUED | OUTPATIENT
Start: 2025-06-12 | End: 2025-06-12 | Stop reason: HOSPADM

## 2025-06-12 RX ORDER — DEXAMETHASONE SODIUM PHOSPHATE 10 MG/ML
INJECTION, SOLUTION INTRA-ARTICULAR; INTRALESIONAL; INTRAMUSCULAR; INTRAVENOUS; SOFT TISSUE
Status: DISCONTINUED | OUTPATIENT
Start: 2025-06-12 | End: 2025-06-12 | Stop reason: HOSPADM

## 2025-06-12 NOTE — OP NOTE
PROCEDURE DATE: 6/12/2025    Procedure:   Interlaminar epidural steroid injection at L3-4 under fluoroscopic guidance.    Diagnosis: lUMBAR radiculitis  pOSTOP DIAGNOSIS: sAME    Physician: Shaka Caldwell M.D.    Medications injected:10 mg dexamethasone with 4 ml of preservative free NaCl    Local anesthetic injected:    Lidocaine 1% 2 ml total    Sedation Medications: RN IV sedation    Estimated blood loss:  None    Complications:  None    Technique:  Time-out taken to identify patient and procedure prior to starting the procedure.  With the patient laying in a prone position, the area was prepped and draped in the usual sterile fashion using ChloraPrep and a fenestrated drape.  After determining the target level with an AP fluoroscopic view, local anesthetic was given using a 25-gauge 1.5 inch needle by raising a wheal and then infiltrating toward the interlaminar entry space.  A 3.5inch 20-gauge Touhy needle was introduced under AP fluoroscopic guidance to the interlaminar space of L3-4. Once the trajectory was established, the needle was visualized in the lateral view and advanced using loss of resistance technique. Once in the desired position, 1ml contrast was injected to confirm placement and there was no vascular uptake nor intrathecal spread.  The medication was then injected slowly. The patient tolerated the procedure well.      The patient was monitored after the procedure.   They were given post-procedure and discharge instructions to follow at home.  The patient was discharged in a stable condition.

## 2025-06-12 NOTE — PLAN OF CARE
Pre-op complete.  will return for . Text notifications initiated. Pt denies need for safe. Belongings with  except shirt is under stretcher.

## 2025-06-12 NOTE — DISCHARGE SUMMARY
[facility] - Periop Services  Discharge Note  Short Stay    Procedure(s) (LRB):  Injection-steroid-epidural-lumbar l3-4 (N/A)      OUTCOME: Patient tolerated treatment/procedure well without complication and is now ready for discharge.    DISPOSITION: Home or Self Care    FINAL DIAGNOSIS:  <principal problem not specified>    FOLLOWUP: In clinic    DISCHARGE INSTRUCTIONS:    Discharge Procedure Orders   Notify your health care provider if you experience any of the following:  temperature >100.4     Notify your health care provider if you experience any of the following:  severe uncontrolled pain     Notify your health care provider if you experience any of the following:  redness, tenderness, or signs of infection (pain, swelling, redness, odor or green/yellow discharge around incision site)     Activity as tolerated        TIME SPENT ON DISCHARGE: 30 minutes

## 2025-06-13 VITALS
TEMPERATURE: 98 F | DIASTOLIC BLOOD PRESSURE: 70 MMHG | WEIGHT: 175 LBS | HEART RATE: 71 BPM | SYSTOLIC BLOOD PRESSURE: 115 MMHG | RESPIRATION RATE: 18 BRPM | OXYGEN SATURATION: 98 % | HEIGHT: 61 IN | BODY MASS INDEX: 33.04 KG/M2

## 2025-06-18 ENCOUNTER — PATIENT MESSAGE (OUTPATIENT)
Dept: PAIN MEDICINE | Facility: CLINIC | Age: 65
End: 2025-06-18
Payer: MEDICARE

## 2025-07-10 ENCOUNTER — OFFICE VISIT (OUTPATIENT)
Dept: PAIN MEDICINE | Facility: CLINIC | Age: 65
End: 2025-07-10
Payer: MEDICARE

## 2025-07-10 VITALS
HEART RATE: 94 BPM | WEIGHT: 175.06 LBS | HEIGHT: 61 IN | DIASTOLIC BLOOD PRESSURE: 92 MMHG | SYSTOLIC BLOOD PRESSURE: 136 MMHG | BODY MASS INDEX: 33.05 KG/M2

## 2025-07-10 DIAGNOSIS — M50.30 DDD (DEGENERATIVE DISC DISEASE), CERVICAL: ICD-10-CM

## 2025-07-10 DIAGNOSIS — M51.34 DDD (DEGENERATIVE DISC DISEASE), THORACIC: ICD-10-CM

## 2025-07-10 DIAGNOSIS — M70.62 GREATER TROCHANTERIC BURSITIS OF LEFT HIP: ICD-10-CM

## 2025-07-10 DIAGNOSIS — M54.16 LUMBAR RADICULOPATHY: Primary | ICD-10-CM

## 2025-07-10 DIAGNOSIS — M51.369 DEGENERATION OF INTERVERTEBRAL DISC OF LUMBAR REGION, UNSPECIFIED WHETHER PAIN PRESENT: ICD-10-CM

## 2025-07-10 DIAGNOSIS — M47.896 OTHER SPONDYLOSIS, LUMBAR REGION: ICD-10-CM

## 2025-07-10 PROCEDURE — 99999 PR PBB SHADOW E&M-EST. PATIENT-LVL III: CPT | Mod: PBBFAC,,, | Performed by: PHYSICIAN ASSISTANT

## 2025-07-10 PROCEDURE — 99213 OFFICE O/P EST LOW 20 MIN: CPT | Mod: PBBFAC,PN | Performed by: PHYSICIAN ASSISTANT

## 2025-07-10 PROCEDURE — 99213 OFFICE O/P EST LOW 20 MIN: CPT | Mod: S$PBB,,, | Performed by: PHYSICIAN ASSISTANT

## 2025-07-10 NOTE — PROGRESS NOTES
Referring Physician: No ref. provider found    PCP: No, Primary Doctor      CC: lower back pain    Interval History:  Carleen Kincaid is a 64 y.o. female with chronic low back pain who presents today for f/u s/p L3-4 IESI. Reports >70% improvement. She has chronic low back with radiation down b/l anterior thighs. Reports she feels heaviness in legs with walking.  Symptoms improve with rest. Previous lumbar EVELIO at L3-4 provided >80% relief x 4 months. She discontinued Gabapentin 300 mg because she thought it was worsening symptoms. She denies any worsening weakness or b/b changes. Pain today is rated 2/10.      HPI:   Carleen Kincaid is a 64 y.o. female referred to us for lower back pain.  Patient with history of L4-5 fusion in 2014.  She states having continued lower back pain for over 5 years.  Pain is a constant aching, throbbing pain in his lower back.  Pain occasionally radiates down her left anterior thigh.  She has tingling in her whole posterior thighs as well.  Pain worsens with sitting, standing, lying, walking getting up.  Pain improves with rest.  She recently seen by Neurosurgery.  She had MRIs of the cervical, thoracic and lumbar spine.  She does have some ongoing midback pain as well as neck pain.  Lower back pain is more bothersome.  She has tried physical therapy with minimal benefit.  She underwent a lumbar epidural steroid injection in 2022 which provided over 50% benefit for over a year.  He desires repeat procedure with us.  She denies any worsening weakness.  No bowel bladder changes.    ROS:  CONSTITUTIONAL: No fevers, chills, night sweats, wt. loss, appetite changes  SKIN: no rashes or itching  ENT: No headaches, head trauma, vision changes, or eye pain  LYMPH NODES: None noticed   CV: No chest pain, palpitations.   RESP: No shortness of breath, dyspnea on exertion, cough, wheezing, or hemoptysis  GI: No nausea, emesis, diarrhea, constipation, melena, hematochezia, pain.    : No dysuria,  hematuria, urgency, or frequency   HEME: No easy bruising, bleeding problems  PSYCHIATRIC: No depression, anxiety, psychosis, hallucinations.  NEURO: No seizures, memory loss, dizziness or difficulty sleeping  MSK: +HPI      Past Medical History:   Diagnosis Date    Anxiety     Degenerative disc disease, cervical     Depression     General anesthetics causing adverse effect in therapeutic use     NO DEMEROL    Hyperlipidemia     Hypertension     Internal derangement of right knee 2/13/2017    Joint pain 11/12/2014    Kidney stones     Knee pain 2/13/2017    Psoriasis 1983    Right knee pain 11/16/2015    Stroke     retinal stroke     Past Surgical History:   Procedure Laterality Date    APPENDECTOMY      EPIDURAL STEROID INJECTION N/A 7/13/2022    Procedure: Injection, Steroid, Epidural;  Surgeon: Shaka Caldwell MD;  Location: [facility] OR;  Service: Pain Management;  Laterality: N/A;  L3-4 (dr Goznalez)     EPIDURAL STEROID INJECTION INTO LUMBAR SPINE N/A 1/9/2025    Procedure: Injection-steroid-epidural-lumbar;  Surgeon: Shaka Caldwell MD;  Location: [facility] OR;  Service: Pain Management;  Laterality: N/A;    EPIDURAL STEROID INJECTION INTO LUMBAR SPINE N/A 6/12/2025    Procedure: Injection-steroid-epidural-lumbar;  Surgeon: Shaka Caldwell MD;  [facility];  Service: Pain Management;  Laterality: N/A;    HYSTERECTOMY      partial    JOINT REPLACEMENT Bilateral     Knees    KNEE ARTHROSCOPY Right 8/27/2015    Dr Roberts    lithtotrispy      LUMBAR SPINE SURGERY  10/2014    L4-L5 fusion    REVISION OF KNEE ARTHROPLASTY Left 4/5/2022    Procedure: REVISION, ARTHROPLASTY, KNEE;  Surgeon: Sri Roberts MD;  Location: [facility] OR;  Service: Orthopedics;  Laterality: Left;  general, regional w catheter, adductor, epidural, spinal, ester 50cc     Family History   Problem Relation Name Age of Onset    Thyroid disease Mother      Cancer Mother  55        lymphoma    Paget's disease of bone Mother      Psoriasis Father       Cancer Father  82        Bladder cancer    Diabetes Mellitus Brother      Heart disease Brother      Cancer Brother          prostate    Melanoma Brother      Bipolar disorder Daughter      Kidney disease Daughter          reflux    Psoriasis Maternal Grandmother      Diabetes Mellitus Brother      Heart disease Brother      Cancer Brother          prostate    Melanoma Brother      Diabetes Mellitus Brother      Heart disease Brother      Cancer Brother          prostate    Melanoma Brother      Lupus Neg Hx      Inflammatory bowel disease Neg Hx      Rheum arthritis Neg Hx       Social History     Socioeconomic History    Marital status:      Spouse name: Jose    Number of children: 3   Tobacco Use    Smoking status: Never    Smokeless tobacco: Never    Tobacco comments:     ; ; 3 children   Substance and Sexual Activity    Alcohol use: Yes     Comment: rarely    Drug use: No   Social History Narrative    Stairs-      Social Drivers of Health     Financial Resource Strain: Low Risk  (4/4/2024)    Overall Financial Resource Strain (CARDIA)     Difficulty of Paying Living Expenses: Not very hard   Food Insecurity: No Food Insecurity (4/4/2024)    Hunger Vital Sign     Worried About Running Out of Food in the Last Year: Never true     Ran Out of Food in the Last Year: Never true   Transportation Needs: No Transportation Needs (4/4/2024)    PRAPARE - Transportation     Lack of Transportation (Medical): No     Lack of Transportation (Non-Medical): No   Physical Activity: Unknown (4/4/2024)    Exercise Vital Sign     Days of Exercise per Week: 3 days   Stress: Stress Concern Present (4/4/2024)    [employer] [place] [employer] - Occupational Stress Questionnaire     Feeling of Stress : To some extent   Housing Stability: Low Risk  (4/4/2024)    Housing Stability Vital Sign     Unable to Pay for Housing in the Last Year: No     Number of Places Lived in the Last Year: 1     " Unstable Housing in the Last Year: No         Medications/Allergies: See med card    Vitals:    07/10/25 1024   BP: (!) 136/92   Pulse: 94   Weight: 79.4 kg (175 lb 0.7 oz)   Height: 5' 1" (1.549 m)   PainSc:   2   PainLoc: Back         Physical exam:    GENERAL: A and O x3, the patient appears well groomed and is in no acute distress.  Skin: No rashes or obvious lesions  HEENT: normocephalic, atraumatic  CARDIOVASCULAR:  RRR  LUNGS: non labored breathing  ABDOMEN: soft, nontender   UPPER EXTREMITIES: Normal alignment, normal range of motion, no atrophy, no skin changes,  hair growth and nail growth normal and equal bilaterally. No swelling, no tenderness.    LOWER EXTREMITIES:  Normal alignment, normal range of motion, no atrophy, no skin changes,  hair growth and nail growth normal and equal bilaterally. No swelling, no tenderness.  CERVICAL SPINE:  Cervical spine: ROM is limited in flexion, extension and lateral rotation with moderate increased pain.  Spurling's maneuver causes no neck pain to either side.  Myofascial exam: No Tenderness to palpation across cervical paraspinous region bilaterally.    LUMBAR SPINE  Lumbar spine: ROM is limited with flexion extension and oblique extension with moderate increased pain.    Dion's test causes no increased pain on either side.    Supine straight leg raise is negative bilaterally.  +femoral stretch  Internal and external rotation of the hip causes no increased pain on either side.  Myofascial exam: No tenderness to palpation across lumbar paraspinous muscles.      MENTAL STATUS: normal orientation, speech, language, and fund of knowledge for social situation.  Emotional state appropriate.    CRANIAL NERVES:  II:  PERRL bilaterally,   III,IV,VI: EOMI.    V:  Facial sensation equal bilaterally  VII:  Facial motor function normal.  VIII:  Hearing equal to finger rub bilaterally  IX/X: Gag normal, palate symmetric  XI:  Shoulder shrug equal, head turn equal  XII: "  Tongue midline without fasciculations      MOTOR: Tone and bulk: normal bilateral upper and lower Strength: normal   SENSATION: Light touch and pinprick intact bilaterally  REFLEXES: normal, symmetric, nonbrisk.  Toes down, no clonus. No hoffmans.  GAIT: normal rise, base, steps, and arm swing.        Imaging:  MRI C-spine 11/2024  C3-C4: Anterolisthesis with uncovering the disc.  Posterior disc osteophyte complex, asymmetric to the left.  Severe left and mild right facet arthropathy.  Marked left uncovertebral joint spurring.  Severe left neural foraminal stenosis.  Ligamentum flavum thickening.  Mild spinal canal stenosis.     C4-C5: Anterolisthesis with uncovering of the disc.  Posterior disc osteophyte complex.  Moderate right and mild left facet arthropathy.  Moderate right and mild left uncovertebral joint spurring.  Moderate right and mild left neural foraminal stenosis.  Ligamentum flavum thickening.  Mild spinal canal stenosis.     C5-C6: Anterolisthesis with uncovering the disc.  Posterior disc osteophyte complex.  Moderate left and mild right neural facet arthropathy.  Moderate bilateral uncovertebral joint spurring.  Moderate right and mild left neural foraminal stenosis.  Ligamentum flavum thickening.  Mild spinal canal stenosis.     C6-C7: Posterior disc osteophyte complex.  Mild bilateral facet arthropathy.  Moderate left and mild right uncovertebral joint spurring.  Mild-to-moderate left neural foraminal stenosis.  No significant spinal canal stenosis.     C7-T1: Mild posterior disc osteophyte complex.  Mild bilateral facet arthropathy.  Mild bilateral uncovertebral joint spurring.  Mild left greater than right neural foraminal stenosis.  No significant spinal canal stenosis.    MRI T-spine 11/2024  Impression:     1. Multilevel moderate to severe degenerative changes of the thoracic spine, as detailed above, most pronounced at T11-12 and contributing to moderate spinal canal and mild neural  foraminal stenosis.  2. Moderate neural foraminal and mild spinal canal stenosis at T1-2.  Mild neural foraminal and spinal canal stenosis elsewhere in the thoracic spine.      MRI L-spine 11/2024  Vertebral column: Postsurgical changes of posterior lumbar interbody fusion at L4-5 with associated susceptibility artifact.  Decompressive laminectomy at this level.  No acute fracture is identified; however, if trauma is suspected, a CT scan would be a more sensitive examination for fractures. No evidence of an aggressive marrow replacement process.  Severe intervertebral disc space narrowing asymmetrically on the right with vacuum disc phenomenon, and Modic type 2 degenerative endplate changes with Schmorl's nodes and marginal osteophyte formation.     Cord: Normal.  Conus terminates at L2.     Degenerative findings:     T12-L1: Mild circumferential disc bulge with small central annular fissure.  Mild bilateral facet arthropathy and ligamentum flavum thickening.  No significant neural foraminal or spinal canal stenosis.     L1-L2: Left foraminal/extraforaminal disc protrusion.  Mild bilateral facet arthropathy and ligamentum flavum thickening.  Mild left neural foraminal stenosis.  No spinal canal stenosis.     L2-L3: Mild circumferential disc bulge, asymmetric to the left.  Mild bilateral facet arthropathy.  No significant neural foraminal or spinal canal stenosis.     L3-L4: Severe right asymmetric disc space narrowing.  Retrolisthesis.  Circumferential disc bulge with osteophytic ridging, asymmetric to the right.  Moderate bilateral facet arthropathy.  Severe right and mild left neural foraminal stenosis.  Mild spinal canal stenosis.  Mild-to-moderate right lateral recess stenosis.     L4-L5: Postoperative changes, as above.   No significant neural foraminal or spinal canal stenosis.     L5-S1: Mild to moderate left asymmetric disc space narrowing.  Circumferential disc bulge, asymmetric to the left.  Severe  bilateral facet arthropathy with bilateral facet effusions.  Ligamentum flavum thickening.  Moderate left and mild right neural foraminal stenosis.  No significant spinal canal stenosis.    Assessment:  Carleen Kincaid is a 64 y.o. female with lower back pain.  1. Lumbar radiculopathy    2. Degeneration of intervertebral disc of lumbar region, unspecified whether pain present    3. Other spondylosis, lumbar region    4. DDD (degenerative disc disease), thoracic    5. DDD (degenerative disc disease), cervical    6. Greater trochanteric bursitis of left hip            Plan:  I have stressed the importance of physical activity and exercise to improve overall health  Reviewed pertinent imaging and records with patient  Monitor progress from repeat lumbar epidural steroid injection to the L3-4 level(s). I  May consider thoracic EVELIO in the future  Recommend PT. She defers for home exercises. GTB exercise demonstrated  F/u prn

## 2025-08-28 DIAGNOSIS — Z79.899 LONG-TERM USE OF HIGH-RISK MEDICATION: ICD-10-CM

## 2025-08-28 DIAGNOSIS — L40.50 PSORIATIC ARTHRITIS: ICD-10-CM

## 2025-08-28 DIAGNOSIS — L40.0 PSORIASIS VULGARIS: ICD-10-CM

## 2025-08-28 DIAGNOSIS — L40.0 PSORIASIS VULGARIS: Primary | ICD-10-CM

## 2025-08-28 RX ORDER — SECUKINUMAB 150 MG/ML
300 INJECTION SUBCUTANEOUS
Qty: 2 ML | Refills: 0 | Status: SHIPPED | OUTPATIENT
Start: 2025-08-28 | End: 2025-08-28 | Stop reason: SDUPTHER

## 2025-08-28 RX ORDER — SECUKINUMAB 150 MG/ML
300 INJECTION SUBCUTANEOUS
Qty: 2 ML | Refills: 3 | Status: ACTIVE | OUTPATIENT
Start: 2025-08-28 | End: 2025-08-28 | Stop reason: SDUPTHER

## 2025-08-28 RX ORDER — SECUKINUMAB 150 MG/ML
300 INJECTION SUBCUTANEOUS
Qty: 2 ML | Refills: 0 | Status: ACTIVE | OUTPATIENT
Start: 2025-08-28

## (undated) DEVICE — NDL TUOHY EPIDURAL 20G X 3.5

## (undated) DEVICE — BANDAGE ESMARK 6X12

## (undated) DEVICE — SEE MEDLINE ITEM 152530

## (undated) DEVICE — APPLICATOR CHLORAPREP ORN 26ML

## (undated) DEVICE — SOL IRR NACL .9% 3000ML

## (undated) DEVICE — DRAPE PLASTIC U 60X72

## (undated) DEVICE — BLADE RECIP DS OFST 70X1X12.5

## (undated) DEVICE — PIN THREADED STERILE
Type: IMPLANTABLE DEVICE | Site: KNEE | Status: NON-FUNCTIONAL
Removed: 2017-12-12

## (undated) DEVICE — PADDING WYTEX UNDRCST 6INX4YD

## (undated) DEVICE — DRESSING TELFA N ADH 3X8

## (undated) DEVICE — SPONGE LAP 18X18 PREWASHED

## (undated) DEVICE — NDL HYPODERMIC BLUNT 18G 1.5IN

## (undated) DEVICE — GLOVE BIOGEL SKINSENSE PI 8.0

## (undated) DEVICE — GLOVE BIOGEL SKINSENSE PI 7.0

## (undated) DEVICE — SYR ONLY LUER LOCK 20CC

## (undated) DEVICE — CHLORAPREP 10.5 ML APPLICATOR

## (undated) DEVICE — SOL NACL 0.9% INJ PF/100 150

## (undated) DEVICE — GLOVE BIOGEL SKINSENSE PI 8.5

## (undated) DEVICE — BLADE SAG DUAL 18MMX1.27MMX90M

## (undated) DEVICE — GAUZE SPONGE 4X4 12PLY

## (undated) DEVICE — SYS PRINEO SKIN CLOSURE

## (undated) DEVICE — GLOVE BIOGEL SKINSENSE PI 7.5

## (undated) DEVICE — Device

## (undated) DEVICE — BLANKET HYPOTHERMIA 25X64IN

## (undated) DEVICE — KIT EVACUATOR 3-SPRING 1/8 DRN

## (undated) DEVICE — UNDERGLOVES BIOGEL PI SZ 7 LF

## (undated) DEVICE — SUT MONOCRYL 3-0 PS-2 UND

## (undated) DEVICE — NDL 18GA X1 1/2 REG BEVEL

## (undated) DEVICE — ELECTRODE REM PLYHSV RETURN 9

## (undated) DEVICE — PIN TEMPORARY
Type: IMPLANTABLE DEVICE | Site: KNEE | Status: NON-FUNCTIONAL
Removed: 2017-12-12

## (undated) DEVICE — NDL 22GA X1 1/2 REG BEVEL

## (undated) DEVICE — SUT STRATAFIX 1 PDS CT-1

## (undated) DEVICE — GLOVE SENSICARE PI GRN 7.5

## (undated) DEVICE — SUT VICRYL PLUS 3-0 SH 18IN

## (undated) DEVICE — SUT VICRYL+ 1 CT1 18IN

## (undated) DEVICE — PAD ELECTRODE STER 1.5X3

## (undated) DEVICE — DRAPE STERI U-SHAPED 47X51IN

## (undated) DEVICE — YANKAUER OPEN TIP W/O VENT

## (undated) DEVICE — GOWN B1 X-LG X-LONG

## (undated) DEVICE — DRAPE STERI INSTRUMENT 1018

## (undated) DEVICE — SYS LABEL CORRECT MED

## (undated) DEVICE — BLADE SAGITTAL 1/2 W-3 L

## (undated) DEVICE — PAD COLD THERAPY KNEE WRAP ON

## (undated) DEVICE — UNDERGLOVE BIOGEL PI SZ 6.5 LF

## (undated) DEVICE — KIT IRR SUCTION HND PIECE

## (undated) DEVICE — GLOVE SURG ULTRA TOUCH 7.5

## (undated) DEVICE — SYR GLASS 5CC LUER LOK

## (undated) DEVICE — HOOD T-5 TEAR AWAY STERILE

## (undated) DEVICE — SYR 30CC LUER LOCK

## (undated) DEVICE — SEE MEDLINE ITEM 153151

## (undated) DEVICE — PAD ABD 8X10 STERILE

## (undated) DEVICE — SEE MEDLINE ITEM 146298

## (undated) DEVICE — PAD CAST SPECIALIST STRL 6

## (undated) DEVICE — SEE MEDLINE ITEM 157131

## (undated) DEVICE — SUT MCRYL PLUS 4-0 PS2 27IN

## (undated) DEVICE — BLADE RECP OFFSET 77.5X11X1.23

## (undated) DEVICE — MIXER BONE CEMENT

## (undated) DEVICE — UNDERGLOVES BIOGEL PI SIZE 7.5

## (undated) DEVICE — GLOVE BIOGEL SKINSENSE PI 6.5

## (undated) DEVICE — CONTAINER SPECIMEN STRL 4OZ

## (undated) DEVICE — TOURNIQUET SB QC SP 34X4IN

## (undated) DEVICE — SEE MEDLINE ITEM 157150

## (undated) DEVICE — SYR SLIP TIP 1CC

## (undated) DEVICE — UNDERGLOVES BIOGEL PI SIZE 8.5

## (undated) DEVICE — SEE MEDLINE ITEM 152523

## (undated) DEVICE — SOL 9P NACL IRR PIC IL

## (undated) DEVICE — SEE MEDLINE ITEM 154981

## (undated) DEVICE — INTERPULSE SET

## (undated) DEVICE — TAPE SILK 3IN

## (undated) DEVICE — SYS KNEE EPAK PIN ATTUNE
Type: IMPLANTABLE DEVICE | Site: KNEE | Status: NON-FUNCTIONAL
Removed: 2022-04-05

## (undated) DEVICE — BLADE SURG #15 CARBON STEEL

## (undated) DEVICE — GOWN SMARTGOWN LVL4 X-LONG XL

## (undated) DEVICE — ADHESIVE DERMABOND ADVANCED

## (undated) DEVICE — NDL SAFETY 25G X 1.5 ECLIPSE

## (undated) DEVICE — TUBING MINIBORE EXTENSION

## (undated) DEVICE — DRESSING AQUACEL AG ADV 3.5X12

## (undated) DEVICE — SYR DISP LL 5CC

## (undated) DEVICE — COVER BACK TABLE 72X21

## (undated) DEVICE — NDL HYPO SAFETY 22 X 1 1/2

## (undated) DEVICE — BNDG COFLEX FOAM LF2 ST 6X5YD

## (undated) DEVICE — SYRINGE 0.9% NACL 10MIL PREFIL

## (undated) DEVICE — KIT TOTAL KNEE TKOFG

## (undated) DEVICE — UNDERGLOVES BIOGEL PI SIZE 8

## (undated) DEVICE — TOURNIQUET SB QC DP 34X4IN

## (undated) DEVICE — BLADE SAG 18.0X1.27X100

## (undated) DEVICE — PENCIL ROCKER SWITCH 10FT CORD

## (undated) DEVICE — MASK FLYTE HOOD PEEL AWAY